# Patient Record
Sex: FEMALE | URBAN - METROPOLITAN AREA
[De-identification: names, ages, dates, MRNs, and addresses within clinical notes are randomized per-mention and may not be internally consistent; named-entity substitution may affect disease eponyms.]

---

## 2019-05-01 ENCOUNTER — HOSPITAL ENCOUNTER (OUTPATIENT)
Age: 64
Setting detail: SPECIMEN
Discharge: HOME | End: 2019-05-01
Payer: COMMERCIAL

## 2019-05-01 LAB
ANION GAP SERPL CALCULATED.3IONS-SCNC: 10 MMOL/L (ref 9–17)
BUN BLDV-MCNC: 9 MG/DL (ref 8–23)
BUN/CREAT BLD: ABNORMAL (ref 9–20)
CALCIUM SERPL-MCNC: 9.7 MG/DL (ref 8.6–10.4)
CHLORIDE BLD-SCNC: 91 MMOL/L (ref 98–107)
CO2: 28 MMOL/L (ref 20–31)
CREAT SERPL-MCNC: 0.58 MG/DL (ref 0.5–0.9)
GFR AFRICAN AMERICAN: >60 ML/MIN
GFR NON-AFRICAN AMERICAN: >60 ML/MIN
GFR SERPL CREATININE-BSD FRML MDRD: ABNORMAL ML/MIN/{1.73_M2}
GFR SERPL CREATININE-BSD FRML MDRD: ABNORMAL ML/MIN/{1.73_M2}
GLUCOSE BLD-MCNC: 134 MG/DL (ref 70–99)
HCT VFR BLD CALC: 33.1 % (ref 36.3–47.1)
HEMOGLOBIN: 10.1 G/DL (ref 11.9–15.1)
INR BLD: 2.3
MCH RBC QN AUTO: 27.7 PG (ref 25.2–33.5)
MCHC RBC AUTO-ENTMCNC: 30.5 G/DL (ref 28.4–34.8)
MCV RBC AUTO: 90.7 FL (ref 82.6–102.9)
NRBC AUTOMATED: 0 PER 100 WBC
PDW BLD-RTO: 17.3 % (ref 11.8–14.4)
PLATELET # BLD: 432 K/UL (ref 138–453)
PMV BLD AUTO: 10.2 FL (ref 8.1–13.5)
POTASSIUM SERPL-SCNC: 4.8 MMOL/L (ref 3.7–5.3)
PROTHROMBIN TIME: 22.5 SEC (ref 9.7–11.6)
RBC # BLD: 3.65 M/UL (ref 3.95–5.11)
SODIUM BLD-SCNC: 129 MMOL/L (ref 135–144)
WBC # BLD: 9.5 K/UL (ref 3.5–11.3)

## 2019-05-01 PROCEDURE — 80048 BASIC METABOLIC PNL TOTAL CA: CPT

## 2019-05-01 PROCEDURE — 36415 COLL VENOUS BLD VENIPUNCTURE: CPT

## 2019-05-01 PROCEDURE — 85027 COMPLETE CBC AUTOMATED: CPT

## 2019-05-01 PROCEDURE — 85610 PROTHROMBIN TIME: CPT

## 2019-05-01 PROCEDURE — P9603 ONE-WAY ALLOW PRORATED MILES: HCPCS

## 2019-05-04 ENCOUNTER — HOSPITAL ENCOUNTER (OUTPATIENT)
Age: 64
Setting detail: SPECIMEN
Discharge: HOME | End: 2019-05-04
Payer: COMMERCIAL

## 2019-05-04 LAB
ANION GAP SERPL CALCULATED.3IONS-SCNC: 11 MMOL/L (ref 9–17)
ANION GAP SERPL CALCULATED.3IONS-SCNC: 8 MMOL/L (ref 9–17)
BUN BLDV-MCNC: 23 MG/DL (ref 8–23)
BUN BLDV-MCNC: 24 MG/DL (ref 8–23)
BUN/CREAT BLD: 24 (ref 9–20)
BUN/CREAT BLD: 27 (ref 9–20)
CALCIUM SERPL-MCNC: 9.2 MG/DL (ref 8.6–10.4)
CALCIUM SERPL-MCNC: 9.3 MG/DL (ref 8.6–10.4)
CHLORIDE BLD-SCNC: 83 MMOL/L (ref 98–107)
CHLORIDE BLD-SCNC: 84 MMOL/L (ref 98–107)
CO2: 28 MMOL/L (ref 20–31)
CO2: 29 MMOL/L (ref 20–31)
CREAT SERPL-MCNC: 0.84 MG/DL (ref 0.5–0.9)
CREAT SERPL-MCNC: 1 MG/DL (ref 0.5–0.9)
GFR AFRICAN AMERICAN: >60 ML/MIN
GFR AFRICAN AMERICAN: >60 ML/MIN
GFR NON-AFRICAN AMERICAN: 55 ML/MIN
GFR NON-AFRICAN AMERICAN: >60 ML/MIN
GFR SERPL CREATININE-BSD FRML MDRD: ABNORMAL ML/MIN/{1.73_M2}
GLUCOSE BLD-MCNC: 174 MG/DL (ref 70–99)
GLUCOSE BLD-MCNC: 215 MG/DL (ref 70–99)
HCT VFR BLD CALC: 31.7 % (ref 36.3–47.1)
HEMOGLOBIN: 9.3 G/DL (ref 11.9–15.1)
INR BLD: 7.1
MCH RBC QN AUTO: 26.8 PG (ref 25.2–33.5)
MCHC RBC AUTO-ENTMCNC: 29.3 G/DL (ref 28.4–34.8)
MCV RBC AUTO: 91.4 FL (ref 82.6–102.9)
NRBC AUTOMATED: ABNORMAL PER 100 WBC
PDW BLD-RTO: 17.1 % (ref 11.8–14.4)
PLATELET # BLD: 382 K/UL (ref 138–453)
PMV BLD AUTO: 9.7 FL (ref 8.1–13.5)
POTASSIUM SERPL-SCNC: 4.9 MMOL/L (ref 3.7–5.3)
POTASSIUM SERPL-SCNC: 5.4 MMOL/L (ref 3.7–5.3)
PROTHROMBIN TIME: 67.9 SEC (ref 9.7–11.6)
RBC # BLD: 3.47 M/UL (ref 3.95–5.11)
SODIUM BLD-SCNC: 120 MMOL/L (ref 135–144)
SODIUM BLD-SCNC: 123 MMOL/L (ref 135–144)
WBC # BLD: 13.5 K/UL (ref 3.5–11.3)

## 2019-05-04 PROCEDURE — 80048 BASIC METABOLIC PNL TOTAL CA: CPT

## 2019-05-04 PROCEDURE — P9603 ONE-WAY ALLOW PRORATED MILES: HCPCS

## 2019-05-04 PROCEDURE — 85027 COMPLETE CBC AUTOMATED: CPT

## 2019-05-04 PROCEDURE — 85610 PROTHROMBIN TIME: CPT

## 2019-05-04 PROCEDURE — 36415 COLL VENOUS BLD VENIPUNCTURE: CPT

## 2019-05-05 ENCOUNTER — HOSPITAL ENCOUNTER (OUTPATIENT)
Age: 64
Setting detail: SPECIMEN
Discharge: HOME | End: 2019-05-05
Payer: COMMERCIAL

## 2019-05-05 LAB
INR BLD: 7.4
PROTHROMBIN TIME: 70.3 SEC (ref 9.7–11.6)

## 2019-05-05 PROCEDURE — 36415 COLL VENOUS BLD VENIPUNCTURE: CPT

## 2019-05-05 PROCEDURE — 85610 PROTHROMBIN TIME: CPT

## 2019-05-05 PROCEDURE — P9603 ONE-WAY ALLOW PRORATED MILES: HCPCS

## 2023-07-05 ENCOUNTER — HOSPITAL ENCOUNTER (INPATIENT)
Age: 68
LOS: 6 days | Discharge: HOME | DRG: 291 | End: 2023-07-11
Attending: EMERGENCY MEDICINE | Admitting: STUDENT IN AN ORGANIZED HEALTH CARE EDUCATION/TRAINING PROGRAM
Payer: MEDICARE

## 2023-07-05 ENCOUNTER — APPOINTMENT (OUTPATIENT)
Dept: GENERAL RADIOLOGY | Age: 68
DRG: 291 | End: 2023-07-05
Payer: MEDICARE

## 2023-07-05 DIAGNOSIS — N17.9 AKI (ACUTE KIDNEY INJURY) (HCC): ICD-10-CM

## 2023-07-05 DIAGNOSIS — R25.1 TREMOR: ICD-10-CM

## 2023-07-05 DIAGNOSIS — J96.01 ACUTE RESPIRATORY FAILURE WITH HYPOXIA AND HYPERCAPNIA (HCC): ICD-10-CM

## 2023-07-05 DIAGNOSIS — J96.02 ACUTE RESPIRATORY FAILURE WITH HYPOXIA AND HYPERCAPNIA (HCC): ICD-10-CM

## 2023-07-05 DIAGNOSIS — N18.9 CHRONIC KIDNEY DISEASE, UNSPECIFIED CKD STAGE: ICD-10-CM

## 2023-07-05 DIAGNOSIS — J81.0 ACUTE PULMONARY EDEMA (HCC): Primary | ICD-10-CM

## 2023-07-05 DIAGNOSIS — N17.0 ACUTE RENAL FAILURE WITH TUBULAR NECROSIS (HCC): ICD-10-CM

## 2023-07-05 PROBLEM — I50.9 DECOMPENSATED HEART FAILURE (HCC): Status: ACTIVE | Noted: 2023-07-05

## 2023-07-05 LAB
ACTION: NORMAL
ACTION: NORMAL
ANION GAP SERPL CALCULATED.3IONS-SCNC: 8 MMOL/L (ref 9–17)
BASOPHILS # BLD: 0 K/UL (ref 0–0.2)
BASOPHILS NFR BLD: 0 % (ref 0–2)
BNP SERPL-MCNC: 1805 PG/ML
BUN SERPL-MCNC: 28 MG/DL (ref 8–23)
CALCIUM SERPL-MCNC: 9.4 MG/DL (ref 8.6–10.4)
CHLORIDE SERPL-SCNC: 93 MMOL/L (ref 98–107)
CO2 SERPL-SCNC: 33 MMOL/L (ref 20–31)
CREAT SERPL-MCNC: 1.36 MG/DL (ref 0.5–0.9)
DATE AND TIME: NORMAL
DATE AND TIME: NORMAL
EOSINOPHIL # BLD: 0.1 K/UL (ref 0–0.4)
EOSINOPHILS RELATIVE PERCENT: 1 % (ref 1–4)
ERYTHROCYTE [DISTWIDTH] IN BLOOD BY AUTOMATED COUNT: 13.8 % (ref 12.5–15.4)
FIO2: 32
FIO2: 35
GFR SERPL CREATININE-BSD FRML MDRD: 42 ML/MIN/1.73M2
GLUCOSE BLD-MCNC: 104 MG/DL (ref 65–105)
GLUCOSE SERPL-MCNC: 117 MG/DL (ref 70–99)
HCO3 VENOUS: 34.2 MMOL/L (ref 22–29)
HCT VFR BLD AUTO: 28.7 % (ref 36–46)
HGB BLD-MCNC: 9.3 G/DL (ref 12–16)
INR PPP: 3.5
LYMPHOCYTES # BLD: 14 % (ref 24–44)
LYMPHOCYTES NFR BLD: 1 K/UL (ref 1–4.8)
MCH RBC QN AUTO: 32.6 PG (ref 26–34)
MCHC RBC AUTO-ENTMCNC: 32.3 G/DL (ref 31–37)
MCV RBC AUTO: 100.8 FL (ref 80–100)
MODE: ABNORMAL
MONOCYTES NFR BLD: 0.4 K/UL (ref 0.1–1.2)
MONOCYTES NFR BLD: 6 % (ref 2–11)
NEUTROPHILS NFR BLD: 79 % (ref 36–66)
NEUTS SEG NFR BLD: 5.6 K/UL (ref 1.8–7.7)
NOTIFY: NORMAL
NOTIFY: NORMAL
O2 DEVICE/FLOW/%: ABNORMAL
O2 SAT, VEN: 50 % (ref 60–85)
PCO2, VEN: 71.6 MM HG (ref 41–51)
PH VENOUS: 7.29 (ref 7.32–7.43)
PLATELET # BLD AUTO: 313 K/UL (ref 140–450)
PMV BLD AUTO: 7.5 FL (ref 6–12)
PO2, VEN: 31.2 MM HG (ref 30–50)
POC HCO3: 35.1 MMOL/L (ref 21–28)
POC O2 SATURATION: 90 % (ref 94–98)
POC PCO2: 76.4 MM HG (ref 35–48)
POC PH: 7.27 (ref 7.35–7.45)
POC PO2: 70.1 MM HG (ref 83–108)
POSITIVE BASE EXCESS, ART: 5 (ref 0–3)
POSITIVE BASE EXCESS, VEN: 6 (ref 0–3)
POTASSIUM SERPL-SCNC: 5.2 MMOL/L (ref 3.7–5.3)
PROTHROMBIN TIME: 35.5 SEC (ref 9.4–12.6)
RBC # BLD AUTO: 2.85 M/UL (ref 4–5.2)
READ BACK: YES
READ BACK: YES
SAMPLE SITE: ABNORMAL
SODIUM SERPL-SCNC: 134 MMOL/L (ref 135–144)
TROPONIN I SERPL HS-MCNC: 47 NG/L (ref 0–14)
TROPONIN I SERPL HS-MCNC: 50 NG/L (ref 0–14)
WBC OTHER # BLD: 7.1 K/UL (ref 3.5–11)

## 2023-07-05 PROCEDURE — 84484 ASSAY OF TROPONIN QUANT: CPT

## 2023-07-05 PROCEDURE — 6370000000 HC RX 637 (ALT 250 FOR IP): Performed by: NURSE PRACTITIONER

## 2023-07-05 PROCEDURE — 93005 ELECTROCARDIOGRAM TRACING: CPT | Performed by: NURSE PRACTITIONER

## 2023-07-05 PROCEDURE — 82803 BLOOD GASES ANY COMBINATION: CPT

## 2023-07-05 PROCEDURE — 85027 COMPLETE CBC AUTOMATED: CPT

## 2023-07-05 PROCEDURE — 82947 ASSAY GLUCOSE BLOOD QUANT: CPT

## 2023-07-05 PROCEDURE — 85610 PROTHROMBIN TIME: CPT

## 2023-07-05 PROCEDURE — 2060000000 HC ICU INTERMEDIATE R&B

## 2023-07-05 PROCEDURE — 96374 THER/PROPH/DIAG INJ IV PUSH: CPT

## 2023-07-05 PROCEDURE — 94640 AIRWAY INHALATION TREATMENT: CPT

## 2023-07-05 PROCEDURE — 6360000002 HC RX W HCPCS: Performed by: NURSE PRACTITIONER

## 2023-07-05 PROCEDURE — 36415 COLL VENOUS BLD VENIPUNCTURE: CPT

## 2023-07-05 PROCEDURE — 83036 HEMOGLOBIN GLYCOSYLATED A1C: CPT

## 2023-07-05 PROCEDURE — 80048 BASIC METABOLIC PNL TOTAL CA: CPT

## 2023-07-05 PROCEDURE — 99285 EMERGENCY DEPT VISIT HI MDM: CPT

## 2023-07-05 PROCEDURE — 71045 X-RAY EXAM CHEST 1 VIEW: CPT

## 2023-07-05 PROCEDURE — 6370000000 HC RX 637 (ALT 250 FOR IP): Performed by: FAMILY MEDICINE

## 2023-07-05 PROCEDURE — 83880 ASSAY OF NATRIURETIC PEPTIDE: CPT

## 2023-07-05 PROCEDURE — 2580000003 HC RX 258: Performed by: NURSE PRACTITIONER

## 2023-07-05 PROCEDURE — 94660 CPAP INITIATION&MGMT: CPT

## 2023-07-05 PROCEDURE — 36600 WITHDRAWAL OF ARTERIAL BLOOD: CPT

## 2023-07-05 RX ORDER — SODIUM CHLORIDE 0.9 % (FLUSH) 0.9 %
10 SYRINGE (ML) INJECTION PRN
Status: DISCONTINUED | OUTPATIENT
Start: 2023-07-05 | End: 2023-07-11 | Stop reason: HOSPADM

## 2023-07-05 RX ORDER — DEXTROSE MONOHYDRATE 100 MG/ML
INJECTION, SOLUTION INTRAVENOUS CONTINUOUS PRN
Status: DISCONTINUED | OUTPATIENT
Start: 2023-07-05 | End: 2023-07-11 | Stop reason: HOSPADM

## 2023-07-05 RX ORDER — POTASSIUM CHLORIDE 750 MG/1
10 CAPSULE, EXTENDED RELEASE ORAL DAILY
Status: DISCONTINUED | OUTPATIENT
Start: 2023-07-06 | End: 2023-07-11 | Stop reason: HOSPADM

## 2023-07-05 RX ORDER — ASPIRIN 81 MG/1
81 TABLET, CHEWABLE ORAL DAILY
Status: DISCONTINUED | OUTPATIENT
Start: 2023-07-06 | End: 2023-07-11 | Stop reason: HOSPADM

## 2023-07-05 RX ORDER — SODIUM CHLORIDE 9 MG/ML
INJECTION, SOLUTION INTRAVENOUS PRN
Status: DISCONTINUED | OUTPATIENT
Start: 2023-07-05 | End: 2023-07-11 | Stop reason: HOSPADM

## 2023-07-05 RX ORDER — ATORVASTATIN CALCIUM 40 MG/1
40 TABLET, FILM COATED ORAL DAILY
Status: DISCONTINUED | OUTPATIENT
Start: 2023-07-06 | End: 2023-07-11 | Stop reason: HOSPADM

## 2023-07-05 RX ORDER — SENNA AND DOCUSATE SODIUM 50; 8.6 MG/1; MG/1
1 TABLET, FILM COATED ORAL DAILY
Status: DISCONTINUED | OUTPATIENT
Start: 2023-07-06 | End: 2023-07-11 | Stop reason: HOSPADM

## 2023-07-05 RX ORDER — ENOXAPARIN SODIUM 100 MG/ML
40 INJECTION SUBCUTANEOUS DAILY
Status: DISCONTINUED | OUTPATIENT
Start: 2023-07-06 | End: 2023-07-05

## 2023-07-05 RX ORDER — GLUCAGON 1 MG/ML
1 KIT INJECTION PRN
Status: DISCONTINUED | OUTPATIENT
Start: 2023-07-05 | End: 2023-07-11 | Stop reason: HOSPADM

## 2023-07-05 RX ORDER — VITAMIN B COMPLEX
1000 TABLET ORAL DAILY
Status: DISCONTINUED | OUTPATIENT
Start: 2023-07-06 | End: 2023-07-11 | Stop reason: HOSPADM

## 2023-07-05 RX ORDER — INSULIN LISPRO 100 [IU]/ML
0-4 INJECTION, SOLUTION INTRAVENOUS; SUBCUTANEOUS NIGHTLY
Status: DISCONTINUED | OUTPATIENT
Start: 2023-07-05 | End: 2023-07-11 | Stop reason: HOSPADM

## 2023-07-05 RX ORDER — POTASSIUM CHLORIDE 7.45 MG/ML
10 INJECTION INTRAVENOUS PRN
Status: DISCONTINUED | OUTPATIENT
Start: 2023-07-05 | End: 2023-07-11 | Stop reason: HOSPADM

## 2023-07-05 RX ORDER — ONDANSETRON 4 MG/1
4 TABLET, ORALLY DISINTEGRATING ORAL EVERY 8 HOURS PRN
Status: DISCONTINUED | OUTPATIENT
Start: 2023-07-05 | End: 2023-07-11 | Stop reason: HOSPADM

## 2023-07-05 RX ORDER — FUROSEMIDE 10 MG/ML
60 INJECTION INTRAMUSCULAR; INTRAVENOUS ONCE
Status: COMPLETED | OUTPATIENT
Start: 2023-07-05 | End: 2023-07-05

## 2023-07-05 RX ORDER — ONDANSETRON 2 MG/ML
4 INJECTION INTRAMUSCULAR; INTRAVENOUS EVERY 6 HOURS PRN
Status: DISCONTINUED | OUTPATIENT
Start: 2023-07-05 | End: 2023-07-11 | Stop reason: HOSPADM

## 2023-07-05 RX ORDER — GABAPENTIN 300 MG/1
300 CAPSULE ORAL 3 TIMES DAILY
Status: DISCONTINUED | OUTPATIENT
Start: 2023-07-05 | End: 2023-07-11 | Stop reason: HOSPADM

## 2023-07-05 RX ORDER — ACETAMINOPHEN 325 MG/1
650 TABLET ORAL EVERY 6 HOURS PRN
Status: DISCONTINUED | OUTPATIENT
Start: 2023-07-05 | End: 2023-07-11 | Stop reason: HOSPADM

## 2023-07-05 RX ORDER — IPRATROPIUM BROMIDE AND ALBUTEROL SULFATE 2.5; .5 MG/3ML; MG/3ML
1 SOLUTION RESPIRATORY (INHALATION) 2 TIMES DAILY
Status: DISCONTINUED | OUTPATIENT
Start: 2023-07-05 | End: 2023-07-11 | Stop reason: HOSPADM

## 2023-07-05 RX ORDER — POLYETHYLENE GLYCOL 3350 17 G/17G
17 POWDER, FOR SOLUTION ORAL DAILY PRN
Status: DISCONTINUED | OUTPATIENT
Start: 2023-07-05 | End: 2023-07-11 | Stop reason: HOSPADM

## 2023-07-05 RX ORDER — FUROSEMIDE 10 MG/ML
40 INJECTION INTRAMUSCULAR; INTRAVENOUS 2 TIMES DAILY
Status: DISCONTINUED | OUTPATIENT
Start: 2023-07-05 | End: 2023-07-08

## 2023-07-05 RX ORDER — SODIUM CHLORIDE 1 G/1
2 TABLET ORAL 2 TIMES DAILY
Status: DISCONTINUED | OUTPATIENT
Start: 2023-07-05 | End: 2023-07-07

## 2023-07-05 RX ORDER — ALBUTEROL SULFATE 2.5 MG/3ML
2.5 SOLUTION RESPIRATORY (INHALATION) EVERY 4 HOURS PRN
Status: DISCONTINUED | OUTPATIENT
Start: 2023-07-05 | End: 2023-07-11 | Stop reason: HOSPADM

## 2023-07-05 RX ORDER — POTASSIUM CHLORIDE 20 MEQ/1
40 TABLET, EXTENDED RELEASE ORAL PRN
Status: DISCONTINUED | OUTPATIENT
Start: 2023-07-05 | End: 2023-07-11 | Stop reason: HOSPADM

## 2023-07-05 RX ORDER — BISACODYL 10 MG
10 SUPPOSITORY, RECTAL RECTAL DAILY
Status: DISCONTINUED | OUTPATIENT
Start: 2023-07-06 | End: 2023-07-11 | Stop reason: HOSPADM

## 2023-07-05 RX ORDER — INSULIN LISPRO 100 [IU]/ML
0-4 INJECTION, SOLUTION INTRAVENOUS; SUBCUTANEOUS
Status: DISCONTINUED | OUTPATIENT
Start: 2023-07-06 | End: 2023-07-11 | Stop reason: HOSPADM

## 2023-07-05 RX ORDER — MAGNESIUM SULFATE IN WATER 40 MG/ML
2000 INJECTION, SOLUTION INTRAVENOUS PRN
Status: DISCONTINUED | OUTPATIENT
Start: 2023-07-05 | End: 2023-07-11 | Stop reason: HOSPADM

## 2023-07-05 RX ORDER — SODIUM CHLORIDE 0.9 % (FLUSH) 0.9 %
5-40 SYRINGE (ML) INJECTION EVERY 12 HOURS SCHEDULED
Status: DISCONTINUED | OUTPATIENT
Start: 2023-07-05 | End: 2023-07-11 | Stop reason: HOSPADM

## 2023-07-05 RX ORDER — METOPROLOL SUCCINATE 25 MG/1
25 TABLET, EXTENDED RELEASE ORAL DAILY
Status: DISCONTINUED | OUTPATIENT
Start: 2023-07-06 | End: 2023-07-11 | Stop reason: HOSPADM

## 2023-07-05 RX ORDER — ACETAMINOPHEN 650 MG/1
650 SUPPOSITORY RECTAL EVERY 6 HOURS PRN
Status: DISCONTINUED | OUTPATIENT
Start: 2023-07-05 | End: 2023-07-11 | Stop reason: HOSPADM

## 2023-07-05 RX ADMIN — IPRATROPIUM BROMIDE AND ALBUTEROL SULFATE 1 DOSE: .5; 2.5 SOLUTION RESPIRATORY (INHALATION) at 22:58

## 2023-07-05 RX ADMIN — SODIUM CHLORIDE, PRESERVATIVE FREE 10 ML: 5 INJECTION INTRAVENOUS at 23:06

## 2023-07-05 RX ADMIN — SODIUM CHLORIDE 2 G: 1 TABLET ORAL at 23:06

## 2023-07-05 RX ADMIN — FUROSEMIDE 60 MG: 10 INJECTION, SOLUTION INTRAMUSCULAR; INTRAVENOUS at 19:49

## 2023-07-05 RX ADMIN — GABAPENTIN 300 MG: 300 CAPSULE ORAL at 23:06

## 2023-07-05 RX ADMIN — FUROSEMIDE 40 MG: 10 INJECTION, SOLUTION INTRAMUSCULAR; INTRAVENOUS at 23:06

## 2023-07-05 ASSESSMENT — PAIN - FUNCTIONAL ASSESSMENT: PAIN_FUNCTIONAL_ASSESSMENT: NONE - DENIES PAIN

## 2023-07-05 ASSESSMENT — LIFESTYLE VARIABLES
HOW OFTEN DO YOU HAVE A DRINK CONTAINING ALCOHOL: NEVER
HOW MANY STANDARD DRINKS CONTAINING ALCOHOL DO YOU HAVE ON A TYPICAL DAY: PATIENT DOES NOT DRINK

## 2023-07-06 ENCOUNTER — APPOINTMENT (OUTPATIENT)
Dept: NON INVASIVE DIAGNOSTICS | Age: 68
DRG: 291 | End: 2023-07-06
Payer: MEDICARE

## 2023-07-06 PROBLEM — J81.0 ACUTE PULMONARY EDEMA (HCC): Status: ACTIVE | Noted: 2023-07-06

## 2023-07-06 LAB
ALLEN TEST: POSITIVE
ANION GAP SERPL CALCULATED.3IONS-SCNC: 12 MMOL/L (ref 9–17)
BACTERIA URNS QL MICRO: ABNORMAL
BILIRUB UR QL STRIP: NEGATIVE
BNP SERPL-MCNC: 1713 PG/ML
BUN SERPL-MCNC: 28 MG/DL (ref 8–23)
CALCIUM SERPL-MCNC: 9.8 MG/DL (ref 8.6–10.4)
CHARACTER UR: ABNORMAL
CHLORIDE SERPL-SCNC: 92 MMOL/L (ref 98–107)
CHOLEST SERPL-MCNC: 131 MG/DL
CHOLESTEROL/HDL RATIO: 3.3
CLARITY UR: ABNORMAL
CO2 SERPL-SCNC: 32 MMOL/L (ref 20–31)
COLOR UR: YELLOW
CREAT SERPL-MCNC: 1.34 MG/DL (ref 0.5–0.9)
EPI CELLS #/AREA URNS HPF: ABNORMAL /HPF (ref 0–5)
EST. AVERAGE GLUCOSE BLD GHB EST-MCNC: 131 MG/DL
FIO2: 35
GFR SERPL CREATININE-BSD FRML MDRD: 43 ML/MIN/1.73M2
GLUCOSE BLD-MCNC: 156 MG/DL (ref 65–105)
GLUCOSE BLD-MCNC: 160 MG/DL (ref 65–105)
GLUCOSE BLD-MCNC: 172 MG/DL (ref 65–105)
GLUCOSE BLD-MCNC: 188 MG/DL (ref 65–105)
GLUCOSE SERPL-MCNC: 100 MG/DL (ref 70–99)
GLUCOSE UR STRIP-MCNC: NEGATIVE MG/DL
HBA1C MFR BLD: 6.2 % (ref 4–6)
HDLC SERPL-MCNC: 40 MG/DL
HGB UR QL STRIP.AUTO: ABNORMAL
IRON SATN MFR SERPL: 23 % (ref 20–55)
IRON SERPL-MCNC: 43 UG/DL (ref 37–145)
KETONES UR STRIP-MCNC: NEGATIVE MG/DL
LDLC SERPL CALC-MCNC: 70 MG/DL (ref 0–130)
LEUKOCYTE ESTERASE UR QL STRIP: ABNORMAL
LV EF: 55 %
LVEF MODALITY: NORMAL
MAGNESIUM SERPL-MCNC: 2.5 MG/DL (ref 1.6–2.6)
MODE: ABNORMAL
MYOGLOBIN SERPL-MCNC: 90 NG/ML (ref 25–58)
MYOGLOBIN SERPL-MCNC: 93 NG/ML (ref 25–58)
NITRITE UR QL STRIP: NEGATIVE
O2 DEVICE/FLOW/%: ABNORMAL
PH UR STRIP: 6 [PH] (ref 5–8)
POC HCO3: 35.2 MMOL/L (ref 21–28)
POC O2 SATURATION: 98 % (ref 94–98)
POC PCO2: 61.4 MM HG (ref 35–48)
POC PH: 7.37 (ref 7.35–7.45)
POC PO2: 116.4 MM HG (ref 83–108)
POSITIVE BASE EXCESS, ART: 8 (ref 0–3)
POTASSIUM SERPL-SCNC: 5.1 MMOL/L (ref 3.7–5.3)
PROT UR STRIP-MCNC: NEGATIVE MG/DL
RBC #/AREA URNS HPF: ABNORMAL /HPF (ref 0–2)
SAMPLE SITE: ABNORMAL
SODIUM SERPL-SCNC: 136 MMOL/L (ref 135–144)
SP GR UR STRIP: 1.01 (ref 1–1.03)
TIBC SERPL-MCNC: 185 UG/DL (ref 250–450)
TRIGL SERPL-MCNC: 104 MG/DL
TROPONIN I SERPL HS-MCNC: 57 NG/L (ref 0–14)
TROPONIN I SERPL HS-MCNC: 66 NG/L (ref 0–14)
TSH SERPL DL<=0.05 MIU/L-ACNC: 0.91 UIU/ML (ref 0.3–5)
UNSATURATED IRON BINDING CAPACITY: 142 UG/DL (ref 112–347)
UROBILINOGEN UR STRIP-ACNC: NORMAL
WBC #/AREA URNS HPF: ABNORMAL /HPF (ref 0–5)
YEAST URNS QL MICRO: ABNORMAL

## 2023-07-06 PROCEDURE — 80048 BASIC METABOLIC PNL TOTAL CA: CPT

## 2023-07-06 PROCEDURE — 83550 IRON BINDING TEST: CPT

## 2023-07-06 PROCEDURE — 6360000002 HC RX W HCPCS: Performed by: NURSE PRACTITIONER

## 2023-07-06 PROCEDURE — 93321 DOPPLER ECHO F-UP/LMTD STD: CPT

## 2023-07-06 PROCEDURE — 2700000000 HC OXYGEN THERAPY PER DAY

## 2023-07-06 PROCEDURE — 2580000003 HC RX 258: Performed by: NURSE PRACTITIONER

## 2023-07-06 PROCEDURE — 84443 ASSAY THYROID STIM HORMONE: CPT

## 2023-07-06 PROCEDURE — 36415 COLL VENOUS BLD VENIPUNCTURE: CPT

## 2023-07-06 PROCEDURE — 6360000002 HC RX W HCPCS: Performed by: HOSPITALIST

## 2023-07-06 PROCEDURE — 87086 URINE CULTURE/COLONY COUNT: CPT

## 2023-07-06 PROCEDURE — 94761 N-INVAS EAR/PLS OXIMETRY MLT: CPT

## 2023-07-06 PROCEDURE — 82803 BLOOD GASES ANY COMBINATION: CPT

## 2023-07-06 PROCEDURE — 97166 OT EVAL MOD COMPLEX 45 MIN: CPT

## 2023-07-06 PROCEDURE — 83540 ASSAY OF IRON: CPT

## 2023-07-06 PROCEDURE — 2060000000 HC ICU INTERMEDIATE R&B

## 2023-07-06 PROCEDURE — 83880 ASSAY OF NATRIURETIC PEPTIDE: CPT

## 2023-07-06 PROCEDURE — 97116 GAIT TRAINING THERAPY: CPT

## 2023-07-06 PROCEDURE — 6370000000 HC RX 637 (ALT 250 FOR IP): Performed by: FAMILY MEDICINE

## 2023-07-06 PROCEDURE — 97535 SELF CARE MNGMENT TRAINING: CPT

## 2023-07-06 PROCEDURE — 6370000000 HC RX 637 (ALT 250 FOR IP): Performed by: NURSE PRACTITIONER

## 2023-07-06 PROCEDURE — 84484 ASSAY OF TROPONIN QUANT: CPT

## 2023-07-06 PROCEDURE — 99222 1ST HOSP IP/OBS MODERATE 55: CPT | Performed by: HOSPITALIST

## 2023-07-06 PROCEDURE — 97530 THERAPEUTIC ACTIVITIES: CPT

## 2023-07-06 PROCEDURE — 80061 LIPID PANEL: CPT

## 2023-07-06 PROCEDURE — 99223 1ST HOSP IP/OBS HIGH 75: CPT | Performed by: NURSE PRACTITIONER

## 2023-07-06 PROCEDURE — 81001 URINALYSIS AUTO W/SCOPE: CPT

## 2023-07-06 PROCEDURE — 36600 WITHDRAWAL OF ARTERIAL BLOOD: CPT

## 2023-07-06 PROCEDURE — 94640 AIRWAY INHALATION TREATMENT: CPT

## 2023-07-06 PROCEDURE — 94669 MECHANICAL CHEST WALL OSCILL: CPT

## 2023-07-06 PROCEDURE — 83735 ASSAY OF MAGNESIUM: CPT

## 2023-07-06 PROCEDURE — 82947 ASSAY GLUCOSE BLOOD QUANT: CPT

## 2023-07-06 PROCEDURE — 97162 PT EVAL MOD COMPLEX 30 MIN: CPT

## 2023-07-06 PROCEDURE — 93308 TTE F-UP OR LMTD: CPT

## 2023-07-06 PROCEDURE — 83874 ASSAY OF MYOGLOBIN: CPT

## 2023-07-06 PROCEDURE — 2580000003 HC RX 258: Performed by: HOSPITALIST

## 2023-07-06 RX ORDER — HYDROCODONE BITARTRATE AND ACETAMINOPHEN 5; 325 MG/1; MG/1
1 TABLET ORAL EVERY 6 HOURS PRN
Status: DISCONTINUED | OUTPATIENT
Start: 2023-07-06 | End: 2023-07-11 | Stop reason: HOSPADM

## 2023-07-06 RX ADMIN — GABAPENTIN 300 MG: 300 CAPSULE ORAL at 22:16

## 2023-07-06 RX ADMIN — IPRATROPIUM BROMIDE AND ALBUTEROL SULFATE 1 DOSE: .5; 2.5 SOLUTION RESPIRATORY (INHALATION) at 09:21

## 2023-07-06 RX ADMIN — HYDROCODONE BITARTRATE AND ACETAMINOPHEN 1 TABLET: 5; 325 TABLET ORAL at 23:00

## 2023-07-06 RX ADMIN — GABAPENTIN 300 MG: 300 CAPSULE ORAL at 09:25

## 2023-07-06 RX ADMIN — SODIUM CHLORIDE 2 G: 1 TABLET ORAL at 09:25

## 2023-07-06 RX ADMIN — POTASSIUM CHLORIDE 10 MEQ: 750 CAPSULE, EXTENDED RELEASE ORAL at 09:25

## 2023-07-06 RX ADMIN — CEFTRIAXONE SODIUM 1000 MG: 1 INJECTION, POWDER, FOR SOLUTION INTRAMUSCULAR; INTRAVENOUS at 10:25

## 2023-07-06 RX ADMIN — FUROSEMIDE 40 MG: 10 INJECTION, SOLUTION INTRAMUSCULAR; INTRAVENOUS at 17:47

## 2023-07-06 RX ADMIN — Medication 1000 UNITS: at 09:26

## 2023-07-06 RX ADMIN — ATORVASTATIN CALCIUM 40 MG: 40 TABLET, FILM COATED ORAL at 09:25

## 2023-07-06 RX ADMIN — SODIUM CHLORIDE, PRESERVATIVE FREE 10 ML: 5 INJECTION INTRAVENOUS at 09:27

## 2023-07-06 RX ADMIN — GABAPENTIN 300 MG: 300 CAPSULE ORAL at 13:44

## 2023-07-06 RX ADMIN — SODIUM CHLORIDE, PRESERVATIVE FREE 10 ML: 5 INJECTION INTRAVENOUS at 22:17

## 2023-07-06 RX ADMIN — FUROSEMIDE 40 MG: 10 INJECTION, SOLUTION INTRAMUSCULAR; INTRAVENOUS at 09:27

## 2023-07-06 RX ADMIN — ONDANSETRON 4 MG: 2 INJECTION INTRAMUSCULAR; INTRAVENOUS at 23:00

## 2023-07-06 RX ADMIN — METOPROLOL SUCCINATE 25 MG: 25 TABLET, EXTENDED RELEASE ORAL at 09:25

## 2023-07-06 RX ADMIN — SODIUM CHLORIDE 2 G: 1 TABLET ORAL at 22:16

## 2023-07-06 RX ADMIN — IPRATROPIUM BROMIDE AND ALBUTEROL SULFATE 1 DOSE: .5; 2.5 SOLUTION RESPIRATORY (INHALATION) at 19:55

## 2023-07-06 RX ADMIN — ASPIRIN 81 MG CHEWABLE TABLET 81 MG: 81 TABLET CHEWABLE at 09:26

## 2023-07-06 ASSESSMENT — PAIN DESCRIPTION - LOCATION: LOCATION: BACK

## 2023-07-06 ASSESSMENT — PAIN - FUNCTIONAL ASSESSMENT: PAIN_FUNCTIONAL_ASSESSMENT: ACTIVITIES ARE NOT PREVENTED

## 2023-07-06 ASSESSMENT — PAIN DESCRIPTION - DESCRIPTORS: DESCRIPTORS: ACHING

## 2023-07-06 ASSESSMENT — PAIN SCALES - GENERAL: PAINLEVEL_OUTOF10: 7

## 2023-07-07 ENCOUNTER — APPOINTMENT (OUTPATIENT)
Dept: ULTRASOUND IMAGING | Age: 68
DRG: 291 | End: 2023-07-07
Payer: MEDICARE

## 2023-07-07 ENCOUNTER — APPOINTMENT (OUTPATIENT)
Dept: CT IMAGING | Age: 68
DRG: 291 | End: 2023-07-07
Payer: MEDICARE

## 2023-07-07 PROBLEM — R33.9 URINARY RETENTION: Status: ACTIVE | Noted: 2023-07-07

## 2023-07-07 PROBLEM — R33.8 OTHER RETENTION OF URINE: Status: ACTIVE | Noted: 2023-07-07

## 2023-07-07 PROBLEM — N13.30 HYDRONEPHROSIS: Status: ACTIVE | Noted: 2023-07-07

## 2023-07-07 PROBLEM — J96.02 ACUTE RESPIRATORY FAILURE WITH HYPOXIA AND HYPERCAPNIA (HCC): Status: ACTIVE | Noted: 2023-07-07

## 2023-07-07 PROBLEM — I95.9 ARTERIAL HYPOTENSION: Status: ACTIVE | Noted: 2023-07-07

## 2023-07-07 PROBLEM — R09.89 PULMONARY VASCULAR CONGESTION: Status: ACTIVE | Noted: 2023-07-07

## 2023-07-07 PROBLEM — J96.01 ACUTE RESPIRATORY FAILURE WITH HYPOXIA AND HYPERCAPNIA (HCC): Status: ACTIVE | Noted: 2023-07-07

## 2023-07-07 PROBLEM — N17.9 AKI (ACUTE KIDNEY INJURY) (HCC): Status: ACTIVE | Noted: 2023-07-07

## 2023-07-07 LAB
ACTION: NORMAL
ALLEN TEST: POSITIVE
ANION GAP SERPL CALCULATED.3IONS-SCNC: 9 MMOL/L (ref 9–17)
ANION GAP SERPL CALCULATED.3IONS-SCNC: 9 MMOL/L (ref 9–17)
BASOPHILS # BLD: 0 K/UL (ref 0–0.2)
BASOPHILS NFR BLD: 1 % (ref 0–2)
BUN SERPL-MCNC: 39 MG/DL (ref 8–23)
BUN SERPL-MCNC: 40 MG/DL (ref 8–23)
CALCIUM SERPL-MCNC: 9.2 MG/DL (ref 8.6–10.4)
CALCIUM SERPL-MCNC: 9.2 MG/DL (ref 8.6–10.4)
CHLORIDE SERPL-SCNC: 91 MMOL/L (ref 98–107)
CHLORIDE SERPL-SCNC: 91 MMOL/L (ref 98–107)
CHLORIDE UR-SCNC: 50 MMOL/L
CO2 SERPL-SCNC: 35 MMOL/L (ref 20–31)
CO2 SERPL-SCNC: 36 MMOL/L (ref 20–31)
CREAT SERPL-MCNC: 1.69 MG/DL (ref 0.5–0.9)
CREAT SERPL-MCNC: 1.75 MG/DL (ref 0.5–0.9)
DATE AND TIME: NORMAL
EKG ATRIAL RATE: 77 BPM
EKG P AXIS: 61 DEGREES
EKG P-R INTERVAL: 178 MS
EKG Q-T INTERVAL: 376 MS
EKG QRS DURATION: 88 MS
EKG QTC CALCULATION (BAZETT): 425 MS
EKG R AXIS: 46 DEGREES
EKG T AXIS: 59 DEGREES
EKG VENTRICULAR RATE: 77 BPM
EOSINOPHIL # BLD: 0.2 K/UL (ref 0–0.4)
EOSINOPHILS RELATIVE PERCENT: 2 % (ref 1–4)
ERYTHROCYTE [DISTWIDTH] IN BLOOD BY AUTOMATED COUNT: 14 % (ref 12.5–15.4)
ERYTHROCYTE [DISTWIDTH] IN BLOOD BY AUTOMATED COUNT: 14.1 % (ref 12.5–15.4)
FIO2: 28
GFR SERPL CREATININE-BSD FRML MDRD: 31 ML/MIN/1.73M2
GFR SERPL CREATININE-BSD FRML MDRD: 32 ML/MIN/1.73M2
GLUCOSE BLD-MCNC: 144 MG/DL (ref 65–105)
GLUCOSE BLD-MCNC: 146 MG/DL (ref 65–105)
GLUCOSE BLD-MCNC: 149 MG/DL (ref 65–105)
GLUCOSE BLD-MCNC: 245 MG/DL (ref 65–105)
GLUCOSE SERPL-MCNC: 142 MG/DL (ref 70–99)
GLUCOSE SERPL-MCNC: 169 MG/DL (ref 70–99)
HCT VFR BLD AUTO: 28.1 % (ref 36–46)
HCT VFR BLD AUTO: 28.6 % (ref 36–46)
HGB BLD-MCNC: 9.3 G/DL (ref 12–16)
HGB BLD-MCNC: 9.5 G/DL (ref 12–16)
LACTATE BLDV-SCNC: 1.5 MMOL/L (ref 0.5–2.2)
LACTATE BLDV-SCNC: 1.9 MMOL/L (ref 0.5–2.2)
LYMPHOCYTES # BLD: 15 % (ref 24–44)
LYMPHOCYTES NFR BLD: 1.1 K/UL (ref 1–4.8)
MAGNESIUM SERPL-MCNC: 2.6 MG/DL (ref 1.6–2.6)
MCH RBC QN AUTO: 32.9 PG (ref 26–34)
MCH RBC QN AUTO: 33 PG (ref 26–34)
MCHC RBC AUTO-ENTMCNC: 33.1 G/DL (ref 31–37)
MCHC RBC AUTO-ENTMCNC: 33.1 G/DL (ref 31–37)
MCV RBC AUTO: 99.3 FL (ref 80–100)
MCV RBC AUTO: 99.7 FL (ref 80–100)
MICROORGANISM SPEC CULT: NORMAL
MONOCYTES NFR BLD: 0.4 K/UL (ref 0.1–1.2)
MONOCYTES NFR BLD: 5 % (ref 2–11)
NEUTROPHILS NFR BLD: 77 % (ref 36–66)
NEUTS SEG NFR BLD: 5.9 K/UL (ref 1.8–7.7)
NOTIFY: NORMAL
O2 DEVICE/FLOW/%: ABNORMAL
PLATELET # BLD AUTO: 342 K/UL (ref 140–450)
PLATELET # BLD AUTO: 352 K/UL (ref 140–450)
PMV BLD AUTO: 7.2 FL (ref 6–12)
PMV BLD AUTO: 7.5 FL (ref 6–12)
POC HCO3: 40.3 MMOL/L (ref 21–28)
POC O2 SATURATION: 96 % (ref 94–98)
POC PCO2: 73.1 MM HG (ref 35–48)
POC PH: 7.35 (ref 7.35–7.45)
POC PO2: 89.7 MM HG (ref 83–108)
POSITIVE BASE EXCESS, ART: 12 (ref 0–3)
POTASSIUM SERPL-SCNC: 4.2 MMOL/L (ref 3.7–5.3)
POTASSIUM SERPL-SCNC: 4.5 MMOL/L (ref 3.7–5.3)
RBC # BLD AUTO: 2.82 M/UL (ref 4–5.2)
RBC # BLD AUTO: 2.88 M/UL (ref 4–5.2)
READ BACK: YES
SAMPLE SITE: ABNORMAL
SODIUM SERPL-SCNC: 135 MMOL/L (ref 135–144)
SODIUM SERPL-SCNC: 136 MMOL/L (ref 135–144)
SODIUM UR-SCNC: 60 MMOL/L
SPECIMEN DESCRIPTION: NORMAL
WBC OTHER # BLD: 7.5 K/UL (ref 3.5–11)
WBC OTHER # BLD: 7.6 K/UL (ref 3.5–11)

## 2023-07-07 PROCEDURE — 83521 IG LIGHT CHAINS FREE EACH: CPT

## 2023-07-07 PROCEDURE — 36600 WITHDRAWAL OF ARTERIAL BLOOD: CPT

## 2023-07-07 PROCEDURE — 99232 SBSQ HOSP IP/OBS MODERATE 35: CPT | Performed by: HOSPITALIST

## 2023-07-07 PROCEDURE — 86160 COMPLEMENT ANTIGEN: CPT

## 2023-07-07 PROCEDURE — 76770 US EXAM ABDO BACK WALL COMP: CPT

## 2023-07-07 PROCEDURE — 86334 IMMUNOFIX E-PHORESIS SERUM: CPT

## 2023-07-07 PROCEDURE — 84145 PROCALCITONIN (PCT): CPT

## 2023-07-07 PROCEDURE — 70450 CT HEAD/BRAIN W/O DYE: CPT

## 2023-07-07 PROCEDURE — 99223 1ST HOSP IP/OBS HIGH 75: CPT | Performed by: SPECIALIST

## 2023-07-07 PROCEDURE — 2000000000 HC ICU R&B

## 2023-07-07 PROCEDURE — 80074 ACUTE HEPATITIS PANEL: CPT

## 2023-07-07 PROCEDURE — 2580000003 HC RX 258: Performed by: NURSE PRACTITIONER

## 2023-07-07 PROCEDURE — 86038 ANTINUCLEAR ANTIBODIES: CPT

## 2023-07-07 PROCEDURE — 85027 COMPLETE CBC AUTOMATED: CPT

## 2023-07-07 PROCEDURE — 6370000000 HC RX 637 (ALT 250 FOR IP): Performed by: INTERNAL MEDICINE

## 2023-07-07 PROCEDURE — 84155 ASSAY OF PROTEIN SERUM: CPT

## 2023-07-07 PROCEDURE — 71250 CT THORAX DX C-: CPT

## 2023-07-07 PROCEDURE — 82570 ASSAY OF URINE CREATININE: CPT

## 2023-07-07 PROCEDURE — 6370000000 HC RX 637 (ALT 250 FOR IP): Performed by: NURSE PRACTITIONER

## 2023-07-07 PROCEDURE — 83605 ASSAY OF LACTIC ACID: CPT

## 2023-07-07 PROCEDURE — 82947 ASSAY GLUCOSE BLOOD QUANT: CPT

## 2023-07-07 PROCEDURE — 82436 ASSAY OF URINE CHLORIDE: CPT

## 2023-07-07 PROCEDURE — 99291 CRITICAL CARE FIRST HOUR: CPT | Performed by: NURSE PRACTITIONER

## 2023-07-07 PROCEDURE — 84300 ASSAY OF URINE SODIUM: CPT

## 2023-07-07 PROCEDURE — 84156 ASSAY OF PROTEIN URINE: CPT

## 2023-07-07 PROCEDURE — 2700000000 HC OXYGEN THERAPY PER DAY

## 2023-07-07 PROCEDURE — 87040 BLOOD CULTURE FOR BACTERIA: CPT

## 2023-07-07 PROCEDURE — 51798 US URINE CAPACITY MEASURE: CPT

## 2023-07-07 PROCEDURE — 80048 BASIC METABOLIC PNL TOTAL CA: CPT

## 2023-07-07 PROCEDURE — 82803 BLOOD GASES ANY COMBINATION: CPT

## 2023-07-07 PROCEDURE — 99233 SBSQ HOSP IP/OBS HIGH 50: CPT | Performed by: NURSE PRACTITIONER

## 2023-07-07 PROCEDURE — 84165 PROTEIN E-PHORESIS SERUM: CPT

## 2023-07-07 PROCEDURE — 86335 IMMUNFIX E-PHORSIS/URINE/CSF: CPT

## 2023-07-07 PROCEDURE — 6360000002 HC RX W HCPCS: Performed by: HOSPITALIST

## 2023-07-07 PROCEDURE — 94669 MECHANICAL CHEST WALL OSCILL: CPT

## 2023-07-07 PROCEDURE — 2580000003 HC RX 258: Performed by: HOSPITALIST

## 2023-07-07 PROCEDURE — 83735 ASSAY OF MAGNESIUM: CPT

## 2023-07-07 PROCEDURE — 94761 N-INVAS EAR/PLS OXIMETRY MLT: CPT

## 2023-07-07 PROCEDURE — 86225 DNA ANTIBODY NATIVE: CPT

## 2023-07-07 PROCEDURE — 99223 1ST HOSP IP/OBS HIGH 75: CPT | Performed by: INTERNAL MEDICINE

## 2023-07-07 PROCEDURE — 84166 PROTEIN E-PHORESIS/URINE/CSF: CPT

## 2023-07-07 PROCEDURE — 6370000000 HC RX 637 (ALT 250 FOR IP): Performed by: FAMILY MEDICINE

## 2023-07-07 PROCEDURE — 6360000002 HC RX W HCPCS: Performed by: NURSE PRACTITIONER

## 2023-07-07 PROCEDURE — 36415 COLL VENOUS BLD VENIPUNCTURE: CPT

## 2023-07-07 PROCEDURE — 94640 AIRWAY INHALATION TREATMENT: CPT

## 2023-07-07 RX ORDER — MIDODRINE HYDROCHLORIDE 5 MG/1
5 TABLET ORAL
Status: DISCONTINUED | OUTPATIENT
Start: 2023-07-07 | End: 2023-07-08

## 2023-07-07 RX ORDER — PHENYLEPHRINE HCL IN 0.9% NACL 50MG/250ML
10-300 PLASTIC BAG, INJECTION (ML) INTRAVENOUS CONTINUOUS
Status: DISCONTINUED | OUTPATIENT
Start: 2023-07-07 | End: 2023-07-07 | Stop reason: SDUPTHER

## 2023-07-07 RX ORDER — NOREPINEPHRINE BITARTRATE 0.06 MG/ML
1-100 INJECTION, SOLUTION INTRAVENOUS CONTINUOUS
Status: DISCONTINUED | OUTPATIENT
Start: 2023-07-07 | End: 2023-07-07

## 2023-07-07 RX ADMIN — SODIUM CHLORIDE, PRESERVATIVE FREE 10 ML: 5 INJECTION INTRAVENOUS at 09:14

## 2023-07-07 RX ADMIN — IPRATROPIUM BROMIDE AND ALBUTEROL SULFATE 1 DOSE: .5; 2.5 SOLUTION RESPIRATORY (INHALATION) at 19:32

## 2023-07-07 RX ADMIN — MIDODRINE HYDROCHLORIDE 5 MG: 5 TABLET ORAL at 17:54

## 2023-07-07 RX ADMIN — SENNOSIDES AND DOCUSATE SODIUM 1 TABLET: 50; 8.6 TABLET ORAL at 09:13

## 2023-07-07 RX ADMIN — SODIUM CHLORIDE, PRESERVATIVE FREE 10 ML: 5 INJECTION INTRAVENOUS at 21:17

## 2023-07-07 RX ADMIN — Medication 1000 UNITS: at 09:13

## 2023-07-07 RX ADMIN — METOPROLOL SUCCINATE 25 MG: 25 TABLET, EXTENDED RELEASE ORAL at 09:13

## 2023-07-07 RX ADMIN — CEFTRIAXONE SODIUM 1000 MG: 1 INJECTION, POWDER, FOR SOLUTION INTRAMUSCULAR; INTRAVENOUS at 09:15

## 2023-07-07 RX ADMIN — FUROSEMIDE 40 MG: 10 INJECTION, SOLUTION INTRAMUSCULAR; INTRAVENOUS at 18:00

## 2023-07-07 RX ADMIN — ASPIRIN 81 MG CHEWABLE TABLET 81 MG: 81 TABLET CHEWABLE at 09:13

## 2023-07-07 RX ADMIN — GABAPENTIN 300 MG: 300 CAPSULE ORAL at 09:13

## 2023-07-07 RX ADMIN — GABAPENTIN 300 MG: 300 CAPSULE ORAL at 21:17

## 2023-07-07 RX ADMIN — IPRATROPIUM BROMIDE AND ALBUTEROL SULFATE 1 DOSE: .5; 2.5 SOLUTION RESPIRATORY (INHALATION) at 08:25

## 2023-07-07 RX ADMIN — FLUCONAZOLE 100 MG: 200 INJECTION, SOLUTION INTRAVENOUS at 11:51

## 2023-07-07 RX ADMIN — ATORVASTATIN CALCIUM 40 MG: 40 TABLET, FILM COATED ORAL at 09:13

## 2023-07-07 ASSESSMENT — PAIN SCALES - GENERAL: PAINLEVEL_OUTOF10: 3

## 2023-07-07 ASSESSMENT — PAIN DESCRIPTION - DESCRIPTORS: DESCRIPTORS: TENDER

## 2023-07-07 ASSESSMENT — PAIN DESCRIPTION - ORIENTATION: ORIENTATION: POSTERIOR

## 2023-07-07 ASSESSMENT — PAIN DESCRIPTION - LOCATION: LOCATION: LEG;OTHER (COMMENT)

## 2023-07-08 LAB
ANION GAP SERPL CALCULATED.3IONS-SCNC: 9 MMOL/L (ref 9–17)
BUN SERPL-MCNC: 39 MG/DL (ref 8–23)
C3 SERPL-MCNC: 164 MG/DL (ref 90–180)
C4 SERPL-MCNC: 36 MG/DL (ref 10–40)
CALCIUM SERPL-MCNC: 9.3 MG/DL (ref 8.6–10.4)
CHLORIDE SERPL-SCNC: 90 MMOL/L (ref 98–107)
CO2 SERPL-SCNC: 36 MMOL/L (ref 20–31)
CREAT SERPL-MCNC: 1.53 MG/DL (ref 0.5–0.9)
CREAT UR-MCNC: 47.2 MG/DL (ref 28–217)
CREAT UR-MCNC: 47.8 MG/DL (ref 28–217)
FREE KAPPA/LAMBDA RATIO: 1.65 (ref 0.26–1.65)
GFR SERPL CREATININE-BSD FRML MDRD: 36 ML/MIN/1.73M2
GLUCOSE BLD-MCNC: 172 MG/DL (ref 65–105)
GLUCOSE BLD-MCNC: 199 MG/DL (ref 65–105)
GLUCOSE BLD-MCNC: 211 MG/DL (ref 65–105)
GLUCOSE BLD-MCNC: 213 MG/DL (ref 65–105)
GLUCOSE SERPL-MCNC: 166 MG/DL (ref 70–99)
HAV IGM SERPL QL IA: NONREACTIVE
HBV CORE IGM SERPL QL IA: NONREACTIVE
HBV SURFACE AG SERPL QL IA: NONREACTIVE
HCV AB SERPL QL IA: NONREACTIVE
KAPPA LC FREE SER-MCNC: 19.63 MG/DL (ref 0.37–1.94)
LAMBDA LC FREE SERPL-MCNC: 11.9 MG/DL (ref 0.57–2.63)
MAGNESIUM SERPL-MCNC: 2.4 MG/DL (ref 1.6–2.6)
POTASSIUM SERPL-SCNC: 4.6 MMOL/L (ref 3.7–5.3)
PROCALCITONIN SERPL-MCNC: 0.35 NG/ML
SODIUM SERPL-SCNC: 135 MMOL/L (ref 135–144)
TOTAL PROTEIN, URINE: 25 MG/DL
TOTAL PROTEIN, URINE: 27 MG/DL
URINE TOTAL PROTEIN CREATININE RATIO: 0.56 (ref 0–0.2)

## 2023-07-08 PROCEDURE — 51798 US URINE CAPACITY MEASURE: CPT

## 2023-07-08 PROCEDURE — 2060000000 HC ICU INTERMEDIATE R&B

## 2023-07-08 PROCEDURE — 97530 THERAPEUTIC ACTIVITIES: CPT

## 2023-07-08 PROCEDURE — 6370000000 HC RX 637 (ALT 250 FOR IP): Performed by: FAMILY MEDICINE

## 2023-07-08 PROCEDURE — 2580000003 HC RX 258: Performed by: HOSPITALIST

## 2023-07-08 PROCEDURE — 94640 AIRWAY INHALATION TREATMENT: CPT

## 2023-07-08 PROCEDURE — 2700000000 HC OXYGEN THERAPY PER DAY

## 2023-07-08 PROCEDURE — 97535 SELF CARE MNGMENT TRAINING: CPT

## 2023-07-08 PROCEDURE — 6370000000 HC RX 637 (ALT 250 FOR IP): Performed by: NURSE PRACTITIONER

## 2023-07-08 PROCEDURE — 94669 MECHANICAL CHEST WALL OSCILL: CPT

## 2023-07-08 PROCEDURE — 82947 ASSAY GLUCOSE BLOOD QUANT: CPT

## 2023-07-08 PROCEDURE — 94761 N-INVAS EAR/PLS OXIMETRY MLT: CPT

## 2023-07-08 PROCEDURE — 6360000002 HC RX W HCPCS: Performed by: HOSPITALIST

## 2023-07-08 PROCEDURE — 83735 ASSAY OF MAGNESIUM: CPT

## 2023-07-08 PROCEDURE — 99232 SBSQ HOSP IP/OBS MODERATE 35: CPT | Performed by: INTERNAL MEDICINE

## 2023-07-08 PROCEDURE — 2580000003 HC RX 258: Performed by: NURSE PRACTITIONER

## 2023-07-08 PROCEDURE — 6370000000 HC RX 637 (ALT 250 FOR IP): Performed by: HOSPITALIST

## 2023-07-08 PROCEDURE — 80048 BASIC METABOLIC PNL TOTAL CA: CPT

## 2023-07-08 PROCEDURE — 36415 COLL VENOUS BLD VENIPUNCTURE: CPT

## 2023-07-08 PROCEDURE — 99232 SBSQ HOSP IP/OBS MODERATE 35: CPT | Performed by: HOSPITALIST

## 2023-07-08 RX ORDER — TORSEMIDE 20 MG/1
40 TABLET ORAL DAILY
Status: DISCONTINUED | OUTPATIENT
Start: 2023-07-09 | End: 2023-07-11 | Stop reason: HOSPADM

## 2023-07-08 RX ORDER — FLUCONAZOLE 100 MG/1
100 TABLET ORAL DAILY
Status: DISCONTINUED | OUTPATIENT
Start: 2023-07-08 | End: 2023-07-11 | Stop reason: HOSPADM

## 2023-07-08 RX ADMIN — SODIUM CHLORIDE, PRESERVATIVE FREE 10 ML: 5 INJECTION INTRAVENOUS at 09:04

## 2023-07-08 RX ADMIN — FLUCONAZOLE 100 MG: 100 TABLET ORAL at 12:11

## 2023-07-08 RX ADMIN — ASPIRIN 81 MG CHEWABLE TABLET 81 MG: 81 TABLET CHEWABLE at 09:04

## 2023-07-08 RX ADMIN — INSULIN LISPRO 1 UNITS: 100 INJECTION, SOLUTION INTRAVENOUS; SUBCUTANEOUS at 18:32

## 2023-07-08 RX ADMIN — SODIUM CHLORIDE, PRESERVATIVE FREE 10 ML: 5 INJECTION INTRAVENOUS at 20:26

## 2023-07-08 RX ADMIN — METOPROLOL SUCCINATE 25 MG: 25 TABLET, EXTENDED RELEASE ORAL at 09:04

## 2023-07-08 RX ADMIN — ATORVASTATIN CALCIUM 40 MG: 40 TABLET, FILM COATED ORAL at 09:04

## 2023-07-08 RX ADMIN — IPRATROPIUM BROMIDE AND ALBUTEROL SULFATE 1 DOSE: .5; 2.5 SOLUTION RESPIRATORY (INHALATION) at 08:19

## 2023-07-08 RX ADMIN — IPRATROPIUM BROMIDE AND ALBUTEROL SULFATE 1 DOSE: .5; 2.5 SOLUTION RESPIRATORY (INHALATION) at 20:36

## 2023-07-08 RX ADMIN — GABAPENTIN 300 MG: 300 CAPSULE ORAL at 20:26

## 2023-07-08 RX ADMIN — GABAPENTIN 300 MG: 300 CAPSULE ORAL at 12:11

## 2023-07-08 RX ADMIN — SENNOSIDES AND DOCUSATE SODIUM 1 TABLET: 50; 8.6 TABLET ORAL at 09:04

## 2023-07-08 RX ADMIN — CEFTRIAXONE SODIUM 1000 MG: 1 INJECTION, POWDER, FOR SOLUTION INTRAMUSCULAR; INTRAVENOUS at 09:06

## 2023-07-08 RX ADMIN — GABAPENTIN 300 MG: 300 CAPSULE ORAL at 09:04

## 2023-07-08 RX ADMIN — Medication 1000 UNITS: at 09:04

## 2023-07-09 PROBLEM — N17.0 ACUTE RENAL FAILURE WITH TUBULAR NECROSIS (HCC): Status: ACTIVE | Noted: 2023-07-09

## 2023-07-09 LAB
ALLEN TEST: POSITIVE
ANION GAP SERPL CALCULATED.3IONS-SCNC: 12 MMOL/L (ref 9–17)
BUN SERPL-MCNC: 41 MG/DL (ref 8–23)
CALCIUM SERPL-MCNC: 9.1 MG/DL (ref 8.6–10.4)
CHLORIDE SERPL-SCNC: 89 MMOL/L (ref 98–107)
CO2 SERPL-SCNC: 34 MMOL/L (ref 20–31)
CREAT SERPL-MCNC: 1.43 MG/DL (ref 0.5–0.9)
FIO2: 32
GFR SERPL CREATININE-BSD FRML MDRD: 39 ML/MIN/1.73M2
GLUCOSE BLD-MCNC: 148 MG/DL (ref 65–105)
GLUCOSE BLD-MCNC: 163 MG/DL (ref 65–105)
GLUCOSE BLD-MCNC: 215 MG/DL (ref 65–105)
GLUCOSE BLD-MCNC: 215 MG/DL (ref 65–105)
GLUCOSE SERPL-MCNC: 230 MG/DL (ref 70–99)
MAGNESIUM SERPL-MCNC: 2.6 MG/DL (ref 1.6–2.6)
O2 DEVICE/FLOW/%: ABNORMAL
POC HCO3: 37.1 MMOL/L (ref 21–28)
POC O2 SATURATION: 96 % (ref 94–98)
POC PCO2: 59.3 MM HG (ref 35–48)
POC PH: 7.4 (ref 7.35–7.45)
POC PO2: 82.6 MM HG (ref 83–108)
POSITIVE BASE EXCESS, ART: 10 MMOL/L (ref 0–3)
POTASSIUM SERPL-SCNC: 4.6 MMOL/L (ref 3.7–5.3)
SAMPLE SITE: ABNORMAL
SODIUM SERPL-SCNC: 135 MMOL/L (ref 135–144)

## 2023-07-09 PROCEDURE — 6370000000 HC RX 637 (ALT 250 FOR IP): Performed by: HOSPITALIST

## 2023-07-09 PROCEDURE — 82803 BLOOD GASES ANY COMBINATION: CPT

## 2023-07-09 PROCEDURE — 80048 BASIC METABOLIC PNL TOTAL CA: CPT

## 2023-07-09 PROCEDURE — 94760 N-INVAS EAR/PLS OXIMETRY 1: CPT

## 2023-07-09 PROCEDURE — 94640 AIRWAY INHALATION TREATMENT: CPT

## 2023-07-09 PROCEDURE — 99232 SBSQ HOSP IP/OBS MODERATE 35: CPT | Performed by: HOSPITALIST

## 2023-07-09 PROCEDURE — 6370000000 HC RX 637 (ALT 250 FOR IP): Performed by: FAMILY MEDICINE

## 2023-07-09 PROCEDURE — 2580000003 HC RX 258: Performed by: NURSE PRACTITIONER

## 2023-07-09 PROCEDURE — 82947 ASSAY GLUCOSE BLOOD QUANT: CPT

## 2023-07-09 PROCEDURE — 36415 COLL VENOUS BLD VENIPUNCTURE: CPT

## 2023-07-09 PROCEDURE — 83735 ASSAY OF MAGNESIUM: CPT

## 2023-07-09 PROCEDURE — 6370000000 HC RX 637 (ALT 250 FOR IP): Performed by: INTERNAL MEDICINE

## 2023-07-09 PROCEDURE — 6370000000 HC RX 637 (ALT 250 FOR IP): Performed by: NURSE PRACTITIONER

## 2023-07-09 PROCEDURE — 36600 WITHDRAWAL OF ARTERIAL BLOOD: CPT

## 2023-07-09 PROCEDURE — 2700000000 HC OXYGEN THERAPY PER DAY

## 2023-07-09 PROCEDURE — 2060000000 HC ICU INTERMEDIATE R&B

## 2023-07-09 PROCEDURE — 99232 SBSQ HOSP IP/OBS MODERATE 35: CPT | Performed by: INTERNAL MEDICINE

## 2023-07-09 RX ORDER — LISINOPRIL 2.5 MG/1
2.5 TABLET ORAL DAILY
Status: DISCONTINUED | OUTPATIENT
Start: 2023-07-09 | End: 2023-07-10

## 2023-07-09 RX ADMIN — ATORVASTATIN CALCIUM 40 MG: 40 TABLET, FILM COATED ORAL at 09:00

## 2023-07-09 RX ADMIN — SERTRALINE HYDROCHLORIDE 150 MG: 50 TABLET ORAL at 09:01

## 2023-07-09 RX ADMIN — ASPIRIN 81 MG CHEWABLE TABLET 81 MG: 81 TABLET CHEWABLE at 09:01

## 2023-07-09 RX ADMIN — GABAPENTIN 300 MG: 300 CAPSULE ORAL at 14:08

## 2023-07-09 RX ADMIN — IPRATROPIUM BROMIDE AND ALBUTEROL SULFATE 1 DOSE: .5; 2.5 SOLUTION RESPIRATORY (INHALATION) at 07:56

## 2023-07-09 RX ADMIN — INSULIN LISPRO 1 UNITS: 100 INJECTION, SOLUTION INTRAVENOUS; SUBCUTANEOUS at 17:54

## 2023-07-09 RX ADMIN — SENNOSIDES AND DOCUSATE SODIUM 1 TABLET: 50; 8.6 TABLET ORAL at 09:01

## 2023-07-09 RX ADMIN — GABAPENTIN 300 MG: 300 CAPSULE ORAL at 09:01

## 2023-07-09 RX ADMIN — GABAPENTIN 300 MG: 300 CAPSULE ORAL at 20:35

## 2023-07-09 RX ADMIN — LISINOPRIL 2.5 MG: 2.5 TABLET ORAL at 12:10

## 2023-07-09 RX ADMIN — IPRATROPIUM BROMIDE AND ALBUTEROL SULFATE 1 DOSE: .5; 2.5 SOLUTION RESPIRATORY (INHALATION) at 20:49

## 2023-07-09 RX ADMIN — TORSEMIDE 40 MG: 20 TABLET ORAL at 09:00

## 2023-07-09 RX ADMIN — SODIUM CHLORIDE, PRESERVATIVE FREE 10 ML: 5 INJECTION INTRAVENOUS at 12:10

## 2023-07-09 RX ADMIN — FLUCONAZOLE 100 MG: 100 TABLET ORAL at 09:00

## 2023-07-09 RX ADMIN — SODIUM CHLORIDE, PRESERVATIVE FREE 10 ML: 5 INJECTION INTRAVENOUS at 20:37

## 2023-07-09 RX ADMIN — Medication 1000 UNITS: at 09:01

## 2023-07-09 RX ADMIN — METOPROLOL SUCCINATE 25 MG: 25 TABLET, EXTENDED RELEASE ORAL at 09:00

## 2023-07-09 ASSESSMENT — PAIN SCALES - GENERAL: PAINLEVEL_OUTOF10: 0

## 2023-07-10 LAB
ANION GAP SERPL CALCULATED.3IONS-SCNC: 9 MMOL/L (ref 9–17)
BUN SERPL-MCNC: 53 MG/DL (ref 8–23)
CALCIUM SERPL-MCNC: 9.2 MG/DL (ref 8.6–10.4)
CHLORIDE SERPL-SCNC: 90 MMOL/L (ref 98–107)
CO2 SERPL-SCNC: 33 MMOL/L (ref 20–31)
CREAT SERPL-MCNC: 2.1 MG/DL (ref 0.5–0.9)
GFR SERPL CREATININE-BSD FRML MDRD: 25 ML/MIN/1.73M2
GLUCOSE BLD-MCNC: 174 MG/DL (ref 65–105)
GLUCOSE BLD-MCNC: 178 MG/DL (ref 65–105)
GLUCOSE BLD-MCNC: 195 MG/DL (ref 65–105)
GLUCOSE BLD-MCNC: 232 MG/DL (ref 65–105)
GLUCOSE SERPL-MCNC: 180 MG/DL (ref 70–99)
MAGNESIUM SERPL-MCNC: 2.6 MG/DL (ref 1.6–2.6)
POTASSIUM SERPL-SCNC: 4.8 MMOL/L (ref 3.7–5.3)
SODIUM SERPL-SCNC: 132 MMOL/L (ref 135–144)

## 2023-07-10 PROCEDURE — 97110 THERAPEUTIC EXERCISES: CPT

## 2023-07-10 PROCEDURE — 99232 SBSQ HOSP IP/OBS MODERATE 35: CPT | Performed by: INTERNAL MEDICINE

## 2023-07-10 PROCEDURE — 94640 AIRWAY INHALATION TREATMENT: CPT

## 2023-07-10 PROCEDURE — 6370000000 HC RX 637 (ALT 250 FOR IP): Performed by: FAMILY MEDICINE

## 2023-07-10 PROCEDURE — 82947 ASSAY GLUCOSE BLOOD QUANT: CPT

## 2023-07-10 PROCEDURE — 2580000003 HC RX 258: Performed by: NURSE PRACTITIONER

## 2023-07-10 PROCEDURE — 2580000003 HC RX 258: Performed by: INTERNAL MEDICINE

## 2023-07-10 PROCEDURE — 99233 SBSQ HOSP IP/OBS HIGH 50: CPT | Performed by: NURSE PRACTITIONER

## 2023-07-10 PROCEDURE — 83735 ASSAY OF MAGNESIUM: CPT

## 2023-07-10 PROCEDURE — 6370000000 HC RX 637 (ALT 250 FOR IP): Performed by: HOSPITALIST

## 2023-07-10 PROCEDURE — 94669 MECHANICAL CHEST WALL OSCILL: CPT

## 2023-07-10 PROCEDURE — 99232 SBSQ HOSP IP/OBS MODERATE 35: CPT | Performed by: HOSPITALIST

## 2023-07-10 PROCEDURE — 80048 BASIC METABOLIC PNL TOTAL CA: CPT

## 2023-07-10 PROCEDURE — 2700000000 HC OXYGEN THERAPY PER DAY

## 2023-07-10 PROCEDURE — 97530 THERAPEUTIC ACTIVITIES: CPT

## 2023-07-10 PROCEDURE — 36415 COLL VENOUS BLD VENIPUNCTURE: CPT

## 2023-07-10 PROCEDURE — 2060000000 HC ICU INTERMEDIATE R&B

## 2023-07-10 PROCEDURE — 94760 N-INVAS EAR/PLS OXIMETRY 1: CPT

## 2023-07-10 PROCEDURE — 6370000000 HC RX 637 (ALT 250 FOR IP): Performed by: NURSE PRACTITIONER

## 2023-07-10 RX ADMIN — SODIUM CHLORIDE: 9 INJECTION, SOLUTION INTRAVENOUS at 16:56

## 2023-07-10 RX ADMIN — INSULIN LISPRO 1 UNITS: 100 INJECTION, SOLUTION INTRAVENOUS; SUBCUTANEOUS at 12:50

## 2023-07-10 RX ADMIN — GABAPENTIN 300 MG: 300 CAPSULE ORAL at 20:47

## 2023-07-10 RX ADMIN — SERTRALINE HYDROCHLORIDE 150 MG: 50 TABLET ORAL at 10:11

## 2023-07-10 RX ADMIN — GABAPENTIN 300 MG: 300 CAPSULE ORAL at 10:11

## 2023-07-10 RX ADMIN — SODIUM CHLORIDE, PRESERVATIVE FREE 10 ML: 5 INJECTION INTRAVENOUS at 10:12

## 2023-07-10 RX ADMIN — IPRATROPIUM BROMIDE AND ALBUTEROL SULFATE 1 DOSE: .5; 2.5 SOLUTION RESPIRATORY (INHALATION) at 08:56

## 2023-07-10 RX ADMIN — Medication 1000 UNITS: at 10:11

## 2023-07-10 RX ADMIN — HYDROCODONE BITARTRATE AND ACETAMINOPHEN 1 TABLET: 5; 325 TABLET ORAL at 21:31

## 2023-07-10 RX ADMIN — ASPIRIN 81 MG CHEWABLE TABLET 81 MG: 81 TABLET CHEWABLE at 10:11

## 2023-07-10 RX ADMIN — FLUCONAZOLE 100 MG: 100 TABLET ORAL at 10:11

## 2023-07-10 RX ADMIN — ACETAMINOPHEN 650 MG: 325 TABLET ORAL at 10:19

## 2023-07-10 RX ADMIN — IPRATROPIUM BROMIDE AND ALBUTEROL SULFATE 1 DOSE: .5; 2.5 SOLUTION RESPIRATORY (INHALATION) at 20:35

## 2023-07-10 RX ADMIN — GABAPENTIN 300 MG: 300 CAPSULE ORAL at 12:51

## 2023-07-10 RX ADMIN — ATORVASTATIN CALCIUM 40 MG: 40 TABLET, FILM COATED ORAL at 10:11

## 2023-07-10 RX ADMIN — SODIUM CHLORIDE, PRESERVATIVE FREE 10 ML: 5 INJECTION INTRAVENOUS at 20:47

## 2023-07-10 RX ADMIN — SENNOSIDES AND DOCUSATE SODIUM 1 TABLET: 50; 8.6 TABLET ORAL at 10:11

## 2023-07-10 ASSESSMENT — PAIN DESCRIPTION - LOCATION
LOCATION: BACK
LOCATION: BACK

## 2023-07-10 ASSESSMENT — PAIN SCALES - GENERAL
PAINLEVEL_OUTOF10: 6
PAINLEVEL_OUTOF10: 3

## 2023-07-10 ASSESSMENT — PAIN DESCRIPTION - ORIENTATION: ORIENTATION: LOWER

## 2023-07-10 ASSESSMENT — PAIN DESCRIPTION - ONSET: ONSET: ON-GOING

## 2023-07-10 ASSESSMENT — PAIN DESCRIPTION - DESCRIPTORS: DESCRIPTORS: DISCOMFORT

## 2023-07-10 ASSESSMENT — PAIN - FUNCTIONAL ASSESSMENT: PAIN_FUNCTIONAL_ASSESSMENT: ACTIVITIES ARE NOT PREVENTED

## 2023-07-10 ASSESSMENT — PAIN DESCRIPTION - FREQUENCY: FREQUENCY: INTERMITTENT

## 2023-07-10 ASSESSMENT — PAIN DESCRIPTION - PAIN TYPE: TYPE: CHRONIC PAIN

## 2023-07-11 VITALS
TEMPERATURE: 98.2 F | HEIGHT: 65 IN | HEART RATE: 79 BPM | SYSTOLIC BLOOD PRESSURE: 116 MMHG | OXYGEN SATURATION: 98 % | WEIGHT: 219.36 LBS | DIASTOLIC BLOOD PRESSURE: 68 MMHG | BODY MASS INDEX: 36.55 KG/M2 | RESPIRATION RATE: 18 BRPM

## 2023-07-11 LAB
ALBUMIN PERCENT: 47 % (ref 45–65)
ALBUMIN SERPL-MCNC: 3.2 G/DL (ref 3.2–5.2)
ALPHA 2 PERCENT: 17 % (ref 6–13)
ALPHA1 GLOB SERPL ELPH-MCNC: 0.3 G/DL (ref 0.1–0.4)
ALPHA1 GLOB SERPL ELPH-MCNC: 4 % (ref 3–6)
ALPHA2 GLOB SERPL ELPH-MCNC: 1.1 G/DL (ref 0.5–0.9)
ANA SER QL IA: NEGATIVE
ANION GAP SERPL CALCULATED.3IONS-SCNC: 10 MMOL/L (ref 9–17)
B-GLOBULIN SERPL ELPH-MCNC: 0.8 G/DL (ref 0.5–1.1)
B-GLOBULIN SERPL ELPH-MCNC: 12 % (ref 11–19)
BUN SERPL-MCNC: 59 MG/DL (ref 8–23)
CALCIUM SERPL-MCNC: 8.8 MG/DL (ref 8.6–10.4)
CHLORIDE SERPL-SCNC: 91 MMOL/L (ref 98–107)
CO2 SERPL-SCNC: 30 MMOL/L (ref 20–31)
CREAT SERPL-MCNC: 1.9 MG/DL (ref 0.5–0.9)
DSDNA IGG SER QL IA: 3.2 IU/ML
GAMMA GLOB SERPL ELPH-MCNC: 1.4 G/DL (ref 0.5–1.5)
GAMMA GLOBULIN %: 20 % (ref 9–20)
GFR SERPL CREATININE-BSD FRML MDRD: 28 ML/MIN/1.73M2
GLUCOSE BLD-MCNC: 157 MG/DL (ref 65–105)
GLUCOSE BLD-MCNC: 177 MG/DL (ref 65–105)
GLUCOSE BLD-MCNC: 261 MG/DL (ref 65–105)
GLUCOSE SERPL-MCNC: 181 MG/DL (ref 70–99)
INTERPRETATION SERPL IFE-IMP: NORMAL
NUCLEAR IGG SER IA-RTO: 0.3 U/ML
P E INTERPRETATION, U: NORMAL
PATHOLOGIST: ABNORMAL
PATHOLOGIST: NORMAL
PATHOLOGIST: NORMAL
POTASSIUM SERPL-SCNC: 4.8 MMOL/L (ref 3.7–5.3)
PROT PATTERN SERPL ELPH-IMP: ABNORMAL
PROT SERPL-MCNC: 6.8 G/DL (ref 6.4–8.3)
SODIUM SERPL-SCNC: 131 MMOL/L (ref 135–144)
SPECIMEN TYPE: NORMAL
SPECIMEN TYPE: NORMAL
SPECIMEN VOL UR: NORMAL ML
TOTAL PROT. SUM,%: 100 % (ref 98–102)
TOTAL PROT. SUM: 6.8 G/DL (ref 6.3–8.2)
URINE IFX INTERP: NORMAL
URINE TOTAL PROTEIN: 27 MG/DL
URINE TOTAL PROTEIN: 27 MG/DL

## 2023-07-11 PROCEDURE — 36415 COLL VENOUS BLD VENIPUNCTURE: CPT

## 2023-07-11 PROCEDURE — 2700000000 HC OXYGEN THERAPY PER DAY

## 2023-07-11 PROCEDURE — 94669 MECHANICAL CHEST WALL OSCILL: CPT

## 2023-07-11 PROCEDURE — 6370000000 HC RX 637 (ALT 250 FOR IP): Performed by: FAMILY MEDICINE

## 2023-07-11 PROCEDURE — 80048 BASIC METABOLIC PNL TOTAL CA: CPT

## 2023-07-11 PROCEDURE — 2580000003 HC RX 258: Performed by: NURSE PRACTITIONER

## 2023-07-11 PROCEDURE — 97110 THERAPEUTIC EXERCISES: CPT

## 2023-07-11 PROCEDURE — 94760 N-INVAS EAR/PLS OXIMETRY 1: CPT

## 2023-07-11 PROCEDURE — 94618 PULMONARY STRESS TESTING: CPT

## 2023-07-11 PROCEDURE — 6370000000 HC RX 637 (ALT 250 FOR IP): Performed by: HOSPITALIST

## 2023-07-11 PROCEDURE — 99232 SBSQ HOSP IP/OBS MODERATE 35: CPT | Performed by: INTERNAL MEDICINE

## 2023-07-11 PROCEDURE — 6370000000 HC RX 637 (ALT 250 FOR IP): Performed by: NURSE PRACTITIONER

## 2023-07-11 PROCEDURE — 99238 HOSP IP/OBS DSCHRG MGMT 30/<: CPT | Performed by: STUDENT IN AN ORGANIZED HEALTH CARE EDUCATION/TRAINING PROGRAM

## 2023-07-11 PROCEDURE — 82947 ASSAY GLUCOSE BLOOD QUANT: CPT

## 2023-07-11 PROCEDURE — 51798 US URINE CAPACITY MEASURE: CPT

## 2023-07-11 PROCEDURE — 94640 AIRWAY INHALATION TREATMENT: CPT

## 2023-07-11 RX ORDER — GLIPIZIDE 10 MG/1
10 TABLET ORAL 2 TIMES DAILY
Qty: 60 TABLET | Refills: 3 | Status: ON HOLD | OUTPATIENT
Start: 2023-07-11

## 2023-07-11 RX ORDER — FLUCONAZOLE 100 MG/1
100 TABLET ORAL DAILY
Qty: 4 TABLET | Refills: 0 | Status: SHIPPED | OUTPATIENT
Start: 2023-07-11 | End: 2023-07-15

## 2023-07-11 RX ADMIN — SODIUM CHLORIDE: 9 INJECTION, SOLUTION INTRAVENOUS at 05:48

## 2023-07-11 RX ADMIN — SERTRALINE HYDROCHLORIDE 150 MG: 50 TABLET ORAL at 09:14

## 2023-07-11 RX ADMIN — ACETAMINOPHEN 650 MG: 325 TABLET ORAL at 10:56

## 2023-07-11 RX ADMIN — Medication 1000 UNITS: at 09:14

## 2023-07-11 RX ADMIN — METOPROLOL SUCCINATE 25 MG: 25 TABLET, EXTENDED RELEASE ORAL at 09:14

## 2023-07-11 RX ADMIN — INSULIN LISPRO 2 UNITS: 100 INJECTION, SOLUTION INTRAVENOUS; SUBCUTANEOUS at 11:57

## 2023-07-11 RX ADMIN — SODIUM CHLORIDE, PRESERVATIVE FREE 10 ML: 5 INJECTION INTRAVENOUS at 09:15

## 2023-07-11 RX ADMIN — FLUCONAZOLE 100 MG: 100 TABLET ORAL at 09:14

## 2023-07-11 RX ADMIN — GABAPENTIN 300 MG: 300 CAPSULE ORAL at 09:14

## 2023-07-11 RX ADMIN — GABAPENTIN 300 MG: 300 CAPSULE ORAL at 13:22

## 2023-07-11 RX ADMIN — IPRATROPIUM BROMIDE AND ALBUTEROL SULFATE 1 DOSE: .5; 2.5 SOLUTION RESPIRATORY (INHALATION) at 08:39

## 2023-07-11 RX ADMIN — ASPIRIN 81 MG CHEWABLE TABLET 81 MG: 81 TABLET CHEWABLE at 09:14

## 2023-07-11 RX ADMIN — ATORVASTATIN CALCIUM 40 MG: 40 TABLET, FILM COATED ORAL at 09:14

## 2023-07-11 RX ADMIN — SENNOSIDES AND DOCUSATE SODIUM 1 TABLET: 50; 8.6 TABLET ORAL at 09:15

## 2023-07-11 ASSESSMENT — PAIN - FUNCTIONAL ASSESSMENT
PAIN_FUNCTIONAL_ASSESSMENT: ACTIVITIES ARE NOT PREVENTED
PAIN_FUNCTIONAL_ASSESSMENT: ACTIVITIES ARE NOT PREVENTED

## 2023-07-11 ASSESSMENT — PAIN SCALES - GENERAL
PAINLEVEL_OUTOF10: 3
PAINLEVEL_OUTOF10: 3
PAINLEVEL_OUTOF10: 0
PAINLEVEL_OUTOF10: 4
PAINLEVEL_OUTOF10: 3
PAINLEVEL_OUTOF10: 0

## 2023-07-11 ASSESSMENT — PAIN DESCRIPTION - FREQUENCY: FREQUENCY: INTERMITTENT

## 2023-07-11 ASSESSMENT — PAIN DESCRIPTION - PAIN TYPE
TYPE: ACUTE PAIN
TYPE: ACUTE PAIN

## 2023-07-11 ASSESSMENT — PAIN DESCRIPTION - ORIENTATION
ORIENTATION: INNER
ORIENTATION: INNER

## 2023-07-11 ASSESSMENT — PAIN DESCRIPTION - ONSET
ONSET: SUDDEN
ONSET: SUDDEN

## 2023-07-11 ASSESSMENT — PAIN DESCRIPTION - LOCATION
LOCATION: HEAD
LOCATION: HEAD

## 2023-07-11 ASSESSMENT — PAIN DESCRIPTION - DESCRIPTORS
DESCRIPTORS: ACHING
DESCRIPTORS: ACHING

## 2023-07-11 NOTE — PLAN OF CARE
Problem: Discharge Planning  Goal: Discharge to home or other facility with appropriate resources  Outcome: Progressing  Flowsheets  Taken 7/10/2023 1600 by Sp Moon  Discharge to home or other facility with appropriate resources:   Identify barriers to discharge with patient and caregiver   Arrange for needed discharge resources and transportation as appropriate   Identify discharge learning needs (meds, wound care, etc)  Taken 7/10/2023 1200 by Sp Moon  Discharge to home or other facility with appropriate resources:   Identify barriers to discharge with patient and caregiver   Arrange for needed discharge resources and transportation as appropriate   Identify discharge learning needs (meds, wound care, etc)  Taken 7/10/2023 0800 by Sp Moon  Discharge to home or other facility with appropriate resources:   Identify barriers to discharge with patient and caregiver   Arrange for needed discharge resources and transportation as appropriate   Identify discharge learning needs (meds, wound care, etc)     Problem: Safety - Adult  Goal: Free from fall injury  Outcome: Progressing   Fall assessment preformed. Bed in low locked position with call light and tray table within reach. Education given.   Problem: Neurosensory - Adult  Goal: Achieves stable or improved neurological status  Outcome: Progressing  Flowsheets  Taken 7/10/2023 1600 by Elvia Cheema stable or improved neurological status: Assess for and report changes in neurological status  Taken 7/10/2023 1200 by Elvia Leroye stable or improved neurological status: Assess for and report changes in neurological status  Taken 7/10/2023 0800 by Elvia Deni stable or improved neurological status: Assess for and report changes in neurological status     Problem: Neurosensory - Adult  Goal: Absence of seizures  Outcome: Progressing  Flowsheets  Taken 7/10/2023 1600 by Angeles Garcia  Absence of seizures: Monitor for seizure
Problem: Respiratory - Adult  Goal: Achieves optimal ventilation and oxygenation  7/10/2023 2200 by Alan Braden RCP  Outcome: Progressing  Flowsheets (Taken 7/10/2023 2200)  Achieves optimal ventilation and oxygenation:   Assess for changes in respiratory status   Assess for changes in mentation and behavior   Oxygen supplementation based on oxygen saturation or arterial blood gases   Assess and instruct to report shortness of breath or any respiratory difficulty   Respiratory therapy support as indicated  Note: BRONCHOSPASM/BRONCHOCONSTRICTION     [x]         IMPROVE AERATION/BREATH SOUNDS  [x]   ADMINISTER BRONCHODILATOR THERAPY AS APPROPRIATE     ASSESS BREATH SOUNDS  [x]   IMPLEMENT AEROSOL/MDI PROTOCOL  [x]   PATIENT EDUCATION AS NEEDED   [x]
and comorbid symptoms for stability, deterioration, or improvement   Collaborate with multidisciplinary team to address chronic and comorbid conditions and prevent exacerbation or deterioration   Update acute care plan with appropriate goals if chronic or comorbid symptoms are exacerbated and prevent overall improvement and discharge  Taken 7/11/2023 1200 by [address] - Patient's Chronic Conditions and Co-Morbidity Symptoms are Monitored and Maintained or Improved:   Monitor and assess patient's chronic conditions and comorbid symptoms for stability, deterioration, or improvement   Collaborate with multidisciplinary team to address chronic and comorbid conditions and prevent exacerbation or deterioration   Update acute care plan with appropriate goals if chronic or comorbid symptoms are exacerbated and prevent overall improvement and discharge  Taken 7/11/2023 0817 by [address] - Patient's Chronic Conditions and Co-Morbidity Symptoms are Monitored and Maintained or Improved:   Monitor and assess patient's chronic conditions and comorbid symptoms for stability, deterioration, or improvement   Collaborate with multidisciplinary team to address chronic and comorbid conditions and prevent exacerbation or deterioration   Update acute care plan with appropriate goals if chronic or comorbid symptoms are exacerbated and prevent overall improvement and discharge     Problem: Pain  Goal: Verbalizes/displays adequate comfort level or baseline comfort level  Outcome: Progressing  Flowsheets  Taken 7/11/2023 1629 by Kaelyn Botello RN  Verbalizes/displays adequate comfort level or baseline comfort level:   Encourage patient to monitor pain and request assistance   Assess pain using appropriate pain scale   Administer analgesics based on type and severity of pain and evaluate response   Implement non-pharmacological measures as appropriate and evaluate response  Taken 7/11/2023 1123 by Darvin Hill

## 2023-07-11 NOTE — DISCHARGE SUMMARY
POCPH 7.404 07/09/2023 09:23 AM    POCPCO2 59.3 07/09/2023 09:23 AM    POCPO2 82.6 07/09/2023 09:23 AM    POCHCO3 37.1 07/09/2023 09:23 AM    PBEA 10 07/09/2023 09:23 AM    QYPT7PGS 96 07/09/2023 09:23 AM    FIO2 32.0 07/09/2023 09:23 AM     Lab Results   Component Value Date/Time    SPECIAL 8CC FOREARM LEFT 07/07/2023 05:40 PM     Lab Results   Component Value Date/Time    CULTURE NO GROWTH 3 DAYS 07/07/2023 05:40 PM       Radiology:  CT HEAD WO CONTRAST    Result Date: 7/7/2023  No acute intracranial abnormality. US RENAL COMPLETE    Addendum Date: 7/7/2023    ADDENDUM: Per clinical history, the patient does not have a Alexandra catheter. No Alexandra catheter is identified on the images. Please disregard Alexandra catheter is present comment as this was based off the technologist notes. In the initial report     Result Date: 7/7/2023  1. Moderate to severe right-sided hydronephrosis. 2. Nonspecific irregularity of the posterior urinary bladder wall. Alexandra catheter is present. Distention of the urinary bladder. 3. Nonvisualization of the left kidney due to bowel gas and rib shadowing. The findings were sent to the Radiology Results [employer] at 12:16 pm on 7/7/2023 to be communicated to a licensed caregiver. XR CHEST PORTABLE    Result Date: 7/5/2023  Cardiomegaly with central pulmonary vascular congestion. Central interstitial prominence bilaterally may reflect mild interstitial edema or atypical infection. No focal consolidation. CT CHEST ABDOMEN PELVIS WO CONTRAST Additional Contrast? None    Result Date: 7/7/2023  1. Severe scoliotic deformity in the lumbar area and moderate scoliotic deformity in the chest. 2. Eventration of the left hemidiaphragm and atelectatic changes and loss of volume in the left lower lobe. Atelectatic changes seen in the right lower lobe as well. No definitive pneumonic infiltrates.  3. Tiny pulmonary nodules seen in both upper lobes ranging between 1-3 mm. 4. In the

## 2023-07-11 NOTE — CASE COMMUNICATION
Discharge planning    Patient qualified for home 02. Sent new rx , face to face and RT testing to [facility]> call to valentin and confirmed that patient rx is for HS only. She will watch for order. Given nurses station number to call for ETA once gets ordered.      [address] [facility] rep [phone] until [address] [employer] phone [phone]

## 2023-07-11 NOTE — FLOWSHEET NOTE
Home Oxygen Evaluation    Home Oxygen Evaluation completed. Patient is on 3 liters per minute via 96. Resting SpO2 on room air = 87      Nocturnal Oximetry with patient on room air is recommended is SpO2 is between 89% and 95% (requires additional order).     Dennis Farooq RCP  1:55 PM

## 2023-07-11 NOTE — CARE COORDINATION
Discharge planning    Patient chart reviewed. Patient lives with spouse. She is non ambulatory and uses electric scooter and has a freda steady at home. Spouse assists with all Adls. She wears 3 L 02 at night only thru [facility]. She has been on 3 L 24/7 here and will need home 02 evaluation. She has had home care in past thru [facility]. Admitted with fluid overload, metabolic encephalopathy, UTI and tameka. Nephrology following notes ok to dc if creatine < 2 today. Creatine is 1.9 today. Cardiology notes ok to dc also. Can follow up for ROSANA in future. Patient has schultz and urology notes to do a VT once diuresing is completed. Need to follow up on home 02 eval, schultz cath and if interested in any home care. [address] with patient to discuss home care and home 02. Explained will be testing to see if needs more than at night. Also discussed home care. She is agreeable to home care and freedom of choice given. Referral to andrés. Call to RT to request 02 testing be done. 1200  Message that [facility] accepted patient.  Updated leighton

## 2023-07-12 LAB
MICROORGANISM SPEC CULT: NORMAL
MICROORGANISM SPEC CULT: NORMAL
SERVICE CMNT-IMP: NORMAL
SERVICE CMNT-IMP: NORMAL
SPECIMEN DESCRIPTION: NORMAL
SPECIMEN DESCRIPTION: NORMAL

## 2023-07-16 ENCOUNTER — APPOINTMENT (OUTPATIENT)
Dept: CT IMAGING | Age: 68
DRG: 638 | End: 2023-07-16
Payer: MEDICARE

## 2023-07-16 ENCOUNTER — HOSPITAL ENCOUNTER (INPATIENT)
Age: 68
LOS: 1 days | Discharge: HOME HEALTH | DRG: 638 | End: 2023-07-18
Attending: EMERGENCY MEDICINE | Admitting: STUDENT IN AN ORGANIZED HEALTH CARE EDUCATION/TRAINING PROGRAM
Payer: MEDICARE

## 2023-07-16 DIAGNOSIS — E16.2 HYPOGLYCEMIA: Primary | ICD-10-CM

## 2023-07-16 LAB
ALBUMIN SERPL-MCNC: 3.5 G/DL (ref 3.5–5.2)
ALBUMIN/GLOB SERPL: 0.9 {RATIO} (ref 1–2.5)
ALP SERPL-CCNC: 83 U/L (ref 35–104)
ALT SERPL-CCNC: 13 U/L (ref 5–33)
ANION GAP SERPL CALCULATED.3IONS-SCNC: 8 MMOL/L (ref 9–17)
AST SERPL-CCNC: 20 U/L
BASOPHILS # BLD: 0 K/UL (ref 0–0.2)
BASOPHILS NFR BLD: 1 % (ref 0–2)
BILIRUB SERPL-MCNC: 0.2 MG/DL (ref 0.3–1.2)
BUN SERPL-MCNC: 45 MG/DL (ref 8–23)
CALCIUM SERPL-MCNC: 9.4 MG/DL (ref 8.6–10.4)
CHLORIDE SERPL-SCNC: 91 MMOL/L (ref 98–107)
CO2 SERPL-SCNC: 32 MMOL/L (ref 20–31)
CREAT SERPL-MCNC: 1.4 MG/DL (ref 0.5–0.9)
EOSINOPHIL # BLD: 0.1 K/UL (ref 0–0.4)
EOSINOPHILS RELATIVE PERCENT: 3 % (ref 1–4)
ERYTHROCYTE [DISTWIDTH] IN BLOOD BY AUTOMATED COUNT: 14 % (ref 12.5–15.4)
GFR SERPL CREATININE-BSD FRML MDRD: 40 ML/MIN/1.73M2
GLUCOSE SERPL-MCNC: 76 MG/DL (ref 70–99)
HCT VFR BLD AUTO: 28.6 % (ref 36–46)
HGB BLD-MCNC: 9.1 G/DL (ref 12–16)
INR PPP: 3.5
LYMPHOCYTES # BLD: 20 % (ref 24–44)
LYMPHOCYTES NFR BLD: 1 K/UL (ref 1–4.8)
MCH RBC QN AUTO: 32.3 PG (ref 26–34)
MCHC RBC AUTO-ENTMCNC: 32 G/DL (ref 31–37)
MCV RBC AUTO: 100.8 FL (ref 80–100)
METER GLUCOSE: 102 MG/DL (ref 65–105)
METER GLUCOSE: 127 MG/DL (ref 65–105)
METER GLUCOSE: 52 MG/DL (ref 65–105)
METER GLUCOSE: 65 MG/DL (ref 65–105)
METER GLUCOSE: 73 MG/DL (ref 65–105)
MONOCYTES NFR BLD: 0.2 K/UL (ref 0.1–1.2)
MONOCYTES NFR BLD: 4 % (ref 2–11)
NEUTROPHILS NFR BLD: 72 % (ref 36–66)
NEUTS SEG NFR BLD: 3.5 K/UL (ref 1.8–7.7)
PLATELET # BLD AUTO: 182 K/UL (ref 140–450)
PMV BLD AUTO: 9.8 FL (ref 6–12)
POTASSIUM SERPL-SCNC: 4.1 MMOL/L (ref 3.7–5.3)
PROT SERPL-MCNC: 7.5 G/DL (ref 6.4–8.3)
PROTHROMBIN TIME: 35.7 SEC (ref 9.4–12.6)
RBC # BLD AUTO: 2.84 M/UL (ref 4–5.2)
SODIUM SERPL-SCNC: 131 MMOL/L (ref 135–144)
TROPONIN I SERPL HS-MCNC: 44 NG/L (ref 0–14)
TROPONIN I SERPL HS-MCNC: 49 NG/L (ref 0–14)
WBC OTHER # BLD: 4.8 K/UL (ref 3.5–11)

## 2023-07-16 PROCEDURE — 85610 PROTHROMBIN TIME: CPT

## 2023-07-16 PROCEDURE — 2580000003 HC RX 258: Performed by: NURSE PRACTITIONER

## 2023-07-16 PROCEDURE — 80053 COMPREHEN METABOLIC PANEL: CPT

## 2023-07-16 PROCEDURE — 99222 1ST HOSP IP/OBS MODERATE 55: CPT | Performed by: NURSE PRACTITIONER

## 2023-07-16 PROCEDURE — 99285 EMERGENCY DEPT VISIT HI MDM: CPT

## 2023-07-16 PROCEDURE — 2500000003 HC RX 250 WO HCPCS: Performed by: NURSE PRACTITIONER

## 2023-07-16 PROCEDURE — 85027 COMPLETE CBC AUTOMATED: CPT

## 2023-07-16 PROCEDURE — 84484 ASSAY OF TROPONIN QUANT: CPT

## 2023-07-16 PROCEDURE — 96374 THER/PROPH/DIAG INJ IV PUSH: CPT

## 2023-07-16 PROCEDURE — 82947 ASSAY GLUCOSE BLOOD QUANT: CPT

## 2023-07-16 PROCEDURE — 6370000000 HC RX 637 (ALT 250 FOR IP): Performed by: NURSE PRACTITIONER

## 2023-07-16 PROCEDURE — 36415 COLL VENOUS BLD VENIPUNCTURE: CPT

## 2023-07-16 PROCEDURE — G0378 HOSPITAL OBSERVATION PER HR: HCPCS

## 2023-07-16 RX ORDER — DEXTROSE MONOHYDRATE 25 G/50ML
12.5 INJECTION, SOLUTION INTRAVENOUS ONCE
Status: COMPLETED | OUTPATIENT
Start: 2023-07-16 | End: 2023-07-16

## 2023-07-16 RX ORDER — TORSEMIDE 20 MG/1
20 TABLET ORAL DAILY
Status: DISCONTINUED | OUTPATIENT
Start: 2023-07-17 | End: 2023-07-18 | Stop reason: HOSPADM

## 2023-07-16 RX ORDER — ACETAMINOPHEN 325 MG/1
650 TABLET ORAL EVERY 6 HOURS PRN
Status: DISCONTINUED | OUTPATIENT
Start: 2023-07-16 | End: 2023-07-18 | Stop reason: HOSPADM

## 2023-07-16 RX ORDER — ASPIRIN 81 MG/1
81 TABLET ORAL DAILY
Status: DISCONTINUED | OUTPATIENT
Start: 2023-07-17 | End: 2023-07-18 | Stop reason: HOSPADM

## 2023-07-16 RX ORDER — METOPROLOL SUCCINATE 25 MG/1
25 TABLET, EXTENDED RELEASE ORAL DAILY
Status: DISCONTINUED | OUTPATIENT
Start: 2023-07-17 | End: 2023-07-18 | Stop reason: HOSPADM

## 2023-07-16 RX ORDER — GLUCAGON 1 MG/ML
1 KIT INJECTION PRN
Status: DISCONTINUED | OUTPATIENT
Start: 2023-07-16 | End: 2023-07-18 | Stop reason: HOSPADM

## 2023-07-16 RX ORDER — HYDROCODONE BITARTRATE AND ACETAMINOPHEN 5; 325 MG/1; MG/1
1 TABLET ORAL EVERY 6 HOURS PRN
Status: DISCONTINUED | OUTPATIENT
Start: 2023-07-16 | End: 2023-07-18 | Stop reason: HOSPADM

## 2023-07-16 RX ORDER — ACETAMINOPHEN 650 MG/1
650 SUPPOSITORY RECTAL EVERY 6 HOURS PRN
Status: DISCONTINUED | OUTPATIENT
Start: 2023-07-16 | End: 2023-07-18 | Stop reason: HOSPADM

## 2023-07-16 RX ORDER — SODIUM CHLORIDE 0.9 % (FLUSH) 0.9 %
10 SYRINGE (ML) INJECTION PRN
Status: DISCONTINUED | OUTPATIENT
Start: 2023-07-16 | End: 2023-07-18 | Stop reason: HOSPADM

## 2023-07-16 RX ORDER — 0.9 % SODIUM CHLORIDE 0.9 %
1000 INTRAVENOUS SOLUTION INTRAVENOUS ONCE
Status: DISCONTINUED | OUTPATIENT
Start: 2023-07-16 | End: 2023-07-18 | Stop reason: HOSPADM

## 2023-07-16 RX ORDER — POTASSIUM CHLORIDE 20 MEQ/1
40 TABLET, EXTENDED RELEASE ORAL PRN
Status: DISCONTINUED | OUTPATIENT
Start: 2023-07-16 | End: 2023-07-18 | Stop reason: HOSPADM

## 2023-07-16 RX ORDER — MAGNESIUM SULFATE 1 G/100ML
1000 INJECTION INTRAVENOUS PRN
Status: DISCONTINUED | OUTPATIENT
Start: 2023-07-16 | End: 2023-07-18 | Stop reason: HOSPADM

## 2023-07-16 RX ORDER — ATORVASTATIN CALCIUM 40 MG/1
40 TABLET, FILM COATED ORAL DAILY
Status: DISCONTINUED | OUTPATIENT
Start: 2023-07-17 | End: 2023-07-18 | Stop reason: HOSPADM

## 2023-07-16 RX ORDER — DEXTROSE MONOHYDRATE 100 MG/ML
INJECTION, SOLUTION INTRAVENOUS CONTINUOUS PRN
Status: DISCONTINUED | OUTPATIENT
Start: 2023-07-16 | End: 2023-07-18 | Stop reason: HOSPADM

## 2023-07-16 RX ORDER — ENOXAPARIN SODIUM 100 MG/ML
40 INJECTION SUBCUTANEOUS DAILY
Status: DISCONTINUED | OUTPATIENT
Start: 2023-07-17 | End: 2023-07-18 | Stop reason: HOSPADM

## 2023-07-16 RX ORDER — ONDANSETRON 4 MG/1
4 TABLET, ORALLY DISINTEGRATING ORAL EVERY 8 HOURS PRN
Status: DISCONTINUED | OUTPATIENT
Start: 2023-07-16 | End: 2023-07-18 | Stop reason: HOSPADM

## 2023-07-16 RX ORDER — ONDANSETRON 2 MG/ML
4 INJECTION INTRAMUSCULAR; INTRAVENOUS EVERY 6 HOURS PRN
Status: DISCONTINUED | OUTPATIENT
Start: 2023-07-16 | End: 2023-07-18 | Stop reason: HOSPADM

## 2023-07-16 RX ORDER — POLYETHYLENE GLYCOL 3350 17 G/17G
17 POWDER, FOR SOLUTION ORAL DAILY PRN
Status: DISCONTINUED | OUTPATIENT
Start: 2023-07-16 | End: 2023-07-18 | Stop reason: HOSPADM

## 2023-07-16 RX ORDER — SODIUM CHLORIDE 9 MG/ML
INJECTION, SOLUTION INTRAVENOUS PRN
Status: DISCONTINUED | OUTPATIENT
Start: 2023-07-16 | End: 2023-07-18 | Stop reason: HOSPADM

## 2023-07-16 RX ORDER — POTASSIUM CHLORIDE 7.45 MG/ML
10 INJECTION INTRAVENOUS PRN
Status: DISCONTINUED | OUTPATIENT
Start: 2023-07-16 | End: 2023-07-18 | Stop reason: HOSPADM

## 2023-07-16 RX ORDER — SODIUM CHLORIDE 0.9 % (FLUSH) 0.9 %
5-40 SYRINGE (ML) INJECTION EVERY 12 HOURS SCHEDULED
Status: DISCONTINUED | OUTPATIENT
Start: 2023-07-16 | End: 2023-07-18 | Stop reason: HOSPADM

## 2023-07-16 RX ORDER — GABAPENTIN 300 MG/1
300 CAPSULE ORAL 3 TIMES DAILY
Status: DISCONTINUED | OUTPATIENT
Start: 2023-07-16 | End: 2023-07-18 | Stop reason: HOSPADM

## 2023-07-16 RX ADMIN — GABAPENTIN 300 MG: 300 CAPSULE ORAL at 22:22

## 2023-07-16 RX ADMIN — DEXTROSE MONOHYDRATE 12.5 G: 500 INJECTION PARENTERAL at 16:51

## 2023-07-16 RX ADMIN — SODIUM CHLORIDE, PRESERVATIVE FREE 10 ML: 5 INJECTION INTRAVENOUS at 22:22

## 2023-07-16 ASSESSMENT — ENCOUNTER SYMPTOMS
WHEEZING: 0
ABDOMINAL PAIN: 0
SHORTNESS OF BREATH: 0
SINUS PAIN: 0
COUGH: 0
DIARRHEA: 0
SINUS PRESSURE: 0
PHOTOPHOBIA: 0
CONSTIPATION: 0
NAUSEA: 0
VOMITING: 0

## 2023-07-16 ASSESSMENT — PAIN - FUNCTIONAL ASSESSMENT: PAIN_FUNCTIONAL_ASSESSMENT: NONE - DENIES PAIN

## 2023-07-17 PROBLEM — N30.00 ACUTE CYSTITIS WITHOUT HEMATURIA: Status: ACTIVE | Noted: 2023-07-17

## 2023-07-17 LAB
ANION GAP SERPL CALCULATED.3IONS-SCNC: 9 MMOL/L (ref 9–17)
BACTERIA URNS QL MICRO: ABNORMAL
BASOPHILS # BLD: 0.1 K/UL (ref 0–0.2)
BASOPHILS NFR BLD: 1 % (ref 0–2)
BILIRUB UR QL STRIP: NEGATIVE
BUN SERPL-MCNC: 42 MG/DL (ref 8–23)
CALCIUM SERPL-MCNC: 9.4 MG/DL (ref 8.6–10.4)
CASTS #/AREA URNS LPF: ABNORMAL /LPF
CASTS #/AREA URNS LPF: ABNORMAL /LPF
CHARACTER UR: ABNORMAL
CHLORIDE SERPL-SCNC: 92 MMOL/L (ref 98–107)
CLARITY UR: CLEAR
CO2 SERPL-SCNC: 33 MMOL/L (ref 20–31)
COLOR UR: YELLOW
CREAT SERPL-MCNC: 1.3 MG/DL (ref 0.5–0.9)
EKG ATRIAL RATE: 70 BPM
EKG P AXIS: 65 DEGREES
EKG P-R INTERVAL: 194 MS
EKG Q-T INTERVAL: 428 MS
EKG QRS DURATION: 98 MS
EKG QTC CALCULATION (BAZETT): 462 MS
EKG R AXIS: 59 DEGREES
EKG T AXIS: 71 DEGREES
EKG VENTRICULAR RATE: 70 BPM
EOSINOPHIL # BLD: 0.2 K/UL (ref 0–0.4)
EOSINOPHILS RELATIVE PERCENT: 3 % (ref 1–4)
EPI CELLS #/AREA URNS HPF: ABNORMAL /HPF (ref 0–5)
ERYTHROCYTE [DISTWIDTH] IN BLOOD BY AUTOMATED COUNT: 14.3 % (ref 12.5–15.4)
GFR SERPL CREATININE-BSD FRML MDRD: 44 ML/MIN/1.73M2
GLUCOSE SERPL-MCNC: 56 MG/DL (ref 70–99)
GLUCOSE UR STRIP-MCNC: ABNORMAL MG/DL
HCT VFR BLD AUTO: 29.3 % (ref 36–46)
HGB BLD-MCNC: 9.5 G/DL (ref 12–16)
HGB UR QL STRIP.AUTO: ABNORMAL
KETONES UR STRIP-MCNC: NEGATIVE MG/DL
LEUKOCYTE ESTERASE UR QL STRIP: ABNORMAL
LYMPHOCYTES # BLD: 17 % (ref 24–44)
LYMPHOCYTES NFR BLD: 1.1 K/UL (ref 1–4.8)
MCH RBC QN AUTO: 32.5 PG (ref 26–34)
MCHC RBC AUTO-ENTMCNC: 32.3 G/DL (ref 31–37)
MCV RBC AUTO: 100.5 FL (ref 80–100)
METER GLUCOSE: 107 MG/DL (ref 65–105)
METER GLUCOSE: 159 MG/DL (ref 65–105)
METER GLUCOSE: 174 MG/DL (ref 65–105)
METER GLUCOSE: 197 MG/DL (ref 65–105)
METER GLUCOSE: 205 MG/DL (ref 65–105)
METER GLUCOSE: 213 MG/DL (ref 65–105)
MONOCYTES NFR BLD: 0.4 K/UL (ref 0.1–1.2)
MONOCYTES NFR BLD: 6 % (ref 2–11)
NEUTROPHILS NFR BLD: 73 % (ref 36–66)
NEUTS SEG NFR BLD: 5 K/UL (ref 1.8–7.7)
NITRITE UR QL STRIP: NEGATIVE
PH UR STRIP: 6 [PH] (ref 5–8)
PLATELET # BLD AUTO: 319 K/UL (ref 140–450)
PMV BLD AUTO: 7.6 FL (ref 6–12)
POTASSIUM SERPL-SCNC: 4.6 MMOL/L (ref 3.7–5.3)
PROT UR STRIP-MCNC: ABNORMAL MG/DL
RBC # BLD AUTO: 2.91 M/UL (ref 4–5.2)
RBC #/AREA URNS HPF: ABNORMAL /HPF (ref 0–2)
SODIUM SERPL-SCNC: 134 MMOL/L (ref 135–144)
SP GR UR STRIP: 1.01 (ref 1–1.03)
UROBILINOGEN UR STRIP-ACNC: NORMAL EU/DL (ref 0–1)
WBC #/AREA URNS HPF: ABNORMAL /HPF (ref 0–5)
WBC OTHER # BLD: 6.8 K/UL (ref 3.5–11)

## 2023-07-17 PROCEDURE — G0378 HOSPITAL OBSERVATION PER HR: HCPCS

## 2023-07-17 PROCEDURE — 85027 COMPLETE CBC AUTOMATED: CPT

## 2023-07-17 PROCEDURE — 87077 CULTURE AEROBIC IDENTIFY: CPT

## 2023-07-17 PROCEDURE — 6360000002 HC RX W HCPCS: Performed by: NURSE PRACTITIONER

## 2023-07-17 PROCEDURE — 87086 URINE CULTURE/COLONY COUNT: CPT

## 2023-07-17 PROCEDURE — 2580000003 HC RX 258: Performed by: NURSE PRACTITIONER

## 2023-07-17 PROCEDURE — 81001 URINALYSIS AUTO W/SCOPE: CPT

## 2023-07-17 PROCEDURE — 82947 ASSAY GLUCOSE BLOOD QUANT: CPT

## 2023-07-17 PROCEDURE — 6360000002 HC RX W HCPCS: Performed by: STUDENT IN AN ORGANIZED HEALTH CARE EDUCATION/TRAINING PROGRAM

## 2023-07-17 PROCEDURE — 2580000003 HC RX 258: Performed by: STUDENT IN AN ORGANIZED HEALTH CARE EDUCATION/TRAINING PROGRAM

## 2023-07-17 PROCEDURE — 80048 BASIC METABOLIC PNL TOTAL CA: CPT

## 2023-07-17 PROCEDURE — 6370000000 HC RX 637 (ALT 250 FOR IP): Performed by: NURSE PRACTITIONER

## 2023-07-17 PROCEDURE — 87186 SC STD MICRODIL/AGAR DIL: CPT

## 2023-07-17 PROCEDURE — 99231 SBSQ HOSP IP/OBS SF/LOW 25: CPT | Performed by: STUDENT IN AN ORGANIZED HEALTH CARE EDUCATION/TRAINING PROGRAM

## 2023-07-17 PROCEDURE — 36415 COLL VENOUS BLD VENIPUNCTURE: CPT

## 2023-07-17 RX ORDER — INSULIN LISPRO 100 [IU]/ML
0-4 INJECTION, SOLUTION INTRAVENOUS; SUBCUTANEOUS NIGHTLY
Status: DISCONTINUED | OUTPATIENT
Start: 2023-07-17 | End: 2023-07-18 | Stop reason: HOSPADM

## 2023-07-17 RX ORDER — INSULIN LISPRO 100 [IU]/ML
0-4 INJECTION, SOLUTION INTRAVENOUS; SUBCUTANEOUS
Status: DISCONTINUED | OUTPATIENT
Start: 2023-07-17 | End: 2023-07-18 | Stop reason: HOSPADM

## 2023-07-17 RX ORDER — DEXTROSE AND SODIUM CHLORIDE 5; .9 G/100ML; G/100ML
INJECTION, SOLUTION INTRAVENOUS CONTINUOUS
Status: DISCONTINUED | OUTPATIENT
Start: 2023-07-17 | End: 2023-07-17

## 2023-07-17 RX ADMIN — SODIUM CHLORIDE, PRESERVATIVE FREE 10 ML: 5 INJECTION INTRAVENOUS at 08:23

## 2023-07-17 RX ADMIN — GABAPENTIN 300 MG: 300 CAPSULE ORAL at 21:28

## 2023-07-17 RX ADMIN — METOPROLOL SUCCINATE 25 MG: 25 TABLET, EXTENDED RELEASE ORAL at 08:23

## 2023-07-17 RX ADMIN — SODIUM CHLORIDE, PRESERVATIVE FREE 10 ML: 5 INJECTION INTRAVENOUS at 21:27

## 2023-07-17 RX ADMIN — CEFTRIAXONE SODIUM 1000 MG: 1 INJECTION, POWDER, FOR SOLUTION INTRAMUSCULAR; INTRAVENOUS at 13:37

## 2023-07-17 RX ADMIN — ATORVASTATIN CALCIUM 40 MG: 40 TABLET, FILM COATED ORAL at 08:23

## 2023-07-17 RX ADMIN — TORSEMIDE 20 MG: 20 TABLET ORAL at 08:23

## 2023-07-17 RX ADMIN — DEXTROSE AND SODIUM CHLORIDE: 5; 900 INJECTION, SOLUTION INTRAVENOUS at 08:22

## 2023-07-17 RX ADMIN — ENOXAPARIN SODIUM 40 MG: 100 INJECTION SUBCUTANEOUS at 08:23

## 2023-07-17 RX ADMIN — HYDROCODONE BITARTRATE AND ACETAMINOPHEN 1 TABLET: 5; 325 TABLET ORAL at 05:26

## 2023-07-17 RX ADMIN — GABAPENTIN 300 MG: 300 CAPSULE ORAL at 13:34

## 2023-07-17 RX ADMIN — HYDROCODONE BITARTRATE AND ACETAMINOPHEN 1 TABLET: 5; 325 TABLET ORAL at 11:15

## 2023-07-17 RX ADMIN — GABAPENTIN 300 MG: 300 CAPSULE ORAL at 08:23

## 2023-07-17 RX ADMIN — ASPIRIN 81 MG: 81 TABLET, COATED ORAL at 08:23

## 2023-07-17 RX ADMIN — POLYETHYLENE GLYCOL 3350 17 G: 17 POWDER, FOR SOLUTION ORAL at 11:20

## 2023-07-17 RX ADMIN — HYDROCODONE BITARTRATE AND ACETAMINOPHEN 1 TABLET: 5; 325 TABLET ORAL at 21:27

## 2023-07-17 ASSESSMENT — PAIN SCALES - GENERAL
PAINLEVEL_OUTOF10: 6
PAINLEVEL_OUTOF10: 3
PAINLEVEL_OUTOF10: 7
PAINLEVEL_OUTOF10: 7

## 2023-07-17 ASSESSMENT — PAIN DESCRIPTION - DESCRIPTORS
DESCRIPTORS: ACHING;THROBBING
DESCRIPTORS: ACHING

## 2023-07-17 ASSESSMENT — PAIN DESCRIPTION - ORIENTATION: ORIENTATION: RIGHT;LEFT;MID

## 2023-07-17 ASSESSMENT — PAIN DESCRIPTION - LOCATION
LOCATION: BACK
LOCATION: BACK

## 2023-07-17 NOTE — H&P
[facility]  Office: [address]26, DO, Sebastian Le, DO, Arabellairis Auguste, DO, Yamel Meade Blood, DO, Fabiano Kuhn MD, Jesus Ingram MD, Kailyn Carty MD, Russel Fitzgerald MD,  Lis Yang MD, Lilly Zepeda MD, Selvin Cat, DO, Tonia Hidalgo MD,  Andrew Ramos MD, Obdulia Gudino MD, Dominick Miller, DO, Charisma Best MD,  Silas Riedel, DO, Roshni Felton MD, Isaiah Burns MD, Lindsey Funk MD, Rosemarie Quintanilla MD,  Juan Le MD, Delia Palma MD, Rj Kaufman DO, Karyn Drummond MD,  Tara Dorsey MD, Maria T Ty, CNP,  Isrrael Rose, CNP, Farzana Anthony, CNP, Ken Beckham, CNP,  Lorna Pereira, DNP, Minoo Larson, CNP, Mack Chase, CNP, Severiano Schilder, CNP, Dee Tran, CNP, Hulan Skiff, CNP, Becca Rodriguez PAGuanakitoC, Zora Britton, CNS, Abraham Ballesteros, CNP, Chely Alcantar, CNP         [facility]    HISTORY AND PHYSICAL EXAMINATION            Date:   7/16/2023  Patient name:  Troy Flowers  Date of admission:  7/16/2023  4:21 PM  MRN:   [id number]  Account:  [de-identified]  YOB: 1955  PCP:    Prince Miles  Room:   ANNA/ANNA  Code Status:    Prior    Chief Complaint:     Chief Complaint   Patient presents with    Hypoglycemia       History Obtained From:     patient    History of Present Illness:     Troy Flowers is a 77 y.o. Unavailable / unknown female who presents with Hypoglycemia   and is admitted to the hospital for the management of Hypoglycemia. Patient was seen and evaluated at our facility for acute pulmonary edema secondary to heart failure. She was discharged at that time on a new diuretic regimen. In addition to her heart failure regimen she was initiated on glipizide with her previously scheduled metformin. Patient was also started on Jardiance by her primary care provider shortly before this admission.   Patient filled both of these

## 2023-07-17 NOTE — CARE COORDINATION
07/17/23 0830   Readmission Assessment   Number of Days since last admission? 1-7 days   Previous Disposition Home with Home Health   Who is being Interviewed Patient   What was the patient's/caregiver's perception as to why they think they needed to return back to the hospital? Other (Comment)  (low blood sugar)   Did you visit your Primary Care Physician after you left the hospital, before you returned this time? No   Why weren't you able to visit your PCP? Did not have an appointment   Did you see a specialist, such as Cardiac, Pulmonary, Orthopedic Physician, etc. after you left the hospital? No   Who advised the patient to return to the hospital? Self-referral;Caregiver   Does the patient report anything that got in the way of taking their medications? No   In our efforts to provide the best possible care to you and others like you, can you think of anything that we could have done to help you after you left the hospital the first time, so that you might not have needed to return so soon? Discharge instructions that are concise, clear, and non contradictory; Improved written discharge instructions

## 2023-07-17 NOTE — ACP (ADVANCE CARE PLANNING)
Advance Care Planning     Advance Care Planning Activator (Inpatient)  Conversation Note      Date of ACP Conversation: 7/17/2023     Conversation Conducted with: Patient with Decision Making Capacity    ACP Activator: Mouna Ratliff RN          Health Care Decision Maker:     Current Designated Health Care Decision Maker:     Primary Decision Maker: Suni Alvarado - [phone]  Click here to complete Healthcare Decision Makers including section of the Healthcare Decision Maker Relationship (ie \"Primary\")  Today we documented Decision Maker(s) consistent with Legal Next of Kin hierarchy. Care Preferences    Ventilation: \"If you were in your present state of health and suddenly became very ill and were unable to breathe on your own, what would your preference be about the use of a ventilator (breathing machine) if it were available to you? \"      Would the patient desire the use of ventilator (breathing machine)?: yes    \"If your health worsens and it becomes clear that your chance of recovery is unlikely, what would your preference be about the use of a ventilator (breathing machine) if it were available to you? \"     Would the patient desire the use of ventilator (breathing machine)?: No      Resuscitation  \"CPR works best to restart the heart when there is a sudden event, like a heart attack, in someone who is otherwise healthy. Unfortunately, CPR does not typically restart the heart for people who have serious health conditions or who are very sick. \"    \"In the event your heart stopped as a result of an underlying serious health condition, would you want attempts to be made to restart your heart (answer \"yes\" for attempt to resuscitate) or would you prefer a natural death (answer \"no\" for do not attempt to resuscitate)? \" yes       [] Yes   [] No   Educated Patient / Marene Ducking regarding differences between Advance Directives and portable DNR orders.     Length of ACP Conversation in minutes:

## 2023-07-17 NOTE — CARE COORDINATION
Case Management Assessment  Initial Evaluation    Date/Time of Evaluation: 7/17/2023 4:23 PM  Assessment Completed by: Jacqueline Alvarado RN    If patient is discharged prior to next notation, then this note serves as note for discharge by case management. Patient Name: Flaquita Kingston                   YOB: 1955  Diagnosis: Hypoglycemia [E16.2]  Acute cystitis without hematuria [N30.00]                   Date / Time: 7/16/2023  4:21 PM    Patient Admission Status: Inpatient   Readmission Risk (Low < 19, Mod (19-27), High > 27): Readmission Risk Score: 19.2    Current PCP: Jes Gambino  PCP verified by CM? Yes    Chart Reviewed: Yes      History Provided by: Patient  Patient Orientation: Alert and Oriented    Patient Cognition: Alert    Hospitalization in the last 30 days (Readmission):  Yes    If yes, Readmission Assessment in CM Navigator will be completed. Advance Directives:      Code Status: Full Code   Patient's Primary Decision Maker is: Legal Next of Kin    Primary Decision MakerHgabriel Vieyra Spouse - [phone]    Discharge Planning:    Patient lives with: Spouse/Significant Other Type of Home: House  Primary Care Giver: Self  Patient Support Systems include: Spouse/Significant Other   Current Financial resources: None  Current community resources: None  Current services prior to admission: Oxygen Therapy, Home Care            Current DME: [facility], Wheelchair, Shower Chair, Oxygen Therapy (Comment), Home Aerosol (electric scooter)            Type of Home Care services:  PT, OT, Nursing Services    ADLS  Prior functional level: Assistance with the following:  Current functional level: Assistance with the following:    PT AM-PAC:   /24  OT AM-PAC:   /24    Family can provide assistance at DC: Yes  Would you like Case Management to discuss the discharge plan with any other family members/significant others, and if so, who?  Yes  Plans to Return to Present Housing: Yes  Other

## 2023-07-18 VITALS
BODY MASS INDEX: 36 KG/M2 | DIASTOLIC BLOOD PRESSURE: 50 MMHG | SYSTOLIC BLOOD PRESSURE: 118 MMHG | RESPIRATION RATE: 16 BRPM | OXYGEN SATURATION: 100 % | HEART RATE: 85 BPM | TEMPERATURE: 98.3 F | HEIGHT: 65 IN | WEIGHT: 216.05 LBS

## 2023-07-18 LAB
METER GLUCOSE: 162 MG/DL (ref 65–105)
METER GLUCOSE: 258 MG/DL (ref 65–105)
MICROORGANISM SPEC CULT: ABNORMAL
SPECIMEN DESCRIPTION: ABNORMAL

## 2023-07-18 PROCEDURE — 99238 HOSP IP/OBS DSCHRG MGMT 30/<: CPT | Performed by: HOSPITALIST

## 2023-07-18 PROCEDURE — 82947 ASSAY GLUCOSE BLOOD QUANT: CPT

## 2023-07-18 PROCEDURE — 6360000002 HC RX W HCPCS: Performed by: NURSE PRACTITIONER

## 2023-07-18 PROCEDURE — 6370000000 HC RX 637 (ALT 250 FOR IP): Performed by: NURSE PRACTITIONER

## 2023-07-18 PROCEDURE — 6370000000 HC RX 637 (ALT 250 FOR IP): Performed by: STUDENT IN AN ORGANIZED HEALTH CARE EDUCATION/TRAINING PROGRAM

## 2023-07-18 RX ORDER — CEPHALEXIN 500 MG/1
500 CAPSULE ORAL 3 TIMES DAILY
Qty: 15 CAPSULE | Refills: 0 | Status: SHIPPED | OUTPATIENT
Start: 2023-07-18 | End: 2023-07-23

## 2023-07-18 RX ADMIN — HYDROCODONE BITARTRATE AND ACETAMINOPHEN 1 TABLET: 5; 325 TABLET ORAL at 14:09

## 2023-07-18 RX ADMIN — GABAPENTIN 300 MG: 300 CAPSULE ORAL at 08:56

## 2023-07-18 RX ADMIN — HYDROCODONE BITARTRATE AND ACETAMINOPHEN 1 TABLET: 5; 325 TABLET ORAL at 09:03

## 2023-07-18 RX ADMIN — GABAPENTIN 300 MG: 300 CAPSULE ORAL at 14:09

## 2023-07-18 RX ADMIN — ASPIRIN 81 MG: 81 TABLET, COATED ORAL at 08:56

## 2023-07-18 RX ADMIN — ATORVASTATIN CALCIUM 40 MG: 40 TABLET, FILM COATED ORAL at 08:56

## 2023-07-18 RX ADMIN — ONDANSETRON 4 MG: 4 TABLET, ORALLY DISINTEGRATING ORAL at 09:03

## 2023-07-18 RX ADMIN — INSULIN LISPRO 2 UNITS: 100 INJECTION, SOLUTION INTRAVENOUS; SUBCUTANEOUS at 11:39

## 2023-07-18 RX ADMIN — METOPROLOL SUCCINATE 25 MG: 25 TABLET, EXTENDED RELEASE ORAL at 08:56

## 2023-07-18 RX ADMIN — ENOXAPARIN SODIUM 40 MG: 100 INJECTION SUBCUTANEOUS at 08:56

## 2023-07-18 RX ADMIN — TORSEMIDE 20 MG: 20 TABLET ORAL at 08:56

## 2023-07-18 ASSESSMENT — PAIN SCALES - GENERAL
PAINLEVEL_OUTOF10: 7
PAINLEVEL_OUTOF10: 7
PAINLEVEL_OUTOF10: 3
PAINLEVEL_OUTOF10: 4
PAINLEVEL_OUTOF10: 7
PAINLEVEL_OUTOF10: 4
PAINLEVEL_OUTOF10: 3
PAINLEVEL_OUTOF10: 7

## 2023-07-18 ASSESSMENT — PAIN DESCRIPTION - LOCATION
LOCATION: BACK
LOCATION: BACK

## 2023-07-18 ASSESSMENT — PAIN DESCRIPTION - DESCRIPTORS
DESCRIPTORS: ACHING
DESCRIPTORS: ACHING

## 2023-07-18 NOTE — DISCHARGE SUMMARY
hypoglycemia. The patient sulfonylurea was discontinued and she was monitored. She was provided Jardiance and her blood sugars did stabilize. She has been instructed not to resume her glipizide and to continue Jardiance on discharge. The patient was found to have an incidental urinary tract infection and was treated with Rocephin while in the hospital and transition to Keflex on discharge. Sensitivity and identification of the pathogen was pending at the time of discharge and she is to follow-up as an outpatient. She is discharged in stable condition. Significant therapeutic interventions: As above    Significant Diagnostic Studies:   Labs:  Hematology:  Recent Labs     07/16/23 1634 07/17/23  0547   WBC 4.8 6.8   RBC 2.84* 2.91*   HGB 9.1* 9.5*   HCT 28.6* 29.3*   .8* 100.5*   MCH 32.3 32.5   MCHC 32.0 32.3   RDW 14.0 14.3    319   MPV 9.8 7.6   INR 3.5  --      Chemistry:  Recent Labs     07/16/23 1634 07/16/23 2019 07/17/23  0547   *  --  134*   K 4.1  --  4.6   CL 91*  --  92*   CO2 32*  --  33*   GLUCOSE 76  --  56*   BUN 45*  --  42*   CREATININE 1.4*  --  1.3*   ANIONGAP 8*  --  9   LABGLOM 40*  --  44*   CALCIUM 9.4  --  9.4   TROPHS 49* 44*  --      Recent Labs     07/16/23  1634   PROT 7.5   LABALBU 3.5   AST 20   ALT 13   ALKPHOS 83   BILITOT 0.2*     ABG:  Lab Results   Component Value Date/Time    POCPH 7.404 07/09/2023 09:23 AM    POCPCO2 59.3 07/09/2023 09:23 AM    POCPO2 82.6 07/09/2023 09:23 AM    POCHCO3 37.1 07/09/2023 09:23 AM    PBEA 10 07/09/2023 09:23 AM    EIRW3WEC 96 07/09/2023 09:23 AM    FIO2 32.0 07/09/2023 09:23 AM     Lab Results   Component Value Date/Time    SPECIAL 8CC FOREARM LEFT 07/07/2023 05:40 PM     Lab Results   Component Value Date/Time    CULTURE GRAM NEGATIVE RODS >100,000 CFU/ML (A) 07/17/2023 05:45 AM       Radiology:  No results found.     Consultations:    Consults:     Final Specialist Recommendations/Findings:   IP CONSULT TO HOME CARE

## 2023-07-18 NOTE — PLAN OF CARE
Problem: Respiratory - Adult  Goal: Achieves optimal ventilation and oxygenation  Outcome: Progressing  Flowsheets (Taken 7/18/2023 0103)  Achieves optimal ventilation and oxygenation:   Assess for changes in respiratory status   Assess for changes in mentation and behavior   Oxygen supplementation based on oxygen saturation or arterial blood gases   Assess and instruct to report shortness of breath or any respiratory difficulty   Respiratory therapy support as indicated

## 2023-07-18 NOTE — PLAN OF CARE
Problem: Discharge Planning  Goal: Discharge to home or other facility with appropriate resources  Outcome: Progressing   Patient actively participates in ADL's and decision making regarding plan of care - asking questions and expressing eagerness to discharge home tomorrow  Problem: Safety - Adult  Goal: Free from fall injury  Outcome: Progressing   No falls/injuries this shift, bed in lowest position, brakes on, bed alarm on, call light in reach, side rails up x2 - pt utilizes call light appropriately and will continue to monitor  Problem: Chronic Conditions and Co-morbidities  Goal: Patient's chronic conditions and co-morbidity symptoms are monitored and maintained or improved  Outcome: Progressing   Pts glucose levels are remaining stable for the shift, will continue to monitor  Problem: Skin/Tissue Integrity  Goal: Absence of new skin breakdown  Description: 1. Monitor for areas of redness and/or skin breakdown  2. Assess vascular access sites hourly  3. Every 4-6 hours minimum:  Change oxygen saturation probe site  4. Every 4-6 hours:  If on nasal continuous positive airway pressure, respiratory therapy assess nares and determine need for appliance change or resting period. Outcome: Progressing   Pt turns self and staff assists in turning every 2 hrs, pt remains free of new skin breakdown and will continue to assess.

## 2023-07-18 NOTE — PLAN OF CARE
Problem: Discharge Planning  Goal: Discharge to home or other facility with appropriate resources  7/18/2023 1207 by Jose Collins RN  Outcome: Completed  7/18/2023 0157 by Darvin Wilcox RN  Outcome: Progressing     Problem: Safety - Adult  Goal: Free from fall injury  7/18/2023 1207 by Jose Collins RN  Outcome: Completed  7/18/2023 0157 by Darvin Wilcox RN  Outcome: Progressing     Problem: Chronic Conditions and Co-morbidities  Goal: Patient's chronic conditions and co-morbidity symptoms are monitored and maintained or improved  7/18/2023 1207 by Jose Collins RN  Outcome: Completed  7/18/2023 0157 by Darvin Wilcox RN  Outcome: Progressing     Problem: Respiratory - Adult  Goal: Achieves optimal ventilation and oxygenation  7/18/2023 1207 by Jose Collins RN  Outcome: Completed  7/18/2023 0157 by Darvin Wilcox RN  Outcome: Progressing  7/18/2023 0103 by Francisco Griffith RCP  Outcome: Progressing  Flowsheets (Taken 7/18/2023 0103)  Achieves optimal ventilation and oxygenation:   Assess for changes in respiratory status   Assess for changes in mentation and behavior   Oxygen supplementation based on oxygen saturation or arterial blood gases   Assess and instruct to report shortness of breath or any respiratory difficulty   Respiratory therapy support as indicated     Problem: Skin/Tissue Integrity  Goal: Absence of new skin breakdown  Description: 1. Monitor for areas of redness and/or skin breakdown  2. Assess vascular access sites hourly  3. Every 4-6 hours minimum:  Change oxygen saturation probe site  4. Every 4-6 hours:  If on nasal continuous positive airway pressure, respiratory therapy assess nares and determine need for appliance change or resting period.   7/18/2023 1207 by Jose Collins RN  Outcome: Completed  7/18/2023 0157 by Darvin Wilcox RN  Outcome: Progressing     Problem: Pain  Goal: Verbalizes/displays adequate comfort level or baseline comfort level  Outcome: Completed

## 2023-07-18 NOTE — CARE COORDINATION
Discharge Planning  Confirmed with [facility] that they received the referral and can resume care. They will follow for OTTONIEL.

## 2023-07-25 ENCOUNTER — HOSPITAL ENCOUNTER (OUTPATIENT)
Age: 68
Setting detail: SPECIMEN
Discharge: HOME | End: 2023-07-25
Payer: MEDICARE

## 2023-07-25 LAB
ANION GAP SERPL CALCULATED.3IONS-SCNC: 12 MMOL/L (ref 9–17)
BUN SERPL-MCNC: 50 MG/DL (ref 8–23)
CALCIUM SERPL-MCNC: 9.4 MG/DL (ref 8.6–10.4)
CHLORIDE SERPL-SCNC: 90 MMOL/L (ref 98–107)
CO2 SERPL-SCNC: 34 MMOL/L (ref 20–31)
CREAT SERPL-MCNC: 1.7 MG/DL (ref 0.5–0.9)
GFR SERPL CREATININE-BSD FRML MDRD: 32 ML/MIN/1.73M2
GLUCOSE SERPL-MCNC: 164 MG/DL (ref 70–99)
POTASSIUM SERPL-SCNC: 4.7 MMOL/L (ref 3.7–5.3)
SODIUM SERPL-SCNC: 136 MMOL/L (ref 135–144)

## 2023-07-25 PROCEDURE — 80048 BASIC METABOLIC PNL TOTAL CA: CPT

## 2023-08-30 ENCOUNTER — APPOINTMENT (OUTPATIENT)
Dept: CT IMAGING | Age: 68
End: 2023-08-30
Payer: MEDICARE

## 2023-08-30 ENCOUNTER — APPOINTMENT (OUTPATIENT)
Dept: GENERAL RADIOLOGY | Age: 68
End: 2023-08-30
Payer: MEDICARE

## 2023-08-30 ENCOUNTER — HOSPITAL ENCOUNTER (INPATIENT)
Age: 68
LOS: 2 days | Discharge: HOME | End: 2023-09-01
Attending: EMERGENCY MEDICINE | Admitting: INTERNAL MEDICINE
Payer: MEDICARE

## 2023-08-30 ENCOUNTER — APPOINTMENT (OUTPATIENT)
Age: 68
End: 2023-08-30
Attending: STUDENT IN AN ORGANIZED HEALTH CARE EDUCATION/TRAINING PROGRAM
Payer: MEDICARE

## 2023-08-30 DIAGNOSIS — N30.00 ACUTE CYSTITIS WITHOUT HEMATURIA: ICD-10-CM

## 2023-08-30 DIAGNOSIS — I50.31 ACUTE DIASTOLIC CONGESTIVE HEART FAILURE (HCC): ICD-10-CM

## 2023-08-30 DIAGNOSIS — J44.9 CHRONIC OBSTRUCTIVE PULMONARY DISEASE, UNSPECIFIED COPD TYPE (HCC): Primary | ICD-10-CM

## 2023-08-30 PROBLEM — N18.32 STAGE 3B CHRONIC KIDNEY DISEASE (CKD) (HCC): Status: ACTIVE | Noted: 2023-08-30

## 2023-08-30 PROBLEM — R77.8 TROPONIN LEVEL ELEVATED: Status: ACTIVE | Noted: 2023-08-30

## 2023-08-30 PROBLEM — J96.22 ACUTE ON CHRONIC RESPIRATORY FAILURE WITH HYPOXIA AND HYPERCAPNIA (HCC): Status: ACTIVE | Noted: 2023-08-30

## 2023-08-30 PROBLEM — Z86.711 HISTORY OF PULMONARY EMBOLISM: Status: ACTIVE | Noted: 2023-08-30

## 2023-08-30 PROBLEM — D68.59 HYPERCOAGULABLE STATE (HCC): Status: ACTIVE | Noted: 2023-08-30

## 2023-08-30 PROBLEM — R26.2 AMBULATORY DYSFUNCTION: Status: ACTIVE | Noted: 2023-08-30

## 2023-08-30 PROBLEM — D53.9 ANEMIA, MACROCYTIC: Status: ACTIVE | Noted: 2023-08-30

## 2023-08-30 PROBLEM — J44.1 ACUTE EXACERBATION OF CHRONIC OBSTRUCTIVE PULMONARY DISEASE (COPD) (HCC): Status: ACTIVE | Noted: 2023-08-30

## 2023-08-30 PROBLEM — Z79.01 CHRONIC ANTICOAGULATION: Status: ACTIVE | Noted: 2023-08-30

## 2023-08-30 PROBLEM — J96.21 ACUTE ON CHRONIC RESPIRATORY FAILURE WITH HYPOXIA AND HYPERCAPNIA (HCC): Status: ACTIVE | Noted: 2023-08-30

## 2023-08-30 PROBLEM — R79.89 TROPONIN LEVEL ELEVATED: Status: ACTIVE | Noted: 2023-08-30

## 2023-08-30 PROBLEM — E83.52 HYPERCALCEMIA: Status: ACTIVE | Noted: 2023-08-30

## 2023-08-30 LAB
ALBUMIN SERPL-MCNC: 3.4 G/DL (ref 3.5–5.2)
ALBUMIN/GLOB SERPL: 0.8 {RATIO} (ref 1–2.5)
ALLEN TEST: ABNORMAL
ALP SERPL-CCNC: 84 U/L (ref 35–104)
ALT SERPL-CCNC: 10 U/L (ref 5–33)
ANION GAP SERPL CALCULATED.3IONS-SCNC: 8 MMOL/L (ref 9–17)
AST SERPL-CCNC: 16 U/L
BACTERIA URNS QL MICRO: ABNORMAL
BASOPHILS # BLD: 0.01 K/UL (ref 0–0.2)
BASOPHILS NFR BLD: 0 % (ref 0–2)
BILIRUB SERPL-MCNC: 0.2 MG/DL (ref 0.3–1.2)
BILIRUB UR QL STRIP: NEGATIVE
BNP SERPL-MCNC: 1631 PG/ML
BUN SERPL-MCNC: 31 MG/DL (ref 8–23)
CALCIUM SERPL-MCNC: 10.9 MG/DL (ref 8.6–10.4)
CHLORIDE SERPL-SCNC: 91 MMOL/L (ref 98–107)
CLARITY UR: ABNORMAL
CO2 SERPL-SCNC: 32 MMOL/L (ref 20–31)
COLOR UR: YELLOW
CREAT SERPL-MCNC: 1.4 MG/DL (ref 0.5–0.9)
ECHO BSA: 2.12 M2
ECHO EST RA PRESSURE: 5 MMHG
ECHO IVC PROX: 2 CM
ECHO LA AREA 4C: 15.9 CM2
ECHO LA MAJOR AXIS: 5.5 CM
ECHO LA VOL 4C: 39 ML (ref 22–52)
ECHO LA VOLUME INDEX A4C: 19 ML/M2 (ref 16–34)
ECHO MV A VELOCITY: 1.89 M/S
ECHO MV E DECELERATION TIME (DT): 151 MS
ECHO MV E VELOCITY: 0.89 M/S
ECHO MV E/A RATIO: 0.47
ECHO MV MAX VELOCITY: 1.9 M/S
ECHO MV MEAN GRADIENT: 6 MMHG
ECHO MV MEAN VELOCITY: 1.1 M/S
ECHO MV PEAK GRADIENT: 15 MMHG
ECHO MV VTI: 35.1 CM
EKG ATRIAL RATE: 105 BPM
EKG P AXIS: 27 DEGREES
EKG P-R INTERVAL: 190 MS
EKG Q-T INTERVAL: 334 MS
EKG QRS DURATION: 92 MS
EKG QTC CALCULATION (BAZETT): 439 MS
EKG R AXIS: 70 DEGREES
EKG T AXIS: 73 DEGREES
EKG VENTRICULAR RATE: 104 BPM
EOSINOPHIL # BLD: 0.14 K/UL (ref 0–0.4)
EOSINOPHILS RELATIVE PERCENT: 1 % (ref 1–4)
EPI CELLS #/AREA URNS HPF: ABNORMAL /HPF (ref 0–5)
ERYTHROCYTE [DISTWIDTH] IN BLOOD BY AUTOMATED COUNT: 12.8 % (ref 12.5–15.4)
FIO2: 3
GFR SERPL CREATININE-BSD FRML MDRD: 40 ML/MIN/1.73M2
GLUCOSE BLD-MCNC: 132 MG/DL (ref 65–105)
GLUCOSE SERPL-MCNC: 176 MG/DL (ref 70–99)
GLUCOSE UR STRIP-MCNC: ABNORMAL MG/DL
HCO3 VENOUS: 33.5 MMOL/L (ref 22–29)
HCT VFR BLD AUTO: 32.4 % (ref 36–46)
HGB BLD-MCNC: 9.6 G/DL (ref 12–16)
HGB UR QL STRIP.AUTO: ABNORMAL
INR PPP: 3
KETONES UR STRIP-MCNC: NEGATIVE MG/DL
LACTATE BLDV-SCNC: 1 MMOL/L (ref 0.5–2.2)
LEUKOCYTE ESTERASE UR QL STRIP: ABNORMAL
LYMPHOCYTES NFR BLD: 0.68 K/UL (ref 1–4.8)
LYMPHOCYTES RELATIVE PERCENT: 7 % (ref 24–44)
MCH RBC QN AUTO: 31.3 PG (ref 26–34)
MCHC RBC AUTO-ENTMCNC: 29.6 G/DL (ref 31–37)
MCV RBC AUTO: 105.5 FL (ref 80–100)
MONOCYTES NFR BLD: 0.46 K/UL (ref 0.1–1.2)
MONOCYTES NFR BLD: 5 % (ref 2–11)
MUCOUS THREADS URNS QL MICRO: ABNORMAL
NEUTROPHILS NFR BLD: 87 % (ref 36–66)
NEUTS SEG NFR BLD: 8.63 K/UL (ref 1.8–7.7)
NITRITE UR QL STRIP: POSITIVE
O2 DELIVERY DEVICE: ABNORMAL
O2 SAT, VEN: 38.5 % (ref 60–85)
PCO2, VEN: 78.4 MM HG (ref 41–51)
PH UR STRIP: 6 [PH] (ref 5–8)
PH VENOUS: 7.24 (ref 7.32–7.43)
PLATELET # BLD AUTO: 221 K/UL (ref 140–450)
PMV BLD AUTO: 11 FL (ref 8–14)
PO2, VEN: 27.5 MM HG (ref 30–50)
POSITIVE BASE EXCESS, VEN: 4.2 MMOL/L (ref 0–3)
POTASSIUM SERPL-SCNC: 4.4 MMOL/L (ref 3.7–5.3)
PROT SERPL-MCNC: 7.8 G/DL (ref 6.4–8.3)
PROT UR STRIP-MCNC: ABNORMAL MG/DL
PROTHROMBIN TIME: 30.8 SEC (ref 9.4–12.6)
RBC # BLD AUTO: 3.07 M/UL (ref 4–5.2)
RBC #/AREA URNS HPF: ABNORMAL /HPF (ref 0–2)
SARS-COV-2 RDRP RESP QL NAA+PROBE: NOT DETECTED
SODIUM SERPL-SCNC: 131 MMOL/L (ref 135–144)
SP GR UR STRIP: 1.01 (ref 1–1.03)
SPECIMEN DESCRIPTION: NORMAL
TROPONIN I SERPL HS-MCNC: 47 NG/L (ref 0–14)
TSH SERPL DL<=0.05 MIU/L-ACNC: 1.1 UIU/ML (ref 0.3–5)
UROBILINOGEN UR STRIP-ACNC: NORMAL EU/DL (ref 0–1)
WBC #/AREA URNS HPF: ABNORMAL /HPF (ref 0–5)
WBC OTHER # BLD: 9.9 K/UL (ref 3.5–11)

## 2023-08-30 PROCEDURE — 6360000002 HC RX W HCPCS: Performed by: INTERNAL MEDICINE

## 2023-08-30 PROCEDURE — 94640 AIRWAY INHALATION TREATMENT: CPT

## 2023-08-30 PROCEDURE — 2580000003 HC RX 258: Performed by: EMERGENCY MEDICINE

## 2023-08-30 PROCEDURE — 84443 ASSAY THYROID STIM HORMONE: CPT

## 2023-08-30 PROCEDURE — 93308 TTE F-UP OR LMTD: CPT

## 2023-08-30 PROCEDURE — 85025 COMPLETE CBC W/AUTO DIFF WBC: CPT

## 2023-08-30 PROCEDURE — 6360000004 HC RX CONTRAST MEDICATION: Performed by: INTERNAL MEDICINE

## 2023-08-30 PROCEDURE — 94660 CPAP INITIATION&MGMT: CPT

## 2023-08-30 PROCEDURE — 82803 BLOOD GASES ANY COMBINATION: CPT

## 2023-08-30 PROCEDURE — 97166 OT EVAL MOD COMPLEX 45 MIN: CPT

## 2023-08-30 PROCEDURE — 96365 THER/PROPH/DIAG IV INF INIT: CPT

## 2023-08-30 PROCEDURE — 80053 COMPREHEN METABOLIC PANEL: CPT

## 2023-08-30 PROCEDURE — 87088 URINE BACTERIA CULTURE: CPT

## 2023-08-30 PROCEDURE — 6370000000 HC RX 637 (ALT 250 FOR IP): Performed by: INTERNAL MEDICINE

## 2023-08-30 PROCEDURE — 97535 SELF CARE MNGMENT TRAINING: CPT

## 2023-08-30 PROCEDURE — 99223 1ST HOSP IP/OBS HIGH 75: CPT | Performed by: INTERNAL MEDICINE

## 2023-08-30 PROCEDURE — 2060000000 HC ICU INTERMEDIATE R&B

## 2023-08-30 PROCEDURE — 93308 TTE F-UP OR LMTD: CPT | Performed by: INTERNAL MEDICINE

## 2023-08-30 PROCEDURE — 6360000002 HC RX W HCPCS: Performed by: EMERGENCY MEDICINE

## 2023-08-30 PROCEDURE — 81001 URINALYSIS AUTO W/SCOPE: CPT

## 2023-08-30 PROCEDURE — 96375 TX/PRO/DX INJ NEW DRUG ADDON: CPT

## 2023-08-30 PROCEDURE — 93005 ELECTROCARDIOGRAM TRACING: CPT | Performed by: STUDENT IN AN ORGANIZED HEALTH CARE EDUCATION/TRAINING PROGRAM

## 2023-08-30 PROCEDURE — 6370000000 HC RX 637 (ALT 250 FOR IP): Performed by: EMERGENCY MEDICINE

## 2023-08-30 PROCEDURE — 87086 URINE CULTURE/COLONY COUNT: CPT

## 2023-08-30 PROCEDURE — 99285 EMERGENCY DEPT VISIT HI MDM: CPT

## 2023-08-30 PROCEDURE — 70450 CT HEAD/BRAIN W/O DYE: CPT

## 2023-08-30 PROCEDURE — 85610 PROTHROMBIN TIME: CPT

## 2023-08-30 PROCEDURE — 83605 ASSAY OF LACTIC ACID: CPT

## 2023-08-30 PROCEDURE — 5A09357 ASSISTANCE WITH RESPIRATORY VENTILATION, LESS THAN 24 CONSECUTIVE HOURS, CONTINUOUS POSITIVE AIRWAY PRESSURE: ICD-10-PCS | Performed by: INTERNAL MEDICINE

## 2023-08-30 PROCEDURE — 71045 X-RAY EXAM CHEST 1 VIEW: CPT

## 2023-08-30 PROCEDURE — 83880 ASSAY OF NATRIURETIC PEPTIDE: CPT

## 2023-08-30 PROCEDURE — 82947 ASSAY GLUCOSE BLOOD QUANT: CPT

## 2023-08-30 PROCEDURE — 87186 SC STD MICRODIL/AGAR DIL: CPT

## 2023-08-30 PROCEDURE — 36415 COLL VENOUS BLD VENIPUNCTURE: CPT

## 2023-08-30 PROCEDURE — 94761 N-INVAS EAR/PLS OXIMETRY MLT: CPT

## 2023-08-30 PROCEDURE — 87077 CULTURE AEROBIC IDENTIFY: CPT

## 2023-08-30 PROCEDURE — C8929 TTE W OR WO FOL WCON,DOPPLER: HCPCS

## 2023-08-30 PROCEDURE — 84484 ASSAY OF TROPONIN QUANT: CPT

## 2023-08-30 PROCEDURE — 87635 SARS-COV-2 COVID-19 AMP PRB: CPT

## 2023-08-30 RX ORDER — IPRATROPIUM BROMIDE AND ALBUTEROL SULFATE 2.5; .5 MG/3ML; MG/3ML
1 SOLUTION RESPIRATORY (INHALATION)
Status: DISCONTINUED | OUTPATIENT
Start: 2023-08-30 | End: 2023-08-30

## 2023-08-30 RX ORDER — SENNA AND DOCUSATE SODIUM 50; 8.6 MG/1; MG/1
1 TABLET, FILM COATED ORAL DAILY
Status: DISCONTINUED | OUTPATIENT
Start: 2023-08-30 | End: 2023-09-01 | Stop reason: HOSPADM

## 2023-08-30 RX ORDER — ALBUTEROL SULFATE 2.5 MG/3ML
2.5 SOLUTION RESPIRATORY (INHALATION) EVERY 4 HOURS PRN
COMMUNITY

## 2023-08-30 RX ORDER — ALBUTEROL SULFATE 2.5 MG/3ML
2.5 SOLUTION RESPIRATORY (INHALATION) EVERY 4 HOURS PRN
Status: DISCONTINUED | OUTPATIENT
Start: 2023-08-30 | End: 2023-09-01 | Stop reason: HOSPADM

## 2023-08-30 RX ORDER — SODIUM CHLORIDE 9 MG/ML
INJECTION, SOLUTION INTRAVENOUS PRN
Status: DISCONTINUED | OUTPATIENT
Start: 2023-08-30 | End: 2023-09-01 | Stop reason: HOSPADM

## 2023-08-30 RX ORDER — GABAPENTIN 300 MG/1
300 CAPSULE ORAL 2 TIMES DAILY
Status: DISCONTINUED | OUTPATIENT
Start: 2023-08-30 | End: 2023-09-01 | Stop reason: HOSPADM

## 2023-08-30 RX ORDER — GABAPENTIN 300 MG/1
300 CAPSULE ORAL 3 TIMES DAILY
Status: DISCONTINUED | OUTPATIENT
Start: 2023-08-30 | End: 2023-08-30

## 2023-08-30 RX ORDER — ONDANSETRON 4 MG/1
4 TABLET, ORALLY DISINTEGRATING ORAL EVERY 8 HOURS PRN
Status: DISCONTINUED | OUTPATIENT
Start: 2023-08-30 | End: 2023-09-01 | Stop reason: HOSPADM

## 2023-08-30 RX ORDER — SODIUM CHLORIDE 0.9 % (FLUSH) 0.9 %
5-40 SYRINGE (ML) INJECTION EVERY 12 HOURS SCHEDULED
Status: DISCONTINUED | OUTPATIENT
Start: 2023-08-30 | End: 2023-09-01 | Stop reason: HOSPADM

## 2023-08-30 RX ORDER — BISACODYL 10 MG
10 SUPPOSITORY, RECTAL RECTAL DAILY
Status: DISCONTINUED | OUTPATIENT
Start: 2023-08-30 | End: 2023-09-01 | Stop reason: HOSPADM

## 2023-08-30 RX ORDER — ACETAMINOPHEN 650 MG/1
650 SUPPOSITORY RECTAL EVERY 6 HOURS PRN
Status: DISCONTINUED | OUTPATIENT
Start: 2023-08-30 | End: 2023-09-01 | Stop reason: HOSPADM

## 2023-08-30 RX ORDER — ACETAMINOPHEN 325 MG/1
325 TABLET ORAL 3 TIMES DAILY PRN
COMMUNITY

## 2023-08-30 RX ORDER — ALBUTEROL SULFATE 2.5 MG/3ML
SOLUTION RESPIRATORY (INHALATION)
Status: DISPENSED
Start: 2023-08-30 | End: 2023-08-30

## 2023-08-30 RX ORDER — METHYLPREDNISOLONE SODIUM SUCCINATE 40 MG/ML
40 INJECTION, POWDER, LYOPHILIZED, FOR SOLUTION INTRAMUSCULAR; INTRAVENOUS DAILY
Status: DISCONTINUED | OUTPATIENT
Start: 2023-08-31 | End: 2023-08-30 | Stop reason: DRUGHIGH

## 2023-08-30 RX ORDER — HYDROCODONE BITARTRATE AND ACETAMINOPHEN 5; 325 MG/1; MG/1
1 TABLET ORAL EVERY 6 HOURS PRN
Status: DISCONTINUED | OUTPATIENT
Start: 2023-08-30 | End: 2023-09-01 | Stop reason: HOSPADM

## 2023-08-30 RX ORDER — MAGNESIUM HYDROXIDE/ALUMINUM HYDROXICE/SIMETHICONE 120; 1200; 1200 MG/30ML; MG/30ML; MG/30ML
5 SUSPENSION ORAL EVERY 6 HOURS PRN
Status: DISCONTINUED | OUTPATIENT
Start: 2023-08-30 | End: 2023-09-01 | Stop reason: HOSPADM

## 2023-08-30 RX ORDER — METHYLPREDNISOLONE SODIUM SUCCINATE 125 MG/2ML
125 INJECTION, POWDER, LYOPHILIZED, FOR SOLUTION INTRAMUSCULAR; INTRAVENOUS ONCE
Status: COMPLETED | OUTPATIENT
Start: 2023-08-30 | End: 2023-08-30

## 2023-08-30 RX ORDER — VITAMIN B COMPLEX
1000 TABLET ORAL DAILY
Status: DISCONTINUED | OUTPATIENT
Start: 2023-08-30 | End: 2023-09-01 | Stop reason: HOSPADM

## 2023-08-30 RX ORDER — POLYETHYLENE GLYCOL 3350 17 G/17G
17 POWDER, FOR SOLUTION ORAL DAILY
Status: DISCONTINUED | OUTPATIENT
Start: 2023-08-30 | End: 2023-09-01 | Stop reason: HOSPADM

## 2023-08-30 RX ORDER — ALBUTEROL SULFATE 2.5 MG/3ML
2.5 SOLUTION RESPIRATORY (INHALATION) EVERY 4 HOURS PRN
Status: CANCELLED | OUTPATIENT
Start: 2023-08-30

## 2023-08-30 RX ORDER — ACETAMINOPHEN 325 MG/1
325 TABLET ORAL EVERY 8 HOURS PRN
Status: DISCONTINUED | OUTPATIENT
Start: 2023-08-30 | End: 2023-09-01 | Stop reason: HOSPADM

## 2023-08-30 RX ORDER — VENLAFAXINE 75 MG/1
225 TABLET ORAL 3 TIMES DAILY
Status: DISCONTINUED | OUTPATIENT
Start: 2023-08-30 | End: 2023-09-01 | Stop reason: HOSPADM

## 2023-08-30 RX ORDER — LANOLIN ALCOHOL/MO/W.PET/CERES
325 CREAM (GRAM) TOPICAL
Status: DISCONTINUED | OUTPATIENT
Start: 2023-08-31 | End: 2023-09-01 | Stop reason: HOSPADM

## 2023-08-30 RX ORDER — ONDANSETRON 2 MG/ML
4 INJECTION INTRAMUSCULAR; INTRAVENOUS EVERY 6 HOURS PRN
Status: DISCONTINUED | OUTPATIENT
Start: 2023-08-30 | End: 2023-09-01 | Stop reason: HOSPADM

## 2023-08-30 RX ORDER — FUROSEMIDE 10 MG/ML
40 INJECTION INTRAMUSCULAR; INTRAVENOUS DAILY
Status: DISCONTINUED | OUTPATIENT
Start: 2023-08-31 | End: 2023-09-01 | Stop reason: HOSPADM

## 2023-08-30 RX ORDER — TORSEMIDE 20 MG/1
20 TABLET ORAL DAILY
Status: DISCONTINUED | OUTPATIENT
Start: 2023-08-30 | End: 2023-09-01 | Stop reason: HOSPADM

## 2023-08-30 RX ORDER — METHYLPREDNISOLONE SODIUM SUCCINATE 40 MG/ML
40 INJECTION, POWDER, LYOPHILIZED, FOR SOLUTION INTRAMUSCULAR; INTRAVENOUS EVERY 6 HOURS
Status: DISCONTINUED | OUTPATIENT
Start: 2023-08-30 | End: 2023-08-31 | Stop reason: SDUPTHER

## 2023-08-30 RX ORDER — SODIUM CHLORIDE 9 MG/ML
INJECTION, SOLUTION INTRAVENOUS CONTINUOUS
Status: DISCONTINUED | OUTPATIENT
Start: 2023-08-30 | End: 2023-08-30

## 2023-08-30 RX ORDER — WARFARIN SODIUM 1 MG/1
1 TABLET ORAL DAILY
Status: DISCONTINUED | OUTPATIENT
Start: 2023-08-30 | End: 2023-09-01

## 2023-08-30 RX ORDER — LORAZEPAM 0.5 MG/1
0.5 TABLET ORAL EVERY 6 HOURS PRN
Status: ON HOLD | COMMUNITY
End: 2023-09-01 | Stop reason: HOSPADM

## 2023-08-30 RX ORDER — ACETAMINOPHEN 325 MG/1
650 TABLET ORAL EVERY 6 HOURS PRN
Status: DISCONTINUED | OUTPATIENT
Start: 2023-08-30 | End: 2023-09-01 | Stop reason: HOSPADM

## 2023-08-30 RX ORDER — POTASSIUM CHLORIDE 750 MG/1
10 CAPSULE, EXTENDED RELEASE ORAL DAILY
Status: DISCONTINUED | OUTPATIENT
Start: 2023-08-31 | End: 2023-09-01 | Stop reason: HOSPADM

## 2023-08-30 RX ORDER — IPRATROPIUM BROMIDE AND ALBUTEROL SULFATE 2.5; .5 MG/3ML; MG/3ML
1 SOLUTION RESPIRATORY (INHALATION)
Status: DISCONTINUED | OUTPATIENT
Start: 2023-08-30 | End: 2023-08-31

## 2023-08-30 RX ORDER — FERROUS SULFATE 325(65) MG
325 TABLET ORAL
COMMUNITY

## 2023-08-30 RX ORDER — PREDNISONE 20 MG/1
40 TABLET ORAL DAILY
Status: DISCONTINUED | OUTPATIENT
Start: 2023-09-01 | End: 2023-08-31 | Stop reason: SDUPTHER

## 2023-08-30 RX ORDER — IPRATROPIUM BROMIDE AND ALBUTEROL SULFATE 2.5; .5 MG/3ML; MG/3ML
1 SOLUTION RESPIRATORY (INHALATION) ONCE
Status: COMPLETED | OUTPATIENT
Start: 2023-08-30 | End: 2023-08-30

## 2023-08-30 RX ORDER — ASPIRIN 81 MG/1
81 TABLET, CHEWABLE ORAL DAILY
Status: DISCONTINUED | OUTPATIENT
Start: 2023-08-30 | End: 2023-09-01 | Stop reason: HOSPADM

## 2023-08-30 RX ORDER — VENLAFAXINE 100 MG/1
225 TABLET ORAL 3 TIMES DAILY
COMMUNITY

## 2023-08-30 RX ORDER — SODIUM CHLORIDE 0.9 % (FLUSH) 0.9 %
5-40 SYRINGE (ML) INJECTION PRN
Status: DISCONTINUED | OUTPATIENT
Start: 2023-08-30 | End: 2023-09-01 | Stop reason: HOSPADM

## 2023-08-30 RX ORDER — METOPROLOL SUCCINATE 25 MG/1
25 TABLET, EXTENDED RELEASE ORAL DAILY
Status: DISCONTINUED | OUTPATIENT
Start: 2023-08-30 | End: 2023-09-01 | Stop reason: HOSPADM

## 2023-08-30 RX ORDER — ATORVASTATIN CALCIUM 40 MG/1
40 TABLET, FILM COATED ORAL NIGHTLY
Status: DISCONTINUED | OUTPATIENT
Start: 2023-08-30 | End: 2023-09-01 | Stop reason: HOSPADM

## 2023-08-30 RX ORDER — FUROSEMIDE 10 MG/ML
40 INJECTION INTRAMUSCULAR; INTRAVENOUS ONCE
Status: COMPLETED | OUTPATIENT
Start: 2023-08-30 | End: 2023-08-30

## 2023-08-30 RX ORDER — POLYETHYLENE GLYCOL 3350 17 G/17G
17 POWDER, FOR SOLUTION ORAL DAILY PRN
Status: DISCONTINUED | OUTPATIENT
Start: 2023-08-30 | End: 2023-09-01 | Stop reason: HOSPADM

## 2023-08-30 RX ORDER — ACETAMINOPHEN 325 MG/1
325 TABLET ORAL 3 TIMES DAILY PRN
Status: CANCELLED | OUTPATIENT
Start: 2023-08-30

## 2023-08-30 RX ADMIN — IPRATROPIUM BROMIDE AND ALBUTEROL SULFATE 1 DOSE: .5; 2.5 SOLUTION RESPIRATORY (INHALATION) at 03:14

## 2023-08-30 RX ADMIN — METHYLPREDNISOLONE SODIUM SUCCINATE 40 MG: 40 INJECTION INTRAMUSCULAR; INTRAVENOUS at 23:51

## 2023-08-30 RX ADMIN — CEFTRIAXONE SODIUM 1000 MG: 1 INJECTION, POWDER, FOR SOLUTION INTRAMUSCULAR; INTRAVENOUS at 07:14

## 2023-08-30 RX ADMIN — METOPROLOL SUCCINATE 25 MG: 25 TABLET, EXTENDED RELEASE ORAL at 17:46

## 2023-08-30 RX ADMIN — SENNOSIDES AND DOCUSATE SODIUM 1 TABLET: 50; 8.6 TABLET ORAL at 17:46

## 2023-08-30 RX ADMIN — PERFLUTREN 2 ML: 6.52 INJECTION, SUSPENSION INTRAVENOUS at 16:32

## 2023-08-30 RX ADMIN — IPRATROPIUM BROMIDE AND ALBUTEROL SULFATE 1 DOSE: 2.5; .5 SOLUTION RESPIRATORY (INHALATION) at 15:38

## 2023-08-30 RX ADMIN — FUROSEMIDE 40 MG: 10 INJECTION, SOLUTION INTRAMUSCULAR; INTRAVENOUS at 07:13

## 2023-08-30 RX ADMIN — METHYLPREDNISOLONE SODIUM SUCCINATE 125 MG: 125 INJECTION, POWDER, LYOPHILIZED, FOR SOLUTION INTRAMUSCULAR; INTRAVENOUS at 07:13

## 2023-08-30 RX ADMIN — METHYLPREDNISOLONE SODIUM SUCCINATE 40 MG: 40 INJECTION INTRAMUSCULAR; INTRAVENOUS at 12:22

## 2023-08-30 RX ADMIN — POLYETHYLENE GLYCOL 3350 17 G: 17 POWDER, FOR SOLUTION ORAL at 17:46

## 2023-08-30 RX ADMIN — Medication 1000 UNITS: at 17:46

## 2023-08-30 RX ADMIN — ALBUTEROL SULFATE 2.5 MG: 2.5 SOLUTION RESPIRATORY (INHALATION) at 11:30

## 2023-08-30 RX ADMIN — ASPIRIN 81 MG CHEWABLE TABLET 81 MG: 81 TABLET CHEWABLE at 17:46

## 2023-08-30 RX ADMIN — EMPAGLIFLOZIN 10 MG: 10 TABLET, FILM COATED ORAL at 17:46

## 2023-08-30 RX ADMIN — HYDROCODONE BITARTRATE AND ACETAMINOPHEN 1 TABLET: 5; 325 TABLET ORAL at 14:57

## 2023-08-30 RX ADMIN — TORSEMIDE 20 MG: 20 TABLET ORAL at 17:46

## 2023-08-30 RX ADMIN — IPRATROPIUM BROMIDE AND ALBUTEROL SULFATE 1 DOSE: 2.5; .5 SOLUTION RESPIRATORY (INHALATION) at 21:31

## 2023-08-30 RX ADMIN — METHYLPREDNISOLONE SODIUM SUCCINATE 40 MG: 40 INJECTION INTRAMUSCULAR; INTRAVENOUS at 17:47

## 2023-08-30 ASSESSMENT — ENCOUNTER SYMPTOMS
WHEEZING: 0
NAUSEA: 0
ALLERGIC/IMMUNOLOGIC NEGATIVE: 1
COLOR CHANGE: 0
TROUBLE SWALLOWING: 0
COUGH: 0
CONSTIPATION: 0
CHEST TIGHTNESS: 0
GASTROINTESTINAL NEGATIVE: 1
SHORTNESS OF BREATH: 1
BLOOD IN STOOL: 0
COUGH: 1
VOMITING: 0
BACK PAIN: 0
DIARRHEA: 0
SORE THROAT: 0
ABDOMINAL DISTENTION: 0
PHOTOPHOBIA: 0
ABDOMINAL PAIN: 0

## 2023-08-30 ASSESSMENT — PAIN SCALES - GENERAL
PAINLEVEL_OUTOF10: 6
PAINLEVEL_OUTOF10: 2

## 2023-08-30 ASSESSMENT — PAIN DESCRIPTION - LOCATION: LOCATION: BACK

## 2023-08-30 ASSESSMENT — PAIN - FUNCTIONAL ASSESSMENT: PAIN_FUNCTIONAL_ASSESSMENT: NONE - DENIES PAIN

## 2023-08-30 NOTE — CONSULTS
[address] [facility] PULMONARY, CRITICAL CARE & SLEEP  MD Tianna Mcgrath MD Newell Norman MD Retia Abts MD  [facility] APR   CONSULT/H&P NOTE      Date of Admission: 8/30/2023 12:45 AM    Chief complaint: Shortness of breath    Referring Physician: * No referring provider recorded for this case *  PCP: Gurpreet Wolfe     History of Present Illness: Renee Brown is a 77 y.o. Unavailable   female who presents with complaints of worsening shortness of breath, weakness and tremors over the last 2 to 3 days. The patient admits to me that she is actually been feeling this way for longer than the last few days but it seems to been dramatically worse over the last 48 hours. When she was evaluated here in the Emergency Department she was noted to be more hypoxic and acutely hypercapnic. They placed her on BiPAP support, gave her steroids and Lasix and she started to improve. They contacted the hospitalist for admission then intern consulted us for pulmonary evaluation. I came to bedside she was able to transition to nasal cannula and provide additional history. She reports that she previously established care with  and was informed that she has obstructive sleep apnea but was not able to tolerate wearing CPAP therapy. She was also previously established with a cardiologist and told that she had diastolic dysfunction. History also shows evidence of coronary artery disease. I do not see a recent cardiac evaluation but she had a conversation recently with her primary care physician who recommended repeat evaluation of both conditions. The diagnoses listed for admission is acute exacerbation of COPD however the patient self is a never smoker and has no history of prior obstructive lung disease. She does admit to seasonal allergies but denies any significant wheezing.   She is currently on albuterol HFA inhaler and nebulizer treatments

## 2023-08-30 NOTE — ED NOTES
[employer] Text To:    Estrada Archuleta DO   Patient:   Renee Brown     YOB: 1955  MRN:   [id number]  Location: [place] [address]   8/30/23 7:18 AM  [phone] Hospital or Facility: [facility] Emergency Department [place] NEW ADMISSION From: Dr Hankins Share: Edenilson Napoles: [id number] aDMIT CHF/UTI on Galileo Crain RN  08/30/23 0905

## 2023-08-30 NOTE — H&P
[facility]  Office: 7900  1826, DO, Means Market, DO, Elvira Vallejos, DO, Ileana Macias Blood, DO, Girma Palacios MD, Neda Benavides MD, Solomon Galeano MD, Zelda Calderon MD,  Lauren Caal MD, Luis Marcum MD, Jessy Aragon, DO, Sandra Schneider MD,  Bola Whitehead MD, Hector Paez MD, Shonda Mendoza, DO, Keaton Man MD,  Julienne Gipson, DO, Danny Tavarez MD, Graciela Xiong MD, Mann Miles MD, Alicia Jang MD,  Lance Menjivar MD, Meghan Simons MD, Jameson Shanks MD, Rusty Horton DO, Hi Gresham MD,  aDna Snyder MD, Adela Beach, CNP,  Malcom Heimlich, CNP,, Althea Pandya, CNP,  Jillian Santillan, DNP, Olayinka Esqueda, CNP, Efren Saini, CNP, Gricel Oliveira, CNP, Jim Caal, CNP, Julianne Adan, CNP, Garett Leonard, CNP, Mynor Tavarez PA-C, Chas Rascon, CNS, Mecca Aldana, CNP, Janay Akhtar, CNP         IN-PATIENT SERVICE  History and Physical  [facility]          Name: Ken Eduardo  MRN: [id number]     Acct: [de-identified]  Room: [address] [place] [address]    Admit Date: 8/30/2023  PCP: Monica Bennett    Chief Complaint:  Chief Complaint   Patient presents with    Fatigue     Weakness and tremors x2-3 days, ems gave 1gm calcium chloride, from home, chronic 3.5L NC        History of Present Illness:  Ken Eduardo is a 77 y.o.  female who presents with Fatigue (Weakness and tremors x2-3 days, ems gave 1gm calcium chloride, from home, chronic 3.5L NC)    Patient was admitted thru ER with:  Ken Eduardo is a 77 y.o. female who presents with generalized fatigue and tremors that she has had for the last several weeks but worse over the last 2 to 3 days. She does have an appointment with neurology in 2 weeks but felt that they got worse over the last 2 days. She denies anything similar. She has had a recent mission for hypoglycemia because she was taking 2 diabetes medications. \"     As noted above the patient was suppository rectally daily  Patient not taking: Reported on 8/30/2023    Historical Provider, MD   gabapentin (NEURONTIN) 300 MG capsule Take 1 capsule by mouth in the morning and at bedtime. Historical Provider, MD   metoprolol succinate (TOPROL XL) 25 MG extended release tablet Take 1 tablet by mouth daily    Historical Provider, MD   polyethylene glycol (GLYCOLAX) powder Take 17 g by mouth daily  Patient not taking: Reported on 8/30/2023    Historical Provider, MD   Multiple Vitamins-Minerals (MULTIVITAMIN ADULT PO) Take by mouth    Historical Provider, MD   HYDROcodone-acetaminophen (NORCO) 5-325 MG per tablet Take 1 tablet by mouth every 6 hours as needed for Pain. Historical Provider, MD   nystatin (MYCOSTATIN) 515116 UNIT/GM powder Apply topically 4 times daily Apply topically 4 times daily. Patient not taking: Reported on 8/30/2023    Historical Provider, MD   potassium chloride (MICRO-K) 10 MEQ extended release capsule Take 1 capsule by mouth daily    Historical Provider, MD   sennosides-docusate sodium (SENOKOT-S) 8.6-50 MG tablet Take 1 tablet by mouth daily  Patient not taking: Reported on 8/30/2023    Historical Provider, MD   vitamin D (CHOLECALCIFEROL) 1000 UNIT TABS tablet Take 1 tablet by mouth daily    Historical Provider, MD         Allergies:   Diphenhydramine-phenylephrine, Sudafed [pseudoephedrine], and Wellbutrin [bupropion]    Social Hx:  Tobacco:    reports that she has never smoked. She has never used smokeless tobacco.  Alcohol:      reports that she does not currently use alcohol. Drug Use:  reports no history of drug use. Family Hx; Family History   Problem Relation Age of Onset    Coronary Art Dis Mother     Coronary Art Dis Father        ROS:  Review of Systems   Constitutional:  Positive for activity change (Diminished) and fatigue (Poor exercise capacity). Negative for appetite change, chills, diaphoresis and fever. HENT:  Negative for congestion and nosebleeds.

## 2023-08-30 NOTE — PROGRESS NOTES
Occupational Therapy  [facility] [id number]  Occupational Therapy Initial Assessment    Name: Madelaine Rubin  : 1955  MRN: [id number]  Date of Service: 2023  Chief Complaint   Patient presents with    Fatigue     Weakness and tremors x2-3 days, ems gave 1gm calcium chloride, from home, chronic 3.5L NC        Discharge Recommendations:  Patient would benefit from continued therapy after discharge  OT Equipment Recommendations  Equipment Needed: Yes  Other: TBD       Patient Diagnosis(es): The primary encounter diagnosis was Chronic obstructive pulmonary disease, unspecified COPD type ([address] Diagnoses of Acute cystitis without hematuria and Acute diastolic congestive heart failure ([address]) were also pertinent to this visit. Past Medical History:  has a past medical history of Anemia, Apnea, sleep, CAD (coronary artery disease), CHF (congestive heart failure) ([address]), Chronic kidney disease, COPD (chronic obstructive pulmonary disease) ([address]), Depression, Diabetes mellitus ([address]), Heart abnormality, Hyperlipidemia, Hyponatremia, Incontinence of urine, Obesity, SIAD (syndrome of inappropriate antidiuresis) ([address]), Tinea corporis, and Vitamin D insufficiency. Past Surgical History:  has no past surgical history on file. Assessment   Performance deficits / Impairments: Decreased functional mobility ; Decreased safe awareness;Decreased balance;Decreased endurance;Decreased ADL status; Decreased posture  Assessment: admit with acute exacerbation COPD with the above deficits that affect functional Indep for ADLs. Pt is max assist to dep for ADLs and bed mobility, sit/ stand. Benefit from OT here and at discharge to maximize pt functional participation in self care and bed mobility.  Pt currently unsafe to return to prior living situation due to the level of physical assist for mobility and ADLs  Prognosis: Good  Decision Making: Medium Complexity  REQUIRES OT FOLLOW-UP: Yes  Activity assistance  Dressing: Stand by assistance  Grooming: Independent  Feeding: Independent  Toileting: Needs assistance  Homemaking Responsibilities: No  Ambulation Assistance: Non-ambulatory  Transfer Assistance: Needs assistance (freda delgadillo, pt sits in lift chair, w/c; in bed only to sleep)  Mode of Transportation: Dyan Mynor (adaptive Dyan Lowe for w/c)  Occupation: Retired  Leisure & Hobbies: watch TV,read       Objective                Safety Devices  Type of Devices: Call light within reach;Gait belt;Patient at risk for falls; Bed alarm in place; Left in bed  Restraints  Restraints Initially in Place: No    Balance  Sitting:  (poor sitting balance seated edge of bed, to attempts, no change in quality of balance; leans back and to the R, needed max assist to correct but unable to maintain for any period of time)    Functional mobility  Overall Level of Assistance: Other (comment) (pt is non ambulatory, uses freda elie to lift chair or electric w/c)     AROM: Within functional limits  Strength: Generally decreased, functional  Tone: Normal  Sensation: Impaired (pt states that B feet numb)    ADL  Feeding: Setup; Increased time to complete  Feeding Skilled Clinical Factors: based on cliniccal judgement  Grooming: Increased time to complete;Minimal assistance  Grooming Skilled Clinical Factors: based on clinical judgement  UE Bathing: Moderate assistance; Increased time to complete  LE Bathing: Dependent/Total  UE Dressing: Maximum assistance; Increased time to complete  UE Dressing Skilled Clinical Factors: gown, up over shoulders when sitting  LE Dressing: Maximum assistance;Dependent/Total  LE Dressing Skilled Clinical Factors: to don socks semi reclined  Toileting: Dependent/Total  Toileting Skilled Clinical Factors: pure wick  Additional Comments: at home spouse checks brief only 2x a day        Bed mobility  Rolling to Left: Maximum assistance  Supine to Sit: Maximum assistance  Sit to Supine: Maximum assistance  Scooting:

## 2023-08-30 NOTE — ED NOTES
Report given to [facility]     Elvia Brown RN  08/30/23 9822

## 2023-08-30 NOTE — ED NOTES
[employer] Text to:     ZAYNAB [place]   Patient:   Simona Bello     YOB: 1955  MRN:   [id number]  Location: [facility]    [id number]   8/30/23 5:43 AM  [address] or Facility: [facility] Emergency Department [facility] NEW ADMISSION From: Dr Wily Cazares RE: On license of [facility] RM: Baljeet Lambert RN  08/30/23 6942

## 2023-08-31 ENCOUNTER — APPOINTMENT (OUTPATIENT)
Dept: GENERAL RADIOLOGY | Age: 68
End: 2023-08-31
Payer: MEDICARE

## 2023-08-31 PROBLEM — T38.0X5A STEROID-INDUCED HYPERGLYCEMIA: Status: ACTIVE | Noted: 2023-08-31

## 2023-08-31 PROBLEM — R73.9 STEROID-INDUCED HYPERGLYCEMIA: Status: ACTIVE | Noted: 2023-08-31

## 2023-08-31 PROBLEM — N39.0 E. COLI UTI: Status: ACTIVE | Noted: 2023-08-31

## 2023-08-31 PROBLEM — B96.20 E. COLI UTI: Status: ACTIVE | Noted: 2023-08-31

## 2023-08-31 LAB
ANION GAP SERPL CALCULATED.3IONS-SCNC: 12 MMOL/L (ref 9–17)
BNP SERPL-MCNC: 2901 PG/ML
BUN SERPL-MCNC: 40 MG/DL (ref 8–23)
CA-I BLD-SCNC: 1.31 MMOL/L (ref 1.13–1.33)
CALCIUM SERPL-MCNC: 10.5 MG/DL (ref 8.6–10.4)
CHLORIDE SERPL-SCNC: 89 MMOL/L (ref 98–107)
CO2 SERPL-SCNC: 34 MMOL/L (ref 20–31)
CREAT SERPL-MCNC: 1.4 MG/DL (ref 0.5–0.9)
EKG ATRIAL RATE: 81 BPM
EKG P AXIS: 55 DEGREES
EKG P-R INTERVAL: 178 MS
EKG Q-T INTERVAL: 372 MS
EKG QRS DURATION: 94 MS
EKG QTC CALCULATION (BAZETT): 432 MS
EKG R AXIS: 45 DEGREES
EKG T AXIS: 61 DEGREES
EKG VENTRICULAR RATE: 81 BPM
GFR SERPL CREATININE-BSD FRML MDRD: 40 ML/MIN/1.73M2
GLUCOSE BLD-MCNC: 180 MG/DL (ref 65–105)
GLUCOSE BLD-MCNC: 334 MG/DL (ref 65–105)
GLUCOSE SERPL-MCNC: 228 MG/DL (ref 70–99)
INR PPP: 2.4
POTASSIUM SERPL-SCNC: 4.5 MMOL/L (ref 3.7–5.3)
PROTHROMBIN TIME: 25 SEC (ref 9.4–12.6)
SODIUM SERPL-SCNC: 135 MMOL/L (ref 135–144)
TROPONIN I SERPL HS-MCNC: 47 NG/L (ref 0–14)

## 2023-08-31 PROCEDURE — 2060000000 HC ICU INTERMEDIATE R&B

## 2023-08-31 PROCEDURE — 6360000002 HC RX W HCPCS: Performed by: STUDENT IN AN ORGANIZED HEALTH CARE EDUCATION/TRAINING PROGRAM

## 2023-08-31 PROCEDURE — 93005 ELECTROCARDIOGRAM TRACING: CPT | Performed by: INTERNAL MEDICINE

## 2023-08-31 PROCEDURE — 84484 ASSAY OF TROPONIN QUANT: CPT

## 2023-08-31 PROCEDURE — 2580000003 HC RX 258: Performed by: INTERNAL MEDICINE

## 2023-08-31 PROCEDURE — 36415 COLL VENOUS BLD VENIPUNCTURE: CPT

## 2023-08-31 PROCEDURE — 99232 SBSQ HOSP IP/OBS MODERATE 35: CPT | Performed by: INTERNAL MEDICINE

## 2023-08-31 PROCEDURE — 85610 PROTHROMBIN TIME: CPT

## 2023-08-31 PROCEDURE — 82330 ASSAY OF CALCIUM: CPT

## 2023-08-31 PROCEDURE — 71045 X-RAY EXAM CHEST 1 VIEW: CPT

## 2023-08-31 PROCEDURE — 6370000000 HC RX 637 (ALT 250 FOR IP): Performed by: INTERNAL MEDICINE

## 2023-08-31 PROCEDURE — 2700000000 HC OXYGEN THERAPY PER DAY

## 2023-08-31 PROCEDURE — 6360000002 HC RX W HCPCS: Performed by: INTERNAL MEDICINE

## 2023-08-31 PROCEDURE — 82947 ASSAY GLUCOSE BLOOD QUANT: CPT

## 2023-08-31 PROCEDURE — 94761 N-INVAS EAR/PLS OXIMETRY MLT: CPT

## 2023-08-31 PROCEDURE — 94660 CPAP INITIATION&MGMT: CPT

## 2023-08-31 PROCEDURE — 94760 N-INVAS EAR/PLS OXIMETRY 1: CPT

## 2023-08-31 PROCEDURE — 94640 AIRWAY INHALATION TREATMENT: CPT

## 2023-08-31 PROCEDURE — 83880 ASSAY OF NATRIURETIC PEPTIDE: CPT

## 2023-08-31 PROCEDURE — 97162 PT EVAL MOD COMPLEX 30 MIN: CPT

## 2023-08-31 PROCEDURE — 97530 THERAPEUTIC ACTIVITIES: CPT

## 2023-08-31 PROCEDURE — 80048 BASIC METABOLIC PNL TOTAL CA: CPT

## 2023-08-31 RX ORDER — PREDNISONE 20 MG/1
40 TABLET ORAL DAILY
Status: DISCONTINUED | OUTPATIENT
Start: 2023-09-01 | End: 2023-08-31

## 2023-08-31 RX ORDER — INSULIN LISPRO 100 [IU]/ML
0-4 INJECTION, SOLUTION INTRAVENOUS; SUBCUTANEOUS
Status: DISCONTINUED | OUTPATIENT
Start: 2023-08-31 | End: 2023-09-01 | Stop reason: HOSPADM

## 2023-08-31 RX ORDER — INSULIN LISPRO 100 [IU]/ML
0-4 INJECTION, SOLUTION INTRAVENOUS; SUBCUTANEOUS NIGHTLY
Status: DISCONTINUED | OUTPATIENT
Start: 2023-08-31 | End: 2023-09-01 | Stop reason: HOSPADM

## 2023-08-31 RX ORDER — DEXTROSE MONOHYDRATE 100 MG/ML
INJECTION, SOLUTION INTRAVENOUS CONTINUOUS PRN
Status: DISCONTINUED | OUTPATIENT
Start: 2023-08-31 | End: 2023-09-01 | Stop reason: HOSPADM

## 2023-08-31 RX ORDER — CIPROFLOXACIN 250 MG/1
250 TABLET, FILM COATED ORAL EVERY 12 HOURS SCHEDULED
Status: DISCONTINUED | OUTPATIENT
Start: 2023-08-31 | End: 2023-09-01 | Stop reason: HOSPADM

## 2023-08-31 RX ORDER — IPRATROPIUM BROMIDE AND ALBUTEROL SULFATE 2.5; .5 MG/3ML; MG/3ML
1 SOLUTION RESPIRATORY (INHALATION)
Status: DISCONTINUED | OUTPATIENT
Start: 2023-09-01 | End: 2023-09-01 | Stop reason: HOSPADM

## 2023-08-31 RX ORDER — METHYLPREDNISOLONE SODIUM SUCCINATE 125 MG/2ML
40 INJECTION, POWDER, LYOPHILIZED, FOR SOLUTION INTRAMUSCULAR; INTRAVENOUS EVERY 6 HOURS
Status: DISCONTINUED | OUTPATIENT
Start: 2023-08-31 | End: 2023-08-31

## 2023-08-31 RX ORDER — GLUCAGON 1 MG/ML
1 KIT INJECTION PRN
Status: DISCONTINUED | OUTPATIENT
Start: 2023-08-31 | End: 2023-09-01 | Stop reason: HOSPADM

## 2023-08-31 RX ADMIN — ATORVASTATIN CALCIUM 40 MG: 40 TABLET, FILM COATED ORAL at 20:25

## 2023-08-31 RX ADMIN — METOPROLOL SUCCINATE 25 MG: 25 TABLET, EXTENDED RELEASE ORAL at 08:40

## 2023-08-31 RX ADMIN — Medication 1000 UNITS: at 08:40

## 2023-08-31 RX ADMIN — IPRATROPIUM BROMIDE AND ALBUTEROL SULFATE 1 DOSE: 2.5; .5 SOLUTION RESPIRATORY (INHALATION) at 07:39

## 2023-08-31 RX ADMIN — FUROSEMIDE 40 MG: 10 INJECTION, SOLUTION INTRAMUSCULAR; INTRAVENOUS at 08:40

## 2023-08-31 RX ADMIN — IPRATROPIUM BROMIDE AND ALBUTEROL SULFATE 1 DOSE: 2.5; .5 SOLUTION RESPIRATORY (INHALATION) at 13:43

## 2023-08-31 RX ADMIN — IPRATROPIUM BROMIDE AND ALBUTEROL SULFATE 1 DOSE: 2.5; .5 SOLUTION RESPIRATORY (INHALATION) at 20:11

## 2023-08-31 RX ADMIN — METHYLPREDNISOLONE SODIUM SUCCINATE 40 MG: 40 INJECTION INTRAMUSCULAR; INTRAVENOUS at 06:28

## 2023-08-31 RX ADMIN — EMPAGLIFLOZIN 10 MG: 10 TABLET, FILM COATED ORAL at 08:40

## 2023-08-31 RX ADMIN — SODIUM CHLORIDE, PRESERVATIVE FREE 10 ML: 5 INJECTION INTRAVENOUS at 08:40

## 2023-08-31 RX ADMIN — INSULIN LISPRO 3 UNITS: 100 INJECTION, SOLUTION INTRAVENOUS; SUBCUTANEOUS at 16:05

## 2023-08-31 RX ADMIN — SODIUM CHLORIDE, PRESERVATIVE FREE 10 ML: 5 INJECTION INTRAVENOUS at 20:25

## 2023-08-31 RX ADMIN — WARFARIN SODIUM 1 MG: 1 TABLET ORAL at 17:04

## 2023-08-31 RX ADMIN — GABAPENTIN 300 MG: 300 CAPSULE ORAL at 20:25

## 2023-08-31 RX ADMIN — METHYLPREDNISOLONE SODIUM SUCCINATE 40 MG: 125 INJECTION, POWDER, LYOPHILIZED, FOR SOLUTION INTRAMUSCULAR; INTRAVENOUS at 12:23

## 2023-08-31 RX ADMIN — GABAPENTIN 300 MG: 300 CAPSULE ORAL at 08:40

## 2023-08-31 RX ADMIN — FERROUS SULFATE TAB EC 325 MG (65 MG FE EQUIVALENT) 325 MG: 325 (65 FE) TABLET DELAYED RESPONSE at 08:40

## 2023-08-31 RX ADMIN — POLYETHYLENE GLYCOL 3350 17 G: 17 POWDER, FOR SOLUTION ORAL at 08:41

## 2023-08-31 RX ADMIN — HYDROCODONE BITARTRATE AND ACETAMINOPHEN 1 TABLET: 5; 325 TABLET ORAL at 08:49

## 2023-08-31 RX ADMIN — POTASSIUM CHLORIDE 10 MEQ: 10 CAPSULE, COATED, EXTENDED RELEASE ORAL at 08:40

## 2023-08-31 RX ADMIN — ASPIRIN 81 MG CHEWABLE TABLET 81 MG: 81 TABLET CHEWABLE at 08:40

## 2023-08-31 RX ADMIN — CIPROFLOXACIN 250 MG: 250 TABLET, FILM COATED ORAL at 20:25

## 2023-08-31 RX ADMIN — SENNOSIDES AND DOCUSATE SODIUM 1 TABLET: 50; 8.6 TABLET ORAL at 08:40

## 2023-08-31 ASSESSMENT — ENCOUNTER SYMPTOMS
BACK PAIN: 1
NAUSEA: 0
SHORTNESS OF BREATH: 1
ABDOMINAL PAIN: 0
COUGH: 0
VOMITING: 0

## 2023-08-31 ASSESSMENT — PAIN DESCRIPTION - LOCATION
LOCATION: BACK

## 2023-08-31 ASSESSMENT — PAIN DESCRIPTION - DESCRIPTORS
DESCRIPTORS: ACHING;DISCOMFORT

## 2023-08-31 ASSESSMENT — PAIN DESCRIPTION - PAIN TYPE
TYPE: CHRONIC PAIN
TYPE: CHRONIC PAIN

## 2023-08-31 ASSESSMENT — PAIN DESCRIPTION - ORIENTATION
ORIENTATION: LOWER

## 2023-08-31 ASSESSMENT — PAIN SCALES - GENERAL
PAINLEVEL_OUTOF10: 7
PAINLEVEL_OUTOF10: 4
PAINLEVEL_OUTOF10: 4

## 2023-08-31 NOTE — PROGRESS NOTES
[facility]  PROGRESS NOTE    Room # 333/333-01   Name: Melanie Gusman            Church: [employer]      Reason for visit: Routine    I visited the patient. Admit Date & Time: 8/30/2023 12:45 AM    Assessment:  Melanie Gusman is a 77 y.o. female in the hospital because \"heart failure\". Upon entering the room patient was lying in bed. Patient shared about her current medical challenge. Patient shared about receiving encouragement from [employer] television show this morning. Patient's kathy and prayer are important to her and a source of strength. Patient shared that she feels \"overwhelmed\" at times. Patient also shared that her . Patient has good support from . Intervention:  I introduced myself and my title as  I offered space for patient  to express feelings, needs, and concerns and provided a ministry presence. This writer actively listened to patient. This writer validated patient's feelings. This write also offered a brief prayer for patient. Outcome:  Patient expressed gratitude for this  visit . Plan:  Chaplains will remain available to offer spiritual and emotional support as needed. Electronically signed by Caryle Miyamoto, on 8/31/2023 at 1:24 PM.  [address] [place] [address] Drive       08/31/23 1320   Encounter Summary   Encounter Overview/Reason  Initial Encounter   Service Provided For: Patient   Referral/Consult From: [facility]   Support System Spouse   Last Encounter  08/31/23   Complexity of Encounter Moderate   Begin Time 1255   End Time  1320   Total Time Calculated 25 min   Spiritual/Emotional needs   Type Spiritual Support   Assessment/Intervention/Outcome   Assessment Tearful; Hopeful;Coping   Intervention Sustaining Presence/Ministry of presence; Active listening;Nurtured Hope;Prayer (assurance of)/[place];Explored/Affirmed feelings, thoughts, concerns; Discussed relationship with God

## 2023-08-31 NOTE — PROGRESS NOTES
[facility]  Office: [address], DO, Daniel Dys, DO, Louana Finely, DO, Isabel Pastures Blood, DO, Laura Norman MD, Stefanie Smallwood MD, Kevyn Sweeney MD, Anai Davidson MD,  Dolores Dolan MD, Christy Jesus MD, Kelsey Shabazz, DO, Robert Segundo MD,  Andres Adame MD, Antelmo Brown MD, Kim Garcia, DO, Sarmad Villegas MD,  Trevor Chacko, DO, Natalia Radford MD, Cory Castillo MD, Elvie Olivarez MD, Joel Branham MD,  Ericka Lane MD, Alpa Rios MD, Vito Alford MD, Rodriguez Lu DO, Carmen Monahan MD,  Mc Merrill MD, Yusuf Moreland, CNP,  Duane Blas, CNP,, Shashank Dumont, CNP,  Leila Oconnell, [facility], Ml Cheek, CNP, Oksana Morrow, CNP, Roosevelt Vora, CNP, Melania Pabon, CNP, Eliseo Zambrano, CNP, Milana Frankel, CNP, LAKHWINDER DyerC, Cayden Salmeron, CNS, Elissa Rojas, CNP, Dianne Carmen, [facility]    Progress Note    8/31/2023    2:51 PM    Name:   Renee Brown  MRN:     [id number]     Acct:      [de-identified]   Room:   [address] [place] [address] Day:  1  Admit Date:  8/30/2023 12:45 AM    PCP:   Gurpreet Wolfe  Code Status:  Full Code    Subjective:     C/C:   Chief Complaint   Patient presents with    Fatigue     Weakness and tremors x2-3 days, ems gave 1gm calcium chloride, from home, chronic 3.5L NC     Interval History Status:   Improving  Less short of breath  Not using oxygen  Still feeling weak  Exercise capacity diminished  Somewhat confused this morning  \" She thought she was here to write an essay\"    Data Base Updates:  [id number] High mg/dL     BUN40 High mg/dL Creatinine1.4 High      Pro-BNP2,901 High      Troponin, High Ptiwjdsoyfg10 High      INR2.4     Culture, Urine [[id number]] (Abnormal) Collected: 08/30/23 0500 Updated: 08/31/23 0732 Specimen Source: Urine, straight catheter Specimen Description. URINE,STRAIGHT CATHETER CultureESCHERICHIA COLI 50 ,000 CFU/ML Daily    metoprolol succinate  25 mg Oral Daily    polyethylene glycol  17 g Oral Daily    potassium chloride  10 mEq Oral Daily    sennosides-docusate sodium  1 tablet Oral Daily    Vitamin D  1,000 Units Oral Daily    [Held by provider] torsemide  20 mg Oral Daily    sodium chloride flush  5-40 mL IntraVENous 2 times per day    furosemide  40 mg IntraVENous Daily    ipratropium 0.5 mg-albuterol 2.5 mg  1 Dose Inhalation TID RT    gabapentin  300 mg Oral BID    empagliflozin  10 mg Oral Daily    ferrous sulfate  325 mg Oral Daily with breakfast    venlafaxine  225 mg Oral TID    warfarin  1 mg Oral Daily     Continuous Infusions:    dextrose      sodium chloride       PRN Meds: glucose, dextrose bolus **OR** dextrose bolus, glucagon (rDNA), dextrose, aluminum & magnesium hydroxide-simethicone, HYDROcodone-acetaminophen, sodium chloride flush, sodium chloride, ondansetron **OR** ondansetron, polyethylene glycol, acetaminophen **OR** acetaminophen, albuterol, acetaminophen, albuterol    Data:     I/O (24Hr):     Intake/Output Summary (Last 24 hours) at 8/31/2023 1451  Last data filed at 8/31/2023 0451  Gross per 24 hour   Intake --   Output 1000 ml   Net -1000 ml       Labs:  Hematology:  Recent Labs     08/30/23  0125 08/31/23  0648   WBC 9.9  --    RBC 3.07*  --    HGB 9.6*  --    HCT 32.4*  --    .5*  --    MCH 31.3  --    MCHC 29.6*  --    RDW 12.8  --      --    MPV 11.0  --    INR 3.0 2.4     Chemistry:  Recent Labs     08/30/23  0125 08/31/23  0648   * 135   K 4.4 4.5   CL 91* 89*   CO2 32* 34*   GLUCOSE 176* 228*   BUN 31* 40*   CREATININE 1.4* 1.4*   ANIONGAP 8* 12   LABGLOM 40* 40*   CALCIUM 10.9* 10.5*   CAION  --  1.31   PROBNP 1,631* 2,901*   TROPHS 47* 47*     Recent Labs     08/30/23  0125 08/30/23  0927   PROT 7.8  --    LABALBU 3.4*  --    TSH 1.10  --    AST 16  --    ALT 10  --    ALKPHOS 84  --    BILITOT 0.2*  --    POCGLU  --  132*     ABG:  Lab Results   Component Value

## 2023-08-31 NOTE — PROGRESS NOTES
Infusions:      dextrose      sodium chloride       Meds:     Current Facility-Administered Medications:     insulin lispro (HUMALOG) injection vial 0-4 Units, 0-4 Units, SubCUTAneous, TID WC, Staci Reach, DO, 3 Units at 08/31/23 1605    insulin lispro (HUMALOG) injection vial 0-4 Units, 0-4 Units, SubCUTAneous, Nightly, Mir Oliveira, DO    glucose chewable tablet 16 g, 4 tablet, Oral, PRN, Staci Reach, DO    dextrose bolus 10% 125 mL, 125 mL, IntraVENous, PRN **OR** dextrose bolus 10% 250 mL, 250 mL, IntraVENous, PRN, Staci Reach, DO    glucagon injection 1 mg, 1 mg, IntraMUSCular, PRN, Staci Reach, DO    dextrose 10 % infusion, , IntraVENous, Continuous PRN, Staci Reach, DO    ciprofloxacin (CIPRO) tablet 250 mg, 250 mg, Oral, 2 times per day, Staci Reach, DO    aluminum & magnesium hydroxide-simethicone (MAALOX) 433-265-69 MG/5ML suspension 5 mL, 5 mL, Oral, Q6H PRN, Staci Reach, DO    aspirin chewable tablet 81 mg, 81 mg, Oral, Daily, Staci Reach, DO, 81 mg at 08/31/23 0840    atorvastatin (LIPITOR) tablet 40 mg, 40 mg, Oral, Nightly, Staci Reach, DO    bisacodyl (DULCOLAX) suppository 10 mg, 10 mg, Rectal, Daily, Staci Reach, DO    metoprolol succinate (TOPROL XL) extended release tablet 25 mg, 25 mg, Oral, Daily, Staci Reach, DO, 25 mg at 08/31/23 0840    polyethylene glycol (GLYCOLAX) packet 17 g, 17 g, Oral, Daily, Staci Reach, DO, 17 g at 08/31/23 0841    HYDROcodone-acetaminophen (NORCO) 5-325 MG per tablet 1 tablet, 1 tablet, Oral, Q6H PRN, Staci Reach, DO, 1 tablet at 08/31/23 0849    potassium chloride (MICRO-K) extended release capsule 10 mEq, 10 mEq, Oral, Daily, Staci Reach, DO, 10 mEq at 08/31/23 0840    sennosides-docusate sodium (SENOKOT-S) 8.6-50 MG tablet 1 tablet, 1 tablet, Oral, Daily, Staci Dayton, DO, 1 tablet at 08/31/23 0840    Vitamin D (CHOLECALCIFEROL) tablet 1,000 Units, 1,000 Units, Oral, restrictive breath  Obstructive sleep apnea-previously established care with Dr. Laura Severino but was not able to tolerate CPAP previously, has not followed up with her in several years  Obesity related hypoventilation  Coronary Heart Disease - Has not seen a cardiologist in several years  Suspected acute on chronic diastolic heart failure  Diabetes Mellitus type 2  Hyperlipidemia  SIADH  Patient was vaccinated and COVID-19  COVID-19 is not clinically suspected  Patient is a never smoker  Obesity-BMI 36  CODE STATUS-full    PLAN:   Clinically doing much better  Continue Lasix  Continue BiPAP at night  Patient would benefit from additional pulmonary work-up as an outpatient including PFT and PSG  I provided the patient with our office contact information yesterday  From my perspective the patient could be discharged tomorrow as long as the other services are in agreement    Critical Care Time: 0 minutes    If you have any questions, please feel free to contact me directly. Chanelle Price MD, [facility]  [phone] --[phone] (office/answering service)  [phone] (fax)      Electronically signed by Lennox Denis, MD on 08/31/23     This progress note was completed using a voice transcription system. Every effort was made to ensure accuracy. However, inadvertent computerized transcription errors may be present.     [phone] (office/answering service)

## 2023-08-31 NOTE — PROGRESS NOTES
Physical Therapy  Facility/Department: [facility] MED SURG ICU  Physical Therapy Initial Assessment    Name: Josiane Atkins  : 1955  MRN: [id number]  Date of Service: 2023    Discharge Recommendations:  Patient would benefit from continued therapy after discharge   PT Equipment Recommendations  Equipment Needed: Yes  Other: partial bed rail      Patient Diagnosis(es): The primary encounter diagnosis was Chronic obstructive pulmonary disease, unspecified COPD type ([address] Diagnoses of Acute cystitis without hematuria and Acute diastolic congestive heart failure ([address]) were also pertinent to this visit. Past Medical History:  has a past medical history of Anemia, Apnea, sleep, CAD (coronary artery disease), CHF (congestive heart failure) ([address]), Chronic kidney disease, COPD (chronic obstructive pulmonary disease) ([address]), Depression, Diabetes mellitus ([address]), Heart abnormality, Hyperlipidemia, Hyponatremia, Incontinence of urine, Obesity, SIAD (syndrome of inappropriate antidiuresis) ([address]), Tinea corporis, and Vitamin D insufficiency. Past Surgical History:  has a past surgical history that includes lumbar laminectomy and cervical laminectomy. Assessment   Body Structures, Functions, Activity Limitations Requiring Skilled Therapeutic Intervention: Decreased functional mobility ; Decreased strength;Decreased endurance;Decreased balance;Decreased posture; Increased pain  Assessment: Pt reports normally able to move supine to sit without assist and able to move sit to/from stand at bed and lift chair (elevated fully to stand) with Verle Collar.  moves pt in Verle Collar bed to lift chair where pt stays all day. Currently, pt requires maxA bed mobility, mod A to stand with Verle Collar and declined up to chair for PT evaluation. She complains of chronic low back pain 6/10 with hx back surgery and chronic foot drop.  Pt will need 24hr assist at discharge and would benefit from continued therapy to increase solutions;Assistance required to identify errors made;Assistance required to implement solutions;Assistance required to correct errors made  Insights: Decreased awareness of deficits (decreased insight in need for mobility to prevent weakness and need to only use lift chair at home when acute weakness or pain exacerbation or  unable to assist)  Initiation: Requires cues for some  Sequencing: Requires cues for some     Objective      Observation/Palpation  Posture: Poor (flexed trunk seated requiring peewee bedrails for best sitting balance or SBA; fatigues easily)  Edema: LE edema        Joint Mobility  ROM RLE: impaired hip grossly 30dg hip flex supine and trace ankle/foot AROM/foot drop; knee WFL  ROM LLE: impaired hip grossly 10-15deg hip flex supine and foot drop chronic; knee slow to move & increased effort but able near full ROM,  ROM RUE: WFL  ROM LUE: WFL  Strength RLE  Strength RLE: Exception  R Hip Flexion: 2-/5  R Hip ABduction: 3-/5  R Hip ADduction: 3-/5  R Knee Extension: 4+/5  R Ankle Dorsiflexion: 3+/5  R Ankle Plantar flexion: 1/5  R Ankle Inversion: 1/5  Strength LLE  Strength LLE: Exception  L Hip Flexion: 2-/5  L Hip ABduction: 2-/5  L Hip ADduction: 2-/5  L Knee Flexion: 4-/5  L Knee Extension: 3/5  L Ankle Dorsiflexion: 1/5  L Ankle Plantar Flexion: 1/5  Strength RUE  Strength RUE: WFL  Strength LUE  Strength LUE: WFL           Bed mobility  Rolling to Left: Moderate assistance (L bedrail; cues to flex R hip/knee first)  Supine to Sit: Maximum assistance (L bedrail; bed flat; pt pulls on deanne simulate home then had to use bedrail)  Sit to Supine: Maximum assistance  Scooting: Maximal assistance (to move pt to head of bed)  Bed Mobility Comments: sat bedside best with peewee bedrails until Autumn Genin set up; trial chair position of bed but decreased tolerance due to back pain and pt position in recliner normally less high and more like supine with HOB elevated higher than legs; pt positioned

## 2023-09-01 VITALS
TEMPERATURE: 98.8 F | OXYGEN SATURATION: 96 % | DIASTOLIC BLOOD PRESSURE: 78 MMHG | HEART RATE: 89 BPM | SYSTOLIC BLOOD PRESSURE: 126 MMHG | WEIGHT: 234.35 LBS | HEIGHT: 65 IN | RESPIRATION RATE: 18 BRPM | BODY MASS INDEX: 39.04 KG/M2

## 2023-09-01 LAB
ANION GAP SERPL CALCULATED.3IONS-SCNC: 10 MMOL/L (ref 9–17)
BNP SERPL-MCNC: 1876 PG/ML
BUN SERPL-MCNC: 55 MG/DL (ref 8–23)
CALCIUM SERPL-MCNC: 9.7 MG/DL (ref 8.6–10.4)
CHLORIDE SERPL-SCNC: 87 MMOL/L (ref 98–107)
CO2 SERPL-SCNC: 36 MMOL/L (ref 20–31)
CREAT SERPL-MCNC: 1.5 MG/DL (ref 0.5–0.9)
FOLATE SERPL-MCNC: >20 NG/ML
GFR SERPL CREATININE-BSD FRML MDRD: 37 ML/MIN/1.73M2
GLUCOSE BLD-MCNC: 179 MG/DL (ref 65–105)
GLUCOSE BLD-MCNC: 236 MG/DL (ref 65–105)
GLUCOSE BLD-MCNC: 276 MG/DL (ref 65–105)
GLUCOSE SERPL-MCNC: 195 MG/DL (ref 70–99)
INR PPP: 1.6
POTASSIUM SERPL-SCNC: 4.3 MMOL/L (ref 3.7–5.3)
PROTHROMBIN TIME: 16.9 SEC (ref 9.4–12.6)
SODIUM SERPL-SCNC: 133 MMOL/L (ref 135–144)
VIT B12 SERPL-MCNC: 794 PG/ML (ref 232–1245)

## 2023-09-01 PROCEDURE — 6370000000 HC RX 637 (ALT 250 FOR IP): Performed by: INTERNAL MEDICINE

## 2023-09-01 PROCEDURE — 85610 PROTHROMBIN TIME: CPT

## 2023-09-01 PROCEDURE — 94660 CPAP INITIATION&MGMT: CPT

## 2023-09-01 PROCEDURE — 94761 N-INVAS EAR/PLS OXIMETRY MLT: CPT

## 2023-09-01 PROCEDURE — 80048 BASIC METABOLIC PNL TOTAL CA: CPT

## 2023-09-01 PROCEDURE — 36415 COLL VENOUS BLD VENIPUNCTURE: CPT

## 2023-09-01 PROCEDURE — 6360000002 HC RX W HCPCS: Performed by: INTERNAL MEDICINE

## 2023-09-01 PROCEDURE — 82607 VITAMIN B-12: CPT

## 2023-09-01 PROCEDURE — 99239 HOSP IP/OBS DSCHRG MGMT >30: CPT | Performed by: INTERNAL MEDICINE

## 2023-09-01 PROCEDURE — 83880 ASSAY OF NATRIURETIC PEPTIDE: CPT

## 2023-09-01 PROCEDURE — 2700000000 HC OXYGEN THERAPY PER DAY

## 2023-09-01 PROCEDURE — 2580000003 HC RX 258: Performed by: INTERNAL MEDICINE

## 2023-09-01 PROCEDURE — 82746 ASSAY OF FOLIC ACID SERUM: CPT

## 2023-09-01 PROCEDURE — 94640 AIRWAY INHALATION TREATMENT: CPT

## 2023-09-01 PROCEDURE — 82947 ASSAY GLUCOSE BLOOD QUANT: CPT

## 2023-09-01 RX ORDER — PREDNISONE 20 MG/1
20 TABLET ORAL DAILY
Qty: 4 TABLET | Refills: 0 | Status: SHIPPED | OUTPATIENT
Start: 2023-09-01 | End: 2023-09-05

## 2023-09-01 RX ORDER — CIPROFLOXACIN 250 MG/1
250 TABLET, FILM COATED ORAL EVERY 12 HOURS SCHEDULED
Qty: 8 TABLET | Refills: 0 | Status: SHIPPED | OUTPATIENT
Start: 2023-09-01 | End: 2023-09-05

## 2023-09-01 RX ORDER — PREDNISONE 20 MG/1
40 TABLET ORAL DAILY
Status: DISCONTINUED | OUTPATIENT
Start: 2023-09-01 | End: 2023-09-01 | Stop reason: HOSPADM

## 2023-09-01 RX ORDER — WARFARIN SODIUM 5 MG/1
5 TABLET ORAL
Status: DISCONTINUED | OUTPATIENT
Start: 2023-09-01 | End: 2023-09-01 | Stop reason: HOSPADM

## 2023-09-01 RX ADMIN — SODIUM CHLORIDE, PRESERVATIVE FREE 10 ML: 5 INJECTION INTRAVENOUS at 09:30

## 2023-09-01 RX ADMIN — INSULIN LISPRO 2 UNITS: 100 INJECTION, SOLUTION INTRAVENOUS; SUBCUTANEOUS at 13:08

## 2023-09-01 RX ADMIN — FERROUS SULFATE TAB EC 325 MG (65 MG FE EQUIVALENT) 325 MG: 325 (65 FE) TABLET DELAYED RESPONSE at 09:30

## 2023-09-01 RX ADMIN — POTASSIUM CHLORIDE 10 MEQ: 10 CAPSULE, COATED, EXTENDED RELEASE ORAL at 09:29

## 2023-09-01 RX ADMIN — IPRATROPIUM BROMIDE AND ALBUTEROL SULFATE 1 DOSE: .5; 3 SOLUTION RESPIRATORY (INHALATION) at 08:25

## 2023-09-01 RX ADMIN — ONDANSETRON 4 MG: 2 INJECTION INTRAMUSCULAR; INTRAVENOUS at 09:23

## 2023-09-01 RX ADMIN — ASPIRIN 81 MG CHEWABLE TABLET 81 MG: 81 TABLET CHEWABLE at 09:29

## 2023-09-01 RX ADMIN — HYDROCODONE BITARTRATE AND ACETAMINOPHEN 1 TABLET: 5; 325 TABLET ORAL at 16:01

## 2023-09-01 RX ADMIN — POLYETHYLENE GLYCOL 3350 17 G: 17 POWDER, FOR SOLUTION ORAL at 09:30

## 2023-09-01 RX ADMIN — EMPAGLIFLOZIN 10 MG: 10 TABLET, FILM COATED ORAL at 09:30

## 2023-09-01 RX ADMIN — CIPROFLOXACIN 250 MG: 250 TABLET, FILM COATED ORAL at 09:30

## 2023-09-01 RX ADMIN — Medication 1000 UNITS: at 09:29

## 2023-09-01 RX ADMIN — SENNOSIDES AND DOCUSATE SODIUM 1 TABLET: 50; 8.6 TABLET ORAL at 09:30

## 2023-09-01 RX ADMIN — PREDNISONE 40 MG: 20 TABLET ORAL at 13:17

## 2023-09-01 RX ADMIN — GABAPENTIN 300 MG: 300 CAPSULE ORAL at 09:30

## 2023-09-01 RX ADMIN — METOPROLOL SUCCINATE 25 MG: 25 TABLET, EXTENDED RELEASE ORAL at 09:30

## 2023-09-01 ASSESSMENT — ENCOUNTER SYMPTOMS
ABDOMINAL PAIN: 0
NAUSEA: 0
VOMITING: 0
SHORTNESS OF BREATH: 1
BACK PAIN: 1
COUGH: 0

## 2023-09-01 ASSESSMENT — PAIN DESCRIPTION - LOCATION: LOCATION: COCCYX

## 2023-09-01 ASSESSMENT — PAIN SCALES - GENERAL: PAINLEVEL_OUTOF10: 7

## 2023-09-01 NOTE — PROGRESS NOTES
Occupational [address]  Occupational Therapy Not Seen Note    DATE: 2023    NAME: Jerome Schlatter  MRN: [id number]   : 1955      Patient not seen this date for Occupational Therapy due to:    Patient Declined: Pt refused OT this PM. Pt stating she does not want to do therapy prior to leaving later this PM. Pt stating \"I am getting my pain medications right before I leave and I don't want to do anything until then. \"     Next Scheduled Treatment: N/A    Electronically signed by Bruna Doty OT on 2023 at 1:41 PM

## 2023-09-01 NOTE — PROGRESS NOTES
Discharge teaching and instructions completed with patient. AVS and prescriptions reviewed. Patient verbalized understanding follow up and care planning. IV and telemetry removed.     Pt left via private vehicle with family

## 2023-09-01 NOTE — PROGRESS NOTES
Pharmacy Note  Warfarin Consult    Simona Bello is a 77 y.o. female for whom pharmacy has been consulted to manage warfarin therapy. Consulting Physician: Dr. Sarmad Franklin  Reason for Admission: HF    Warfarin dose prior to admission: 5 mg Mon, Thurs, Sun, 2.5 mg AOD (per [address] [place] [address] 22)  Warfarin indication: h/o PE  Target INR range: 2-3     Past Medical History:   Diagnosis Date    Anemia     Apnea, sleep     CAD (coronary artery disease)     CHF (congestive heart failure) ([facility])     Chronic kidney disease     COPD (chronic obstructive pulmonary disease) ([facility])     Depression     Diabetes mellitus ([facility])     Heart abnormality     Hyperlipidemia     Hyponatremia     Incontinence of urine     Obesity     SIAD (syndrome of inappropriate antidiuresis) ([facility])     Tinea corporis     Vitamin D insufficiency                 Recent Labs     09/01/23  0504   INR 1.6     Recent Labs     08/30/23  0125   HGB 9.6*   HCT 32.4*          Current warfarin drug-drug interactions: Aspirin, cipro      Date             INR        Dose   9/1/2023            1.6       5 mg    Patient received 1 mg dose yesterday. Home dose today would be 2.5 mg but giving booster dose of 5 mg. Daily PT/INR while inpatient. Thank you for the consult. Will continue to follow.      Jelena Storey PharmD  [facility]  9/1/2023 11:12 AM

## 2023-09-01 NOTE — PROGRESS NOTES
Physical Therapy        Physical Therapy Cancel Note      DATE: 2023    NAME: Olivier Ellison  MRN: [id number]   : 1955      Patient not seen this date for Physical Therapy due to:    Pt refusing therapy this afternoon. Per OT, pt stated she does not want to do therapy prior to leaving later this PM. Pt stated \"I am getting my pain medications right before I leave and I don't want to do anything until then. \" Will continue to pursue PT treatment if pt remains in the hospital.      Electronically signed by Todd Rivera PT on 2023 at 2:14 PM

## 2023-09-01 NOTE — PROGRESS NOTES
WBC 9.9 08/30/2023    HGB 9.6 (L) 08/30/2023    HCT 32.4 (L) 08/30/2023    .5 (H) 08/30/2023     08/30/2023     Lab Results   Component Value Date    CALCIUM 9.7 09/01/2023     (L) 09/01/2023    K 4.3 09/01/2023    CO2 36 (H) 09/01/2023    CL 87 (L) 09/01/2023    BUN 55 (H) 09/01/2023    CREATININE 1.5 (H) 09/01/2023     No components found for: ABGSAMPLETYP, ABGBODYTEMP, ABGPHCORRFOR, ENGYPV1GJOYNE, ABGPHCORRFOOR, ABGPH, ABGPCO2, ABGPO2, ABGBASEEXCES, ABGBASEDEFIC, ABGHCO3, SSNF0HEV, ABGENDTIDALC, ABGALLENSTES, ABGSPO2, ABGSAMEPLESIT, [id number], ABGOXYGENSOU  Lab Results   Component Value Date    INR 1.6 09/01/2023    PROTIME 16.9 (H) 09/01/2023       Radiology:   XR CHEST PORTABLE    Result Date: 8/31/2023  EXAMINATION: ONE XRAY VIEW OF THE CHEST 8/31/2023 8:57 am COMPARISON: 08/30/2023, 07/05/2023 HISTORY: ORDERING SYSTEM PROVIDED HISTORY: AECOPD TECHNOLOGIST PROVIDED HISTORY: AECOPD Reason for Exam: Acute heart failure with preserved ejection fraction [facility] FINDINGS: The cardiomediastinal silhouette is unchanged in appearance. Chronic elevation of the left hemidiaphragm and associated compressive atelectasis again noted. There is no consolidation, pneumothorax, or evidence of edema. No effusion is appreciated. The osseous structures are unchanged in appearance. No significant interval change. Transthoracic echocardiogram (TTE) limited with contrast, bubble, strain, and 3D PRN    Result Date: 8/30/2023    Left Ventricle: Hyperdynamic left ventricular systolic function with a visually estimated EF of 70 - 75%. Definity contrast was administered due to poor endocardial definition. Right Ventricle: Not well visualized. Mitral Valve: Not well visualized. Moderate annular calcification of the mitral valve. Contrast used: Definity. Very challenging study, walls and valves not well visaulized. My reading of film: Agree with radiologist interpretation.     ASSESSMENT: be safely discharged    Critical Care Time: 0 minutes    If you have any questions, please feel free to contact me directly. Francisco Cam [facility]  [phone] --(Cell)  [phone] (office/answering service)  [phone] (fax)      Electronically signed by Lucia Storm MD on 09/01/23     This progress note was completed using a voice transcription system. Every effort was made to ensure accuracy. However, inadvertent computerized transcription errors may be present.     [phone] (office/answering service)

## 2023-09-01 NOTE — PLAN OF CARE
Problem: Discharge Planning  Goal: Discharge to home or other facility with appropriate resources  Outcome: Progressing     Problem: Skin/Tissue Integrity  Goal: Absence of new skin breakdown  Description: 1. Monitor for areas of redness and/or skin breakdown  2. Assess vascular access sites hourly  3. Every 4-6 hours minimum:  Change oxygen saturation probe site  4. Every 4-6 hours:  If on nasal continuous positive airway pressure, respiratory therapy assess nares and determine need for appliance change or resting period.   Outcome: Progressing     Problem: Safety - Adult  Goal: Free from fall injury  Outcome: Progressing     Problem: Respiratory - Adult  Goal: Achieves optimal ventilation and oxygenation  8/31/2023 0644 by Jessica Orta RN  Outcome: Progressing  Flowsheets (Taken 8/31/2023 6708)  Achieves optimal ventilation and oxygenation:   Assess for changes in respiratory status   Assess for changes in mentation and behavior   Assess and instruct to report shortness of breath or any respiratory difficulty   Respiratory therapy support as indicated   Position to facilitate oxygenation and minimize respiratory effort   Oxygen supplementation based on oxygen saturation or arterial blood gases  8/30/2023 2135 by ROSS Austin  Outcome: Progressing  Flowsheets (Taken 8/30/2023 2135)  Achieves optimal ventilation and oxygenation:   Assess for changes in respiratory status   Assess for changes in mentation and behavior   Position to facilitate oxygenation and minimize respiratory effort   Oxygen supplementation based on oxygen saturation or arterial blood gases   Encourage broncho-pulmonary hygiene including cough, deep breathe, incentive spirometry   Respiratory therapy support as indicated   Assess and instruct to report shortness of breath or any respiratory difficulty
Problem: Discharge Planning  Goal: Discharge to home or other facility with appropriate resources  Outcome: Progressing  Flowsheets (Taken 8/31/2023 2000)  Discharge to home or other facility with appropriate resources:   Identify barriers to discharge with patient and caregiver   Arrange for needed discharge resources and transportation as appropriate   Identify discharge learning needs (meds, wound care, etc)   Refer to discharge planning if patient needs post-hospital services based on physician order or complex needs related to functional status, cognitive ability or social support system     Problem: Skin/Tissue Integrity  Goal: Absence of new skin breakdown  Description: 1. Monitor for areas of redness and/or skin breakdown  2. Assess vascular access sites hourly  3. Every 4-6 hours minimum:  Change oxygen saturation probe site  4. Every 4-6 hours:  If on nasal continuous positive airway pressure, respiratory therapy assess nares and determine need for appliance change or resting period.   Outcome: Progressing     Problem: Safety - Adult  Goal: Free from fall injury  Outcome: Progressing     Problem: Respiratory - Adult  Goal: Achieves optimal ventilation and oxygenation  8/31/2023 2016 by Latanya Arzate RCP  Outcome: Progressing  Flowsheets (Taken 8/31/2023 2016)  Achieves optimal ventilation and oxygenation:   Assess for changes in respiratory status   Oxygen supplementation based on oxygen saturation or arterial blood gases   Encourage broncho-pulmonary hygiene including cough, deep breathe, incentive spirometry   Assess and instruct to report shortness of breath or any respiratory difficulty   Respiratory therapy support as indicated     Problem: Chronic Conditions and Co-morbidities  Goal: Patient's chronic conditions and co-morbidity symptoms are monitored and maintained or improved  Outcome: Progressing  Flowsheets (Taken 8/31/2023 2000)  Care Plan - Patient's Chronic Conditions and
Problem: Respiratory - Adult  Goal: Achieves optimal ventilation and oxygenation  8/30/2023 2135 by ROSS Mitchell  Outcome: Progressing  Flowsheets (Taken 8/30/2023 2135)  Achieves optimal ventilation and oxygenation:   Assess for changes in respiratory status   Assess for changes in mentation and behavior   Position to facilitate oxygenation and minimize respiratory effort   Oxygen supplementation based on oxygen saturation or arterial blood gases   Encourage broncho-pulmonary hygiene including cough, deep breathe, incentive spirometry   Respiratory therapy support as indicated   Assess and instruct to report shortness of breath or any respiratory difficulty
Problem: Respiratory - Adult  Goal: Achieves optimal ventilation and oxygenation  8/31/2023 0745 by Mane Pabon RCP  Outcome: Progressing
Problem: Respiratory - Adult  Goal: Achieves optimal ventilation and oxygenation  8/31/2023 2016 by Roni Hernandez RCP  Outcome: Progressing  Flowsheets (Taken 8/31/2023 2016)  Achieves optimal ventilation and oxygenation:   Assess for changes in respiratory status   Oxygen supplementation based on oxygen saturation or arterial blood gases   Encourage broncho-pulmonary hygiene including cough, deep breathe, incentive spirometry   Assess and instruct to report shortness of breath or any respiratory difficulty   Respiratory therapy support as indicated
Problem: Respiratory - Adult  Goal: Achieves optimal ventilation and oxygenation  9/1/2023 0849 by Sam Garcia RCP  Outcome: Progressing  Flowsheets (Taken 9/1/2023 0849)  Achieves optimal ventilation and oxygenation:   Assess for changes in respiratory status   Respiratory therapy support as indicated   Assess for changes in mentation and behavior   Assess and instruct to report shortness of breath or any respiratory difficulty
Problem: Respiratory - Adult  Goal: Achieves optimal ventilation and oxygenation  Flowsheets (Taken 8/30/2023 1125)  Achieves optimal ventilation and oxygenation:   Assess for changes in respiratory status   Respiratory therapy support as indicated     Problem: Respiratory - Adult  Goal: Achieves optimal ventilation and oxygenation  Flowsheets (Taken 8/30/2023 1125)  Achieves optimal ventilation and oxygenation:   Assess for changes in respiratory status   Respiratory therapy support as indicated
Problem: Skin/Tissue Integrity  Goal: Absence of new skin breakdown  Description: 1. Monitor for areas of redness and/or skin breakdown  2. Assess vascular access sites hourly  3. Every 4-6 hours minimum:  Change oxygen saturation probe site  4. Every 4-6 hours:  If on nasal continuous positive airway pressure, respiratory therapy assess nares and determine need for appliance change or resting period. Outcome: Progressing   Skin assessment preformed. Pt turned every 2 hours with heals elevated off bed. Webster mattress in place. Will continue to monitor. Problem: Safety - Adult  Goal: Free from fall injury  Outcome: Progressing   Patient assessed for fall risk; fall precautions initiated. Patient and family instructed about safety devices. Environment kept free of clutter and adequate lighting provided. Bed locked and in lowest position. Call light within reach. Will continue to monitor. Problem: Respiratory - Adult  Goal: Achieves optimal ventilation and oxygenation  8/30/2023 1404 by Jessi Alvarado RN  Outcome: Progressing  8/30/2023 1125 by Bowen Andersen RCP  Flowsheets  Taken 8/30/2023 1125 by Bowen Andersen RCP  Achieves optimal ventilation and oxygenation:   Assess for changes in respiratory status   Respiratory therapy support as indicated  Taken 8/30/2023 0900 by Jessi Alvarado RN  Achieves optimal ventilation and oxygenation:   Assess for changes in respiratory status   Position to facilitate oxygenation and minimize respiratory effort   Assess for changes in mentation and behavior   Oxygen supplementation based on oxygen saturation or arterial blood gases   Assess the need for suctioning and aspirate as needed   Encourage broncho-pulmonary hygiene including cough, deep breathe, incentive spirometry   Assess and instruct to report shortness of breath or any respiratory difficulty   Respiratory thereapy support as indicated   Coughing deep breathing encouraged.  supplemental O2 used as
oxygen saturation or arterial blood gases     Problem: Chronic Conditions and Co-morbidities  Goal: Patient's chronic conditions and co-morbidity symptoms are monitored and maintained or improved  Outcome: Progressing     Problem: Pain  Goal: Verbalizes/displays adequate comfort level or baseline comfort level  Outcome: Progressing

## 2023-09-01 NOTE — PROGRESS NOTES
[facility]  Office: [address], DO, Donta Montero, DO, Marcelo Dsouza, DO, Brittany Wharton Blood, DO, Miguel Frazier MD, Jaden Saavedra MD, Marce Vickers MD, Warner Mchugh MD,  Lance Sevilla MD, Greg Aggarwal MD, Stacy Espino, DO, Aristeo Roa MD,  Jorge Thomas MD, Sebastian Trotter MD, Venkat Pugh, DO, Elmira Rodriguez MD,  Marilou Valerio DO, Wally Paul MD, Yan Mario MD, Damon Little MD, Arcelia Perez MD,  Nas Albarran MD, Meghann Mcdonnell MD, Tony Sebastian MD, Nela Garza, DO, Matt Delaney MD,  Sam Pratt MD, Soto Ramirez, CNP,  Parag Brown, CNP,, Stephane Pina, CNP,  Amanda Sims, [facility], Michele Escobedo, [facility] [place], Margarita Ruiz, CNP, Uriel Rao, CNP, Gene Tapia, CNP, Marina Machado, CNP, Roscoe Doss, CNP, LAKHWINDER JacobC, Marisabel Powell, CNS, Jimmy Yates, [facility] [place], Tate Camacho, [address] [facility]    Progress Note    9/1/2023    11:59 AM    Name:   Marsiel Velasquez  MRN:     [id number]     Acct:      [de-identified]   Room:   [address] [place] [address] Day:  2  Admit Date:  8/30/2023 12:45 AM    PCP:   Luisana Batista  Code Status:  Full Code    Subjective:     C/C:   Chief Complaint   Patient presents with    Fatigue     Weakness and tremors x2-3 days, ems gave 1gm calcium chloride, from home, chronic 3.5L NC     Interval History Status:   Improving  Stronger  Less MILLER  Has been using BiPAP last night   Hoping to go home today    Data Base Updates:  She has been in negative fluid balance    [id number] High mg/dL     BUN55 High mg/dL   Creatinine1.5 High      Pro-BNP1,876 High      INR1.6     Culture, Urine [[id number]] (Abnormal) Collected: 08/30/23 0500 Updated: 09/01/23 0728 Specimen Source: Urine, straight catheter Specimen Description. URINE,STRAIGHT CATHETER CultureESCHERICHIA COLI 50 ,000 CFU/ML Abnormal  ENTEROCOCCUS FAECALIS 10 to 50,000 CFU/ML Abnormal  PRESUMPTIVE ID: [id number] [address],   9/1/2023  11:59 AM

## 2023-09-02 LAB
MICROORGANISM SPEC CULT: ABNORMAL
SPECIMEN DESCRIPTION: ABNORMAL

## 2023-09-02 NOTE — DISCHARGE SUMMARY
[facility]  Office: [address], DO, Tracy Schmitt, DO, Akshat Velazquezex, DO, Gladys Cruz Blood, DO, Juliet Garcia MD, Yvrose Dodge MD, Esdras Smith MD, Ian Rose MD,  Ade Leiva MD, Chavez Thompson MD, Arcadio Ritchie, DO, Glo Bedolla MD,  Marisa Cordero MD, Lisa Page MD, Kishore Foley, DO, Mary Ann Vieyra MD,  Kika Jacinto, DO, Nelly Lee MD, Landy Goncalves MD, Shashank Spivey MD, Carloz Castano MD,  Garo Gonsalves MD, Kasandra Street MD, May Toussaint MD, Mihaela Fernandez, DO, Trevor Lopez MD,  Ml Lopez MD, Lupis Mcdonnell, CNP,  Vishal Mena, CNP,, Beau Heard, CNP,  Janel Vasquez, DNP, Elmer Keys, CNP, Madelaine Kelley, CNP, Shelby Marr, CNP, Treasure Garcia, CNP, Joi Brown, CNP, Judy Robert, CNP, Son Arreguin PA-C, Chiquita Limon, JOSEPH, Lopez Wright, CNP, [place], [address] [place] [address]    Discharge Summary    Patient ID: Madelaine Rubin  :  1955   MRN: [id number]     ACCOUNT:  [de-identified]   Patient's PCP: Noelle Espinosa Date: 2023   Discharge Date: 2023    Discharge Physician: Lynn Harris DO     The patient was seen and examined on day of discharge and this discharge summary is in conjunction with any daily progress note from day of discharge.     Active Discharge Diagnoses:     Primary Problem  Acute heart failure with preserved ejection fraction [facility])      [address] Problems    Diagnosis Date Noted    E. coli UTI [N39.0, B96.20] 2023    Steroid-induced hyperglycemia [R73.9, T38.0X5A] 2023    Acute heart failure with preserved ejection fraction (HCC) [I50.31] 2023    Acute on chronic

## 2024-10-10 ENCOUNTER — APPOINTMENT (OUTPATIENT)
Dept: GENERAL RADIOLOGY | Age: 69
DRG: 190 | End: 2024-10-10
Payer: MEDICARE

## 2024-10-10 ENCOUNTER — HOSPITAL ENCOUNTER (INPATIENT)
Age: 69
LOS: 6 days | Discharge: HOME HEALTH | DRG: 190 | End: 2024-10-16
Attending: EMERGENCY MEDICINE | Admitting: FAMILY MEDICINE
Payer: MEDICARE

## 2024-10-10 ENCOUNTER — APPOINTMENT (OUTPATIENT)
Dept: CT IMAGING | Age: 69
DRG: 190 | End: 2024-10-10
Payer: MEDICARE

## 2024-10-10 DIAGNOSIS — J18.9 PNEUMONIA OF RIGHT LOWER LOBE DUE TO INFECTIOUS ORGANISM: Primary | ICD-10-CM

## 2024-10-10 DIAGNOSIS — R41.82 ALTERED MENTAL STATUS, UNSPECIFIED ALTERED MENTAL STATUS TYPE: ICD-10-CM

## 2024-10-10 DIAGNOSIS — G47.33 OSA (OBSTRUCTIVE SLEEP APNEA): ICD-10-CM

## 2024-10-10 PROBLEM — J18.1 PNEUMONIA, LOBAR (HCC): Status: ACTIVE | Noted: 2024-10-10

## 2024-10-10 LAB
ALBUMIN SERPL-MCNC: 3 G/DL (ref 3.5–5.2)
ALBUMIN/GLOB SERPL: 0.6 {RATIO} (ref 1–2.5)
ALLEN TEST: ABNORMAL
ALLEN TEST: NEGATIVE
ALLEN TEST: POSITIVE
ALP SERPL-CCNC: 109 U/L (ref 35–104)
ALT SERPL-CCNC: 8 U/L (ref 5–33)
ANION GAP SERPL CALCULATED.3IONS-SCNC: 8 MMOL/L (ref 9–17)
AST SERPL-CCNC: 12 U/L
BACTERIA URNS QL MICRO: ABNORMAL
BASOPHILS # BLD: 0 K/UL (ref 0–0.2)
BASOPHILS NFR BLD: 0 % (ref 0–2)
BILIRUB DIRECT SERPL-MCNC: <0.1 MG/DL
BILIRUB INDIRECT SERPL-MCNC: ABNORMAL MG/DL (ref 0–1)
BILIRUB SERPL-MCNC: 0.2 MG/DL (ref 0.3–1.2)
BILIRUB UR QL STRIP: NEGATIVE
BUN SERPL-MCNC: 34 MG/DL (ref 8–23)
CALCIUM SERPL-MCNC: 9.4 MG/DL (ref 8.6–10.4)
CHARACTER UR: ABNORMAL
CHLORIDE SERPL-SCNC: 86 MMOL/L (ref 98–107)
CLARITY UR: ABNORMAL
CO2 SERPL-SCNC: 33 MMOL/L (ref 20–31)
COLOR UR: YELLOW
CREAT SERPL-MCNC: 1.3 MG/DL (ref 0.5–0.9)
CRITICAL ACTION: NORMAL
CRITICAL ACTION: NORMAL
CRITICAL NOTIFICATION DATE/TIME: NORMAL
CRITICAL NOTIFICATION DATE/TIME: NORMAL
CRITICAL NOTIFICATION: NORMAL
CRITICAL NOTIFICATION: NORMAL
CRITICAL VALUE READ BACK: YES
CRITICAL VALUE READ BACK: YES
EOSINOPHIL # BLD: 0.1 K/UL (ref 0–0.4)
EOSINOPHILS RELATIVE PERCENT: 1 % (ref 1–4)
EPI CELLS #/AREA URNS HPF: ABNORMAL /HPF (ref 0–5)
ERYTHROCYTE [DISTWIDTH] IN BLOOD BY AUTOMATED COUNT: 13.2 % (ref 12.5–15.4)
FIO2: 4
FIO2: 60
FIO2: 60
GFR, ESTIMATED: 44 ML/MIN/1.73M2
GLUCOSE SERPL-MCNC: 138 MG/DL (ref 70–99)
GLUCOSE UR STRIP-MCNC: NEGATIVE MG/DL
HCT VFR BLD AUTO: 31.8 % (ref 36–46)
HGB BLD-MCNC: 10.4 G/DL (ref 12–16)
HGB UR QL STRIP.AUTO: ABNORMAL
INR PPP: 3
KETONES UR STRIP-MCNC: NEGATIVE MG/DL
LACTATE BLDV-SCNC: 0.8 MMOL/L (ref 0.5–2.2)
LEUKOCYTE ESTERASE UR QL STRIP: ABNORMAL
LIPASE SERPL-CCNC: 20 U/L (ref 13–60)
LYMPHOCYTES NFR BLD: 0.8 K/UL (ref 1–4.8)
LYMPHOCYTES RELATIVE PERCENT: 7 % (ref 24–44)
MCH RBC QN AUTO: 33 PG (ref 26–34)
MCHC RBC AUTO-ENTMCNC: 32.7 G/DL (ref 31–37)
MCV RBC AUTO: 100.9 FL (ref 80–100)
MODE: ABNORMAL
MODE: ABNORMAL
MONOCYTES NFR BLD: 0.6 K/UL (ref 0.1–1.2)
MONOCYTES NFR BLD: 5 % (ref 2–11)
MUCOUS THREADS URNS QL MICRO: ABNORMAL
NEUTROPHILS NFR BLD: 87 % (ref 36–66)
NEUTS SEG NFR BLD: 9.6 K/UL (ref 1.8–7.7)
NITRITE UR QL STRIP: POSITIVE
O2 DELIVERY DEVICE: ABNORMAL
PH UR STRIP: 8.5 [PH] (ref 5–8)
PLATELET # BLD AUTO: 336 K/UL (ref 140–450)
PMV BLD AUTO: 7.3 FL (ref 6–12)
POC HCO3: 33.5 MMOL/L (ref 21–28)
POC HCO3: 39.3 MMOL/L (ref 21–28)
POC HCO3: 39.5 MMOL/L (ref 21–28)
POC O2 SATURATION: 57.2 % (ref 94–98)
POC O2 SATURATION: 81 % (ref 94–98)
POC O2 SATURATION: 99.7 % (ref 94–98)
POC PCO2: 101 MM HG (ref 35–48)
POC PCO2: 76 MM HG (ref 35–48)
POC PCO2: 77.1 MM HG (ref 35–48)
POC PH: 7.2 (ref 7.35–7.45)
POC PH: 7.25 (ref 7.35–7.45)
POC PH: 7.32 (ref 7.35–7.45)
POC PO2: 229 MM HG (ref 83–108)
POC PO2: 39 MM HG (ref 83–108)
POC PO2: 52 MM HG (ref 83–108)
POSITIVE BASE EXCESS, ART: 10.5 MMOL/L (ref 0–3)
POSITIVE BASE EXCESS, ART: 3.4 MMOL/L (ref 0–3)
POSITIVE BASE EXCESS, ART: 8.6 MMOL/L (ref 0–3)
POTASSIUM SERPL-SCNC: 5.4 MMOL/L (ref 3.7–5.3)
PROT SERPL-MCNC: 7.9 G/DL (ref 6.4–8.3)
PROT UR STRIP-MCNC: ABNORMAL MG/DL
PROTHROMBIN TIME: 29.4 SEC (ref 9.4–12.6)
RBC # BLD AUTO: 3.15 M/UL (ref 4–5.2)
RBC #/AREA URNS HPF: ABNORMAL /HPF (ref 0–2)
SAMPLE SITE: ABNORMAL
SODIUM SERPL-SCNC: 127 MMOL/L (ref 135–144)
SP GR UR STRIP: 1.01 (ref 1–1.03)
TROPONIN I SERPL HS-MCNC: 43 NG/L (ref 0–14)
UROBILINOGEN UR STRIP-ACNC: NORMAL EU/DL (ref 0–1)
WBC #/AREA URNS HPF: ABNORMAL /HPF (ref 0–5)
WBC OTHER # BLD: 11.2 K/UL (ref 3.5–11)

## 2024-10-10 PROCEDURE — 81001 URINALYSIS AUTO W/SCOPE: CPT

## 2024-10-10 PROCEDURE — 2580000003 HC RX 258: Performed by: NURSE PRACTITIONER

## 2024-10-10 PROCEDURE — 94761 N-INVAS EAR/PLS OXIMETRY MLT: CPT

## 2024-10-10 PROCEDURE — 71045 X-RAY EXAM CHEST 1 VIEW: CPT

## 2024-10-10 PROCEDURE — 84484 ASSAY OF TROPONIN QUANT: CPT

## 2024-10-10 PROCEDURE — 80048 BASIC METABOLIC PNL TOTAL CA: CPT

## 2024-10-10 PROCEDURE — 6360000002 HC RX W HCPCS: Performed by: NURSE PRACTITIONER

## 2024-10-10 PROCEDURE — 83605 ASSAY OF LACTIC ACID: CPT

## 2024-10-10 PROCEDURE — 87040 BLOOD CULTURE FOR BACTERIA: CPT

## 2024-10-10 PROCEDURE — 85610 PROTHROMBIN TIME: CPT

## 2024-10-10 PROCEDURE — 94660 CPAP INITIATION&MGMT: CPT

## 2024-10-10 PROCEDURE — 36600 WITHDRAWAL OF ARTERIAL BLOOD: CPT

## 2024-10-10 PROCEDURE — 83690 ASSAY OF LIPASE: CPT

## 2024-10-10 PROCEDURE — 5A09357 ASSISTANCE WITH RESPIRATORY VENTILATION, LESS THAN 24 CONSECUTIVE HOURS, CONTINUOUS POSITIVE AIRWAY PRESSURE: ICD-10-PCS | Performed by: NURSE PRACTITIONER

## 2024-10-10 PROCEDURE — 82803 BLOOD GASES ANY COMBINATION: CPT

## 2024-10-10 PROCEDURE — 85025 COMPLETE CBC W/AUTO DIFF WBC: CPT

## 2024-10-10 PROCEDURE — 36415 COLL VENOUS BLD VENIPUNCTURE: CPT

## 2024-10-10 PROCEDURE — 2000000000 HC ICU R&B

## 2024-10-10 PROCEDURE — 93005 ELECTROCARDIOGRAM TRACING: CPT | Performed by: NURSE PRACTITIONER

## 2024-10-10 PROCEDURE — 6370000000 HC RX 637 (ALT 250 FOR IP): Performed by: NURSE PRACTITIONER

## 2024-10-10 PROCEDURE — 99285 EMERGENCY DEPT VISIT HI MDM: CPT

## 2024-10-10 PROCEDURE — 2700000000 HC OXYGEN THERAPY PER DAY

## 2024-10-10 PROCEDURE — 94640 AIRWAY INHALATION TREATMENT: CPT

## 2024-10-10 PROCEDURE — 87086 URINE CULTURE/COLONY COUNT: CPT

## 2024-10-10 PROCEDURE — 70450 CT HEAD/BRAIN W/O DYE: CPT

## 2024-10-10 PROCEDURE — 80076 HEPATIC FUNCTION PANEL: CPT

## 2024-10-10 RX ORDER — IPRATROPIUM BROMIDE AND ALBUTEROL SULFATE 2.5; .5 MG/3ML; MG/3ML
1 SOLUTION RESPIRATORY (INHALATION) ONCE
Status: COMPLETED | OUTPATIENT
Start: 2024-10-10 | End: 2024-10-10

## 2024-10-10 RX ORDER — VENLAFAXINE HYDROCHLORIDE 150 MG/1
150 CAPSULE, EXTENDED RELEASE ORAL
Status: DISCONTINUED | OUTPATIENT
Start: 2024-10-10 | End: 2024-10-16 | Stop reason: HOSPADM

## 2024-10-10 RX ORDER — TORSEMIDE 20 MG/1
20 TABLET ORAL DAILY
Status: DISCONTINUED | OUTPATIENT
Start: 2024-10-10 | End: 2024-10-16 | Stop reason: HOSPADM

## 2024-10-10 RX ORDER — ENOXAPARIN SODIUM 100 MG/ML
30 INJECTION SUBCUTANEOUS 2 TIMES DAILY
Status: CANCELLED | OUTPATIENT
Start: 2024-10-10

## 2024-10-10 RX ORDER — WARFARIN SODIUM 5 MG/1
5 TABLET ORAL
Status: DISCONTINUED | OUTPATIENT
Start: 2024-10-13 | End: 2024-10-11

## 2024-10-10 RX ORDER — IPRATROPIUM BROMIDE AND ALBUTEROL SULFATE 2.5; .5 MG/3ML; MG/3ML
1 SOLUTION RESPIRATORY (INHALATION)
Status: DISCONTINUED | OUTPATIENT
Start: 2024-10-10 | End: 2024-10-10

## 2024-10-10 RX ORDER — ATORVASTATIN CALCIUM 40 MG/1
40 TABLET, FILM COATED ORAL DAILY
Status: DISCONTINUED | OUTPATIENT
Start: 2024-10-10 | End: 2024-10-16 | Stop reason: HOSPADM

## 2024-10-10 RX ORDER — METOPROLOL SUCCINATE 25 MG/1
25 TABLET, EXTENDED RELEASE ORAL DAILY
Status: DISCONTINUED | OUTPATIENT
Start: 2024-10-10 | End: 2024-10-13

## 2024-10-10 RX ORDER — 0.9 % SODIUM CHLORIDE 0.9 %
1000 INTRAVENOUS SOLUTION INTRAVENOUS ONCE
Status: COMPLETED | OUTPATIENT
Start: 2024-10-10 | End: 2024-10-11

## 2024-10-10 RX ORDER — WARFARIN SODIUM 2.5 MG/1
2.5 TABLET ORAL
Status: DISCONTINUED | OUTPATIENT
Start: 2024-10-11 | End: 2024-10-11

## 2024-10-10 RX ADMIN — IPRATROPIUM BROMIDE AND ALBUTEROL SULFATE 1 DOSE: 2.5; .5 SOLUTION RESPIRATORY (INHALATION) at 19:22

## 2024-10-10 RX ADMIN — Medication 1000 MG: at 20:42

## 2024-10-10 RX ADMIN — SODIUM CHLORIDE 1000 ML: 9 INJECTION, SOLUTION INTRAVENOUS at 20:50

## 2024-10-10 RX ADMIN — AZITHROMYCIN MONOHYDRATE 500 MG: 500 INJECTION, POWDER, LYOPHILIZED, FOR SOLUTION INTRAVENOUS at 20:49

## 2024-10-10 ASSESSMENT — PAIN - FUNCTIONAL ASSESSMENT
PAIN_FUNCTIONAL_ASSESSMENT: NONE - DENIES PAIN
PAIN_FUNCTIONAL_ASSESSMENT: NONE - DENIES PAIN

## 2024-10-11 ENCOUNTER — APPOINTMENT (OUTPATIENT)
Dept: CT IMAGING | Age: 69
DRG: 190 | End: 2024-10-11
Payer: MEDICARE

## 2024-10-11 PROBLEM — E44.0 MODERATE PROTEIN-CALORIE MALNUTRITION (HCC): Status: ACTIVE | Noted: 2024-10-11

## 2024-10-11 PROBLEM — Z78.9 NONSMOKER: Status: ACTIVE | Noted: 2024-10-11

## 2024-10-11 PROBLEM — I50.32 CHRONIC HEART FAILURE WITH PRESERVED EJECTION FRACTION (HCC): Status: ACTIVE | Noted: 2024-10-11

## 2024-10-11 PROBLEM — E87.5 HYPERKALEMIA: Status: ACTIVE | Noted: 2024-10-11

## 2024-10-11 PROBLEM — Z77.22 EXPOSURE TO SECOND HAND SMOKE: Status: ACTIVE | Noted: 2024-10-11

## 2024-10-11 PROBLEM — E87.1 HYPONATREMIA: Status: ACTIVE | Noted: 2024-10-11

## 2024-10-11 PROBLEM — D72.825 BANDEMIA: Status: ACTIVE | Noted: 2024-10-11

## 2024-10-11 PROBLEM — J18.9 PNEUMONIA OF RIGHT LOWER LOBE DUE TO INFECTIOUS ORGANISM: Status: ACTIVE | Noted: 2024-10-11

## 2024-10-11 LAB
ALLEN TEST: ABNORMAL
ANION GAP SERPL CALCULATED.3IONS-SCNC: 10 MMOL/L (ref 9–17)
ANION GAP SERPL CALCULATED.3IONS-SCNC: 11 MMOL/L (ref 9–17)
ANION GAP SERPL CALCULATED.3IONS-SCNC: 5 MMOL/L (ref 9–17)
BASOPHILS # BLD: 0 K/UL (ref 0–0.2)
BASOPHILS NFR BLD: 0 % (ref 0–2)
BUN SERPL-MCNC: 28 MG/DL (ref 8–23)
BUN SERPL-MCNC: 28 MG/DL (ref 8–23)
BUN SERPL-MCNC: 31 MG/DL (ref 8–23)
CALCIUM SERPL-MCNC: 9 MG/DL (ref 8.6–10.4)
CALCIUM SERPL-MCNC: 9.2 MG/DL (ref 8.6–10.4)
CALCIUM SERPL-MCNC: 9.5 MG/DL (ref 8.6–10.4)
CHLORIDE SERPL-SCNC: 93 MMOL/L (ref 98–107)
CHLORIDE SERPL-SCNC: 93 MMOL/L (ref 98–107)
CHLORIDE SERPL-SCNC: 94 MMOL/L (ref 98–107)
CO2 SERPL-SCNC: 28 MMOL/L (ref 20–31)
CO2 SERPL-SCNC: 30 MMOL/L (ref 20–31)
CO2 SERPL-SCNC: 34 MMOL/L (ref 20–31)
CREAT SERPL-MCNC: 1 MG/DL (ref 0.5–0.9)
CREAT SERPL-MCNC: 1.1 MG/DL (ref 0.5–0.9)
CREAT SERPL-MCNC: 1.1 MG/DL (ref 0.5–0.9)
EKG ATRIAL RATE: 83 BPM
EKG P AXIS: 60 DEGREES
EKG P-R INTERVAL: 206 MS
EKG Q-T INTERVAL: 348 MS
EKG QRS DURATION: 84 MS
EKG QTC CALCULATION (BAZETT): 408 MS
EKG R AXIS: 57 DEGREES
EKG T AXIS: 52 DEGREES
EKG VENTRICULAR RATE: 83 BPM
EOSINOPHIL # BLD: 0.1 K/UL (ref 0–0.4)
EOSINOPHILS RELATIVE PERCENT: 1 % (ref 1–4)
ERYTHROCYTE [DISTWIDTH] IN BLOOD BY AUTOMATED COUNT: 13.6 % (ref 12.5–15.4)
FIO2: 45
FOLATE SERPL-MCNC: >20 NG/ML (ref 4.8–24.2)
GFR, ESTIMATED: 54 ML/MIN/1.73M2
GFR, ESTIMATED: 54 ML/MIN/1.73M2
GFR, ESTIMATED: 60 ML/MIN/1.73M2
GLUCOSE SERPL-MCNC: 124 MG/DL (ref 70–99)
GLUCOSE SERPL-MCNC: 148 MG/DL (ref 70–99)
GLUCOSE SERPL-MCNC: 163 MG/DL (ref 70–99)
HCT VFR BLD AUTO: 33.3 % (ref 36–46)
HGB BLD-MCNC: 11 G/DL (ref 12–16)
INR PPP: 4.2
L PNEUMO1 AG UR QL IA.RAPID: NOT DETECTED
LYMPHOCYTES NFR BLD: 0.6 K/UL (ref 1–4.8)
LYMPHOCYTES RELATIVE PERCENT: 5 % (ref 24–44)
MCH RBC QN AUTO: 33.4 PG (ref 26–34)
MCHC RBC AUTO-ENTMCNC: 33 G/DL (ref 31–37)
MCV RBC AUTO: 101.2 FL (ref 80–100)
MODE: ABNORMAL
MONOCYTES NFR BLD: 0.5 K/UL (ref 0.1–1.2)
MONOCYTES NFR BLD: 4 % (ref 2–11)
NEUTROPHILS NFR BLD: 90 % (ref 36–66)
NEUTS SEG NFR BLD: 10.5 K/UL (ref 1.8–7.7)
O2 DELIVERY DEVICE: ABNORMAL
PLATELET # BLD AUTO: 295 K/UL (ref 140–450)
PMV BLD AUTO: 6.9 FL (ref 6–12)
POC HCO3: 34.4 MMOL/L (ref 21–28)
POC O2 SATURATION: 94.4 % (ref 94–98)
POC PCO2: 55.5 MM HG (ref 35–48)
POC PH: 7.4 (ref 7.35–7.45)
POC PO2: 74.8 MM HG (ref 83–108)
POSITIVE BASE EXCESS, ART: 8.1 MMOL/L (ref 0–3)
POTASSIUM SERPL-SCNC: 4.6 MMOL/L (ref 3.7–5.3)
POTASSIUM SERPL-SCNC: 4.7 MMOL/L (ref 3.7–5.3)
POTASSIUM SERPL-SCNC: 5 MMOL/L (ref 3.7–5.3)
PROCALCITONIN SERPL-MCNC: 0.25 NG/ML (ref 0–0.09)
PROTHROMBIN TIME: 40.3 SEC (ref 9.4–12.6)
RBC # BLD AUTO: 3.29 M/UL (ref 4–5.2)
SAMPLE SITE: ABNORMAL
SARS-COV-2 RDRP RESP QL NAA+PROBE: NOT DETECTED
SODIUM SERPL-SCNC: 132 MMOL/L (ref 135–144)
SODIUM SERPL-SCNC: 132 MMOL/L (ref 135–144)
SODIUM SERPL-SCNC: 134 MMOL/L (ref 135–144)
SPECIMEN DESCRIPTION: NORMAL
TROPONIN I SERPL HS-MCNC: 40 NG/L (ref 0–14)
VIT B12 SERPL-MCNC: 1100 PG/ML (ref 232–1245)
WBC OTHER # BLD: 11.7 K/UL (ref 3.5–11)

## 2024-10-11 PROCEDURE — 80048 BASIC METABOLIC PNL TOTAL CA: CPT

## 2024-10-11 PROCEDURE — 82803 BLOOD GASES ANY COMBINATION: CPT

## 2024-10-11 PROCEDURE — 2000000000 HC ICU R&B

## 2024-10-11 PROCEDURE — 85025 COMPLETE CBC W/AUTO DIFF WBC: CPT

## 2024-10-11 PROCEDURE — 71250 CT THORAX DX C-: CPT

## 2024-10-11 PROCEDURE — 94660 CPAP INITIATION&MGMT: CPT

## 2024-10-11 PROCEDURE — 6370000000 HC RX 637 (ALT 250 FOR IP)

## 2024-10-11 PROCEDURE — 93010 ELECTROCARDIOGRAM REPORT: CPT | Performed by: INTERNAL MEDICINE

## 2024-10-11 PROCEDURE — 97530 THERAPEUTIC ACTIVITIES: CPT

## 2024-10-11 PROCEDURE — 36600 WITHDRAWAL OF ARTERIAL BLOOD: CPT

## 2024-10-11 PROCEDURE — 85610 PROTHROMBIN TIME: CPT

## 2024-10-11 PROCEDURE — 82607 VITAMIN B-12: CPT

## 2024-10-11 PROCEDURE — 94761 N-INVAS EAR/PLS OXIMETRY MLT: CPT

## 2024-10-11 PROCEDURE — 97167 OT EVAL HIGH COMPLEX 60 MIN: CPT

## 2024-10-11 PROCEDURE — 36415 COLL VENOUS BLD VENIPUNCTURE: CPT

## 2024-10-11 PROCEDURE — 97163 PT EVAL HIGH COMPLEX 45 MIN: CPT

## 2024-10-11 PROCEDURE — 2580000003 HC RX 258: Performed by: INTERNAL MEDICINE

## 2024-10-11 PROCEDURE — 6360000002 HC RX W HCPCS: Performed by: INTERNAL MEDICINE

## 2024-10-11 PROCEDURE — 84145 PROCALCITONIN (PCT): CPT

## 2024-10-11 PROCEDURE — 86738 MYCOPLASMA ANTIBODY: CPT

## 2024-10-11 PROCEDURE — 84484 ASSAY OF TROPONIN QUANT: CPT

## 2024-10-11 PROCEDURE — 2580000003 HC RX 258

## 2024-10-11 PROCEDURE — 2700000000 HC OXYGEN THERAPY PER DAY

## 2024-10-11 PROCEDURE — 82746 ASSAY OF FOLIC ACID SERUM: CPT

## 2024-10-11 PROCEDURE — 87449 NOS EACH ORGANISM AG IA: CPT

## 2024-10-11 PROCEDURE — 87635 SARS-COV-2 COVID-19 AMP PRB: CPT

## 2024-10-11 PROCEDURE — 97535 SELF CARE MNGMENT TRAINING: CPT

## 2024-10-11 PROCEDURE — 99222 1ST HOSP IP/OBS MODERATE 55: CPT | Performed by: FAMILY MEDICINE

## 2024-10-11 PROCEDURE — 94640 AIRWAY INHALATION TREATMENT: CPT

## 2024-10-11 RX ORDER — SODIUM CHLORIDE 9 MG/ML
INJECTION, SOLUTION INTRAVENOUS PRN
Status: DISCONTINUED | OUTPATIENT
Start: 2024-10-11 | End: 2024-10-16 | Stop reason: HOSPADM

## 2024-10-11 RX ORDER — ONDANSETRON 4 MG/1
4 TABLET, ORALLY DISINTEGRATING ORAL EVERY 8 HOURS PRN
Status: DISCONTINUED | OUTPATIENT
Start: 2024-10-11 | End: 2024-10-16 | Stop reason: HOSPADM

## 2024-10-11 RX ORDER — SODIUM CHLORIDE 9 MG/ML
INJECTION, SOLUTION INTRAVENOUS CONTINUOUS
Status: DISCONTINUED | OUTPATIENT
Start: 2024-10-11 | End: 2024-10-11

## 2024-10-11 RX ORDER — IPRATROPIUM BROMIDE AND ALBUTEROL SULFATE 2.5; .5 MG/3ML; MG/3ML
1 SOLUTION RESPIRATORY (INHALATION)
Status: DISCONTINUED | OUTPATIENT
Start: 2024-10-11 | End: 2024-10-11

## 2024-10-11 RX ORDER — BENZONATATE 100 MG/1
100 CAPSULE ORAL 3 TIMES DAILY PRN
Status: DISCONTINUED | OUTPATIENT
Start: 2024-10-11 | End: 2024-10-16 | Stop reason: HOSPADM

## 2024-10-11 RX ORDER — ACETAMINOPHEN 650 MG/1
650 SUPPOSITORY RECTAL EVERY 6 HOURS PRN
Status: DISCONTINUED | OUTPATIENT
Start: 2024-10-11 | End: 2024-10-16 | Stop reason: HOSPADM

## 2024-10-11 RX ORDER — SODIUM CHLORIDE 0.9 % (FLUSH) 0.9 %
5-40 SYRINGE (ML) INJECTION EVERY 12 HOURS SCHEDULED
Status: DISCONTINUED | OUTPATIENT
Start: 2024-10-11 | End: 2024-10-16 | Stop reason: HOSPADM

## 2024-10-11 RX ORDER — ALBUTEROL SULFATE 0.83 MG/ML
2.5 SOLUTION RESPIRATORY (INHALATION) EVERY 4 HOURS PRN
Status: DISCONTINUED | OUTPATIENT
Start: 2024-10-11 | End: 2024-10-12

## 2024-10-11 RX ORDER — SODIUM CHLORIDE 0.9 % (FLUSH) 0.9 %
5-40 SYRINGE (ML) INJECTION PRN
Status: DISCONTINUED | OUTPATIENT
Start: 2024-10-11 | End: 2024-10-16 | Stop reason: HOSPADM

## 2024-10-11 RX ORDER — AZITHROMYCIN 250 MG/1
500 TABLET, FILM COATED ORAL EVERY 24 HOURS
Status: DISCONTINUED | OUTPATIENT
Start: 2024-10-11 | End: 2024-10-12

## 2024-10-11 RX ORDER — ONDANSETRON 2 MG/ML
4 INJECTION INTRAMUSCULAR; INTRAVENOUS EVERY 6 HOURS PRN
Status: DISCONTINUED | OUTPATIENT
Start: 2024-10-11 | End: 2024-10-16 | Stop reason: HOSPADM

## 2024-10-11 RX ORDER — ACETAMINOPHEN 325 MG/1
650 TABLET ORAL EVERY 6 HOURS PRN
Status: DISCONTINUED | OUTPATIENT
Start: 2024-10-11 | End: 2024-10-16 | Stop reason: HOSPADM

## 2024-10-11 RX ORDER — POLYETHYLENE GLYCOL 3350 17 G/17G
17 POWDER, FOR SOLUTION ORAL DAILY PRN
Status: DISCONTINUED | OUTPATIENT
Start: 2024-10-11 | End: 2024-10-16 | Stop reason: HOSPADM

## 2024-10-11 RX ADMIN — CEFEPIME 2000 MG: 2 INJECTION, POWDER, FOR SOLUTION INTRAVENOUS at 20:45

## 2024-10-11 RX ADMIN — EMPAGLIFLOZIN 10 MG: 10 TABLET, FILM COATED ORAL at 09:51

## 2024-10-11 RX ADMIN — TORSEMIDE 20 MG: 20 TABLET ORAL at 09:51

## 2024-10-11 RX ADMIN — METOPROLOL SUCCINATE 25 MG: 25 TABLET, EXTENDED RELEASE ORAL at 09:51

## 2024-10-11 RX ADMIN — VENLAFAXINE HYDROCHLORIDE 150 MG: 150 CAPSULE, EXTENDED RELEASE ORAL at 09:51

## 2024-10-11 RX ADMIN — SODIUM CHLORIDE: 9 INJECTION, SOLUTION INTRAVENOUS at 08:09

## 2024-10-11 RX ADMIN — IPRATROPIUM BROMIDE AND ALBUTEROL SULFATE 1 DOSE: .5; 2.5 SOLUTION RESPIRATORY (INHALATION) at 07:47

## 2024-10-11 RX ADMIN — SODIUM CHLORIDE, PRESERVATIVE FREE 10 ML: 5 INJECTION INTRAVENOUS at 09:51

## 2024-10-11 RX ADMIN — SODIUM CHLORIDE, PRESERVATIVE FREE 10 ML: 5 INJECTION INTRAVENOUS at 20:43

## 2024-10-11 RX ADMIN — SODIUM CHLORIDE: 9 INJECTION, SOLUTION INTRAVENOUS at 01:03

## 2024-10-11 RX ADMIN — IPRATROPIUM BROMIDE AND ALBUTEROL SULFATE 1 DOSE: .5; 2.5 SOLUTION RESPIRATORY (INHALATION) at 11:46

## 2024-10-11 RX ADMIN — AZITHROMYCIN DIHYDRATE 500 MG: 250 TABLET, FILM COATED ORAL at 20:41

## 2024-10-11 RX ADMIN — ATORVASTATIN CALCIUM 40 MG: 40 TABLET, FILM COATED ORAL at 09:51

## 2024-10-11 NOTE — RT PROTOCOL NOTE
RT Inhaler-Nebulizer Bronchodilator Protocol Note    There is a bronchodilator order in the chart from a provider indicating to follow the RT Bronchodilator Protocol and there is an “Initiate RT Inhaler-Nebulizer Bronchodilator Protocol” order as well (see protocol at bottom of note).    CXR Findings:  XR CHEST PORTABLE    Result Date: 10/10/2024  Right lung airspace opacities compatible with pneumonia.       The findings from the last RT Protocol Assessment were as follows:   History Pulmonary Disease: Chronic pulmonary disease  Respiratory Pattern: Mild dyspnea at rest, irregular pattern, or RR 21-25 bpm  Breath Sounds: Slightly diminished and/or crackles  Cough: Unable to generate effective cough  Indication for Bronchodilator Therapy:    Bronchodilator Assessment Score: 12    Aerosolized bronchodilator medication orders have been revised according to the RT Inhaler-Nebulizer Bronchodilator Protocol below.    Respiratory Therapist to perform RT Therapy Protocol Assessment initially then follow the protocol.  Repeat RT Therapy Protocol Assessment PRN for score 0-3 or on second treatment, BID, and PRN for scores above 3.    No Indications - adjust the frequency to every 6 hours PRN wheezing or bronchospasm, if no treatments needed after 48 hours then discontinue using Per Protocol order mode.     If indication present, adjust the RT bronchodilator orders based on the Bronchodilator Assessment Score as indicated below.  Use Inhaler orders unless patient has one or more of the following: on home nebulizer, not able to hold breath for 10 seconds, is not alert and oriented, cannot activate and use MDI correctly, or respiratory rate 25 breaths per minute or more, then use the equivalent nebulizer order(s) with same Frequency and PRN reasons based on the score.  If a patient is on this medication at home then do not decrease Frequency below that used at home.    0-3 - enter or revise RT bronchodilator order(s) to  83.9

## 2024-10-11 NOTE — PROGRESS NOTES
Pharmacy Note  Warfarin Consult    Yari Stokes is a 69 y.o. female for whom pharmacy has been consulted to manage warfarin therapy.     Consulting Physician: Shaun  Reason for Admission: altered mental status    Warfarin dose prior to admission: 2.5 mg alternate with 5 mg  Warfarin indication: Valve  Target INR range: 2.5-3.5     Past Medical History:   Diagnosis Date    Anemia     Apnea, sleep     CAD (coronary artery disease)     CHF (congestive heart failure) (HCC)     Chronic kidney disease     COPD (chronic obstructive pulmonary disease) (HCC)     Depression     Diabetes mellitus (HCC)     Heart abnormality     Hyperlipidemia     Hyponatremia     Incontinence of urine     Obesity     SIAD (syndrome of inappropriate antidiuresis) ([facility])     Tinea corporis     Vitamin D insufficiency                 Recent Labs     10/10/24  1826   INR 3.0     Recent Labs     10/10/24  1826   HGB 10.4*   HCT 31.8*                Date             INR        Dose   10/10/2024            3.0       Start on 10/11 patient unaware if needed  .    Thank you for the consult.  Will continue to follow.  Vidal Jimenez Pharm D.  10/10/2024  9:41 PM

## 2024-10-11 NOTE — PROGRESS NOTES
Occupational Therapy  Facility/Department: [address]   Occupational Therapy Initial Evaluation    Patient Name: Yari Stokes        MRN: [id number]    : 1955    Date of Service: 10/11/2024    Discharge Recommendations  Discharge Recommendations: Patient would benefit from continued therapy after discharge    OT Equipment Recommendations  Other: AE/DME recommnedations TBD    Chief Complaint   Patient presents with    Altered Mental Status     Past Medical History:  has a past medical history of Anemia, Apnea, sleep, CAD (coronary artery disease), CHF (congestive heart failure) ([facility]), Chronic kidney disease, COPD (chronic obstructive pulmonary disease) ([facility]), Depression, Diabetes mellitus ([facility]), Heart abnormality, Hyperlipidemia, Hyponatremia, Incontinence of urine, Obesity, SIAD (syndrome of inappropriate antidiuresis) ([facility]), Tinea corporis, and Vitamin D insufficiency.  Past Surgical History:  has a past surgical history that includes lumbar laminectomy and cervical laminectomy.    Assessment  Performance deficits / Impairments: Decreased functional mobility ;Decreased balance;Decreased ADL status;Decreased posture;Decreased endurance;Decreased strength;Decreased ROM;Decreased high-level IADLs;Decreased safe awareness;Decreased coordination;Decreased cognition    Assessment: Pt seen for therapy eval s/p admission with acute respiratory failure with hypoxia and hypercarbia. PLOF includes living with spouse, daily assist for most ADLs and all IADLs, non-ambulatory at baseline. Pt presents with decreased ADL status secondary to above noted deficits. At this time, pt is MIN A x2 sup>sit, MAX A x2 sit<>stand with freda steady, DEP x2 sit>supine, DEP LB dress/toileting and does not demonstrate the functional ability to safely return to prior living arrangements. Pt to benefit from continued therapy services while hospitalized and following discharge to maximize pt's safety and independence in performing

## 2024-10-11 NOTE — ED NOTES
ED to inpatient nurses report      Chief Complaint:  Chief Complaint   Patient presents with    Altered Mental Status     Present to ED from: home      MOA:     LOC: alert and orientated to name, place, date  Mobility: Fully dependent  Oxygen Baseline: 3L if needed at night    Current needs required: bipap   Pending ED orders: home meds reordered  Present condition: stable:     Why did the patient come to the ED? Altered mental status. Per pt  report, pt increased confusion.   states happens when pt has UTI   What is the plan? Antibiotics for pna   Any procedures or intervention occur? Bipap and repeat ABG in 1 hr.  Respiratory states difficult to obtain abg.  Dr Smallwood notified of repeat ABG  Any safety concerns??monitoring labs  CODE STATUS Prior  Diet Diet NPO    Mental Status:  Level of Consciousness: Responds to voice (1)    Psych Assessment:   Psychosocial  Psychosocial (WDL): Within Defined Limits  Vital signs   Vitals:    10/10/24 1922 10/10/24 1932 10/10/24 2042 10/10/24 2100   BP:  (!) 106/50 117/66 (!) 120/97   Pulse: (!) 103 83 98 (!) 103   Resp: 18 30 22 20   Temp:       TempSrc:       SpO2: 93% 97%  93%   Weight:  105.7 kg (233 lb)     Height:  1.651 m (5' 5\")          Vitals:  Patient Vitals for the past 24 hrs:   BP Temp Temp src Pulse Resp SpO2 Height Weight   10/10/24 2100 (!) 120/97 -- -- (!) 103 20 93 % -- --   10/10/24 2042 117/66 -- -- 98 22 -- -- --   10/10/24 1932 (!) 106/50 -- -- 83 30 97 % 1.651 m (5' 5\") 105.7 kg (233 lb)   10/10/24 1922 -- -- -- (!) 103 18 93 % -- --   10/10/24 1806 (!) 88/42 98.1 °F (36.7 °C) Oral (!) 101 20 (!) 83 % -- 106 kg (233 lb 11 oz)      Visit Vitals  BP (!) 120/97   Pulse (!) 103   Temp 98.1 °F (36.7 °C) (Oral)   Resp 20   Ht 1.651 m (5' 5\")   Wt 105.7 kg (233 lb)   SpO2 93%   BMI 38.77 kg/m²        LDAs:   Peripheral IV 10/10/24 Left Forearm (Active)   Site Assessment Clean, dry & intact;Clean;Dry;Intact 10/10/24 1820   Line Status Blood

## 2024-10-11 NOTE — PLAN OF CARE
Problem: Safety - Adult  Goal: Free from fall injury  Outcome: Progressing     Problem: Chronic Conditions and Co-morbidities  Goal: Patient's chronic conditions and co-morbidity symptoms are monitored and maintained or improved  Outcome: Progressing     Problem: Discharge Planning  Goal: Discharge to home or other facility with appropriate resources  Outcome: Progressing     Problem: Respiratory - Adult  Goal: Achieves optimal ventilation and oxygenation  Outcome: Progressing     Problem: Gastrointestinal - Adult  Goal: Maintains or returns to baseline bowel function  Outcome: Progressing  Goal: Maintains adequate nutritional intake  Outcome: Progressing     Problem: Metabolic/Fluid and Electrolytes - Adult  Goal: Electrolytes maintained within normal limits  Outcome: Progressing  Goal: Hemodynamic stability and optimal renal function maintained  Outcome: Progressing     Problem: Skin/Tissue Integrity - Adult  Goal: Skin integrity remains intact  Outcome: Progressing

## 2024-10-11 NOTE — PROGRESS NOTES
10/11/24 0210   NIV Type   $NIV $Daily Charge   NIV Started/Stopped On   Equipment Type V60   Mode AVAPS   Mask Type Full face mask   Mask Size Medium   Assessment   Pulse 75   Respirations 21   SpO2 97 %   Level of Consciousness 0   Comfort Level Good   Using Accessory Muscles No   Mask Compliance Good   Settings/Measurements   PIP Observed 25 cm H20   CPAP/EPAP 10 cmH2O   IPAP Min 15 cmH2O   IPAP Max 25 cmH2O   Vt (Set, mL) 450 mL   Vt (Measured) 373 mL   Rate Ordered 10   Insp Rise Time (%) 3 %   FiO2  45 %   I Time/ I Time % 1 s   Minute Volume (L/min) 6.9 Liters   Mask Leak (lpm) 0 lpm   Patient's Home Machine No   Alarm Settings   Alarms On Y   Low Pressure (cmH2O) 5 cmH2O   High Pressure (cmH2O) 32 cmH2O   Apnea (secs) 20 secs   RR Low (bpm) 8   RR High (bpm) 40 br/min     Very difficult ABG puncture- RB with use of Doppler

## 2024-10-11 NOTE — PROGRESS NOTES
[facility] Residency  Inpatient Service     Progress Note  10/11/2024    2:51 PM    Name:   Yari Stokes  MRN:     [id number]     Acct:      [id number]   Room:   [address] [place] [address] Day:  1  Admit Date:  10/10/2024  6:05 PM    PCP:   Tay Angel DO  Code Status:  Full Code    Subjective:     Overnight events: No significant events.    Pt was examined at bedside. Pt was on BIPAP. Patient was initially aroused and endorsed improving breathing. She denied chest pain and headaches Then, patient fell asleep and was not responsive to further questions.    Medications:     Allergies:    Allergies   Allergen Reactions    Diphenhydramine-Phenylephrine Rash    Sudafed [Pseudoephedrine]     Wellbutrin [Bupropion]        Current Meds:   Scheduled Meds:    sodium chloride flush  5-40 mL IntraVENous 2 times per day    ipratropium 0.5 mg-albuterol 2.5 mg  1 Dose Inhalation Q4H WA RT    cefTRIAXone (ROCEPHIN) IV  1,000 mg IntraVENous Q24H    And    azithromycin  500 mg Oral Q24H    atorvastatin  40 mg Oral Daily    empagliflozin  10 mg Oral Daily    metoprolol succinate  25 mg Oral Daily    torsemide  20 mg Oral Daily    venlafaxine  150 mg Oral Daily with breakfast    warfarin placeholder: dosing by pharmacy   Other RX Placeholder     Continuous Infusions:    sodium chloride       PRN Meds: sodium chloride flush, sodium chloride, ondansetron **OR** ondansetron, polyethylene glycol, acetaminophen **OR** acetaminophen, benzonatate    ROS:   ROS is negative except for points noted above.    Data:     Vitals:  /62   Pulse (!) 120   Temp 97.4 °F (36.3 °C) (Axillary)   Resp 22   Ht 1.651 m (5' 5\")   Wt 96 kg (211 lb 10.3 oz)   LMP  (LMP Unknown)   SpO2 93%   BMI 35.22 kg/m²   Temp (24hrs), Av.5 °F (36.4 °C), Min:97 °F (36.1 °C), Max:98.1 °F (36.7 °C)    No results for input(s): \"POCGLU\" in the last 72 hours.    I/O (24Hr):    Intake/Output Summary (Last 24 hours)  regular rhythm.      Comments: Dorsalis pedis pulses were 2+  Pulmonary:      Effort: No respiratory distress.      Breath sounds: Decreased air movement present. Decreased breath sounds present. No rhonchi or rales.      Comments: Mouth breathing and low pitched snore-like sounds while on BiPAP were noted upon inspection  No peripheral cyanosis noted  Musculoskeletal:      Right lower leg: No edema.      Left lower leg: No edema.      Comments: No pitting edema in lower extremities bilaterally   Skin:     General: Skin is warm.      Capillary Refill: Capillary refill takes less than 2 seconds.      Findings: No rash.   Neurological:      Comments: Patient appeared to have fatigue/sleepiness       Assessment:     Hospital Problems             Last Modified POA    * (Principal) Acute respiratory failure with hypoxia and hypercarbia 10/11/2024 Yes    Acute cystitis without hematuria 10/11/2024 Yes    Anemia, macrocytic 10/11/2024 Yes    Troponin level elevated 10/11/2024 Yes    Ambulatory dysfunction 10/11/2024 Yes    History of pulmonary embolism 10/11/2024 Yes    Chronic anticoagulation 10/11/2024 Yes    Stage 3b chronic kidney disease (CKD) ([facility]) 10/11/2024 Yes    Chronic obstructive pulmonary disease (HCC) 10/11/2024 Yes    Pneumonia, lobar (HCC) 10/11/2024 Yes    Moderate protein-calorie malnutrition (HCC) 10/11/2024 Yes    Bandemia 10/11/2024 Yes    Nonsmoker 10/11/2024 Yes    Exposure to second hand smoke 10/11/2024 Yes    Chronic heart failure with preserved ejection fraction (HCC) 10/11/2024 Yes    Hyponatremia 10/11/2024 Yes    Hyperkalemia 10/11/2024 Yes       Plan:    Acute respiratory failure with hypoxia and hypercarbia  Pulmonary consulted; will follow-up on recommendations  Placed on NIV- AVAPS  Serial ABGS  PT/OT consulted  Consider Continuous pulse ox   Continue Cardiac telemetry   Continue Duonebs with RT   Procal 0.25  Tessalon Pearles PRN   ECHO done in 8/2023  Consider starting Solu-Medrol if no

## 2024-10-11 NOTE — H&P
work or get the copy from home    Anticoagulation: Warfarin     Brief Disposition Planning    What setting did patient present to hospital from? Home  Has PT/OT been consulted to further assess patient's therapy needs at discharge and/or appropriate next level of care? Yes  Does patient have a healthcare DPOA? No, however patient has stated they want MARYLU ACUÑA (Spouse) to be a decision maker if necessary. Their phone number is [phone] .  Does patient have a living will? Yes  Does patient have clear decision making capacity on admission? No  Based on limited initial assessment, is it anticipated that patient may have continued care needs at discharge?  No  Care after discharge was briefly discussed with patient, and at this time they anticipate the following needs at discharge: Home with no additional care needs       Patient is admitted as inpatient status because of co-morbidities listed above, severity of signs and symptoms as outlined, requirement for current medical therapies and most importantly because of direct risk to patient if care not provided in a hospital setting.  Expected length of stay > 48 hours. Patient is admitted in the Progressive Unit/Step down    Patient was discussed with MD Balbir Collier MD  Family Medicine Resident, PGY-3   10/10/2024  8:21 PM    Copy sent to Tay Matias,

## 2024-10-11 NOTE — CARE COORDINATION
Case Management Assessment  Initial Evaluation    Date/Time of Evaluation: 10/11/2024 6:19 PM  Assessment Completed by: Archana Zapata RN    If patient is discharged prior to next notation, then this note serves as note for discharge by case management.    Patient Name: Yari Acuña                   YOB: 1955  Diagnosis: Pneumonia, lobar (HCC) [J18.1]  Altered mental status, unspecified altered mental status type [R41.82]  Pneumonia of right lower lobe due to infectious organism [J18.9]                   Date / Time: 10/10/2024  6:05 PM    Patient Admission Status: Inpatient   Readmission Risk (Low < 19, Mod (19-27), High > 27): Readmission Risk Score: 14.9    Current PCP: Tay Angel, DO  PCP verified by CM? Yes    Chart Reviewed: Yes      History Provided by: Patient  Patient Orientation: Alert and Oriented, Person, Place, Situation    Patient Cognition: Alert    Hospitalization in the last 30 days (Readmission):  No    If yes, Readmission Assessment in  Navigator will be completed.    Advance Directives:      Code Status: Full Code   Patient's Primary Decision Maker is: Legal Next of Kin    Primary Decision Maker: MARYLU ACUÑA - Spouse - [phone]    Discharge Planning:    Patient lives with: Spouse/Significant Other Type of Home: Apartment  Primary Care Giver: Spouse  Patient Support Systems include: Spouse/Significant Other, Children   Current Financial resources: Medicare  Current community resources: None  Current services prior to admission: Durable Medical Equipment            Current DME: Other (Comment), Oxygen Therapy (Comment) (Motorized Chair, Joanna Stedy, lift Chair)            Type of Home Care services:  PT, OT    ADLS  Prior functional level: Assistance with the following:, Bathing, Dressing, Cooking, Housework, Shopping, Mobility  Current functional level: Assistance with the following:, Bathing, Dressing, Cooking, Shopping, Mobility, Housework    PT AM-PAC: 7

## 2024-10-11 NOTE — ED PROVIDER NOTES
[facility] EMERGENCY DEPARTMENT  EMERGENCY DEPARTMENT ENCOUNTER      Pt Name: Yari Stokes  MRN: [id number]  Birthdate 1955  Date of evaluation: 10/10/2024  Provider: AG Bacon CNP  8:30 PM    CHIEF COMPLAINT       Chief Complaint   Patient presents with    Altered Mental Status         HISTORY OF PRESENT ILLNESS    Yari Stokes is a 69 y.o. female who presents to the emergency department for evaluation of altered mental status over the past 2 days according to the .  Patient has a history of spinal stenosis and after surgery she was not ever able to ambulate properly.  She is wheelchair-bound currently.  She tells me she has been weak.  She does converse with me however she is hypotensive tachycardic and hypoxic.  Immediately she is placed on oxygen.  She is able to carry on a conversation she does tell me she has a history of COPD she reports some nausea and is complaining of back pain but she states that is not new.  She is able to tell me that she is in the hospital.  She does not really know why.    HPI    Nursing Notes were reviewed.    REVIEW OF SYSTEMS       Review of Systems   Unable to perform ROS: Mental status change       Except as noted above the remainder of the review of systems was reviewed and negative.       PAST MEDICAL HISTORY     Past Medical History:   Diagnosis Date    Anemia     Apnea, sleep     CAD (coronary artery disease)     CHF (congestive heart failure) ([facility])     Chronic kidney disease     COPD (chronic obstructive pulmonary disease) ([facility])     Depression     Diabetes mellitus ([facility])     Heart abnormality     Hyperlipidemia     Hyponatremia     Incontinence of urine     Obesity     SIAD (syndrome of inappropriate antidiuresis) ([facility])     Tinea corporis     Vitamin D insufficiency          SURGICAL HISTORY       Past Surgical History:   Procedure Laterality Date    CERVICAL LAMINECTOMY      LUMBAR LAMINECTOMY           CURRENT MEDICATIONS

## 2024-10-11 NOTE — PROGRESS NOTES
Physical Therapy  Facility/Department: [address]  Physical Therapy Initial Assessment    Name: Yari Stokes  : 1955  MRN: [id number]  Date of Service: 10/11/2024  Chief Complaint   Patient presents with    Altered Mental Status         Discharge Recommendations:  Patient would benefit from continued therapy after discharge   PT Equipment Recommendations  Equipment Needed: No  Other: pt has Joanna Stedy, lift chair, power w/c; continue to assess for needs      Patient Diagnosis(es): The primary encounter diagnosis was Pneumonia of right lower lobe due to infectious organism. A diagnosis of Altered mental status, unspecified altered mental status type was also pertinent to this visit.  Past Medical History:  has a past medical history of Anemia, Apnea, sleep, CAD (coronary artery disease), CHF (congestive heart failure) ([facility]), Chronic kidney disease, COPD (chronic obstructive pulmonary disease) ([facility]), Depression, Diabetes mellitus ([facility]), Heart abnormality, Hyperlipidemia, Hyponatremia, Incontinence of urine, Obesity, SIAD (syndrome of inappropriate antidiuresis) ([facility]), Tinea corporis, and Vitamin D insufficiency.  Past Surgical History:  has a past surgical history that includes lumbar laminectomy and cervical laminectomy.    Assessment  Body Structures, Functions, Activity Limitations Requiring Skilled Therapeutic Intervention: Decreased functional mobility ;Decreased ROM;Decreased balance;Decreased endurance;Decreased safe awareness;Increased pain;Decreased strength;Decreased sensation;Decreased posture  Assessment: Pt lives w/  in level entry apartment and normally sleeps in flat sleep comfort bed, requires  assist and Joanna Stedy for transfers w/ lift chair and requires assist for ADLs. Pt is non ambulatory and has access to power w/c but reports low back pain limit use this past week. Pt has hx peewee foot drop.  At PT evaluation, pt was on BiPAP and required min Ax2 out of hospital bed but  awareness of need for safety  Problem Solving: Assistance required to implement solutions;Assistance required to correct errors made;Assistance required to generate solutions;Assistance required to identify errors made  Insights: Decreased awareness of deficits  Initiation: Requires cues for some  Sequencing: Requires cues for some    Objective  Temp: 97 °F (36.1 °C)  Pulse: (!) 123  Heart Rate Source: Monitor  Respirations: 22  SpO2: 96 %  O2 Device: Nasal cannula  BP: 105/69  MAP (Calculated): 81 with nursing     Observation/Palpation  Edema: peewee ankle/feet edema  Gross Assessment  Sensation: Impaired (lower leg and distal neuropathy)    Joint Mobility  ROM RLE: R hip AAROM supine flex grossly 50dg; knee WFL; chronic foot drop  ROM LLE: L hip AAROM supine flex grossly 30dg; knee WFL; chronic foot drop  ROM RUE: WFL for hospital bed mobility and Joanna Stedy  ROM LUE: WFL for hospital bed mobility and Joanna Stedy  Strength RLE  Strength RLE: Exception  Comment: chronic peewee foot drop 1/5 at best  R Hip Flexion: 2-/5  R Knee Flexion: 2-/5  R Knee Extension: 2-/5  Strength LLE  Strength LLE: Exception  Comment: chronic peewee foot drop 1/5 at best  L Hip Flexion: 2-/5  L Knee Flexion: 2-/5  L Knee Extension: 2-/5  Strength RUE  Comment: see OT evaluation  Strength LUE  Comment: see OT evaluation        Balance  Sitting: Impaired (Static - CGA; dynamic - MOD-MAX A)  Standing: Impaired (Static - MAX A x2)  Bed mobility  Rolling to Left: Moderate assistance  Rolling to Right: Moderate assistance  Supine to Sit: Minimal assistance;2 Person assistance  Sit to Supine: Dependent/Total;2 Person assistance  Scooting: Dependent/Total  Bed Mobility Comments: HOB slightly elevated; use of R bed rail; increased time/effort; EOB ~3 mins CGA for safety peewee rails or Joanna Stedy; LE's dependent sit to supine/no AROM returning legs back into bed; retired supine with HOB moderately elevated; on BiPAP for treatment per RN  Transfers  Sit to

## 2024-10-11 NOTE — RT PROTOCOL NOTE
RT Nebulizer Bronchodilator Protocol Note    There is a bronchodilator order in the chart from a provider indicating to follow the RT Bronchodilator Protocol and there is an “Initiate RT Bronchodilator Protocol” order as well (see protocol at bottom of note).    CXR Findings:  XR CHEST PORTABLE    Result Date: 10/10/2024  Right lung airspace opacities compatible with pneumonia.       The findings from the last RT Protocol Assessment were as follows:  Smoking: Smoker 15 pack years or more  Respiratory Pattern: Use of accessory muscles, prolonged exhalation, or RR 26-30 bpm  Breath Sounds: Slightly diminished and/or crackles  Cough: Weak, non-productive  Indication for Bronchodilator Therapy:    Bronchodilator Assessment Score: 12    Aerosolized bronchodilator medication orders have been revised according to the RT Nebulizer Bronchodilator Protocol below.    Respiratory Therapist to perform RT Therapy Protocol Assessment initially then follow the protocol.  Repeat RT Therapy Protocol Assessment PRN for score 0-3 or on second treatment, BID, and PRN for scores above 3.    No Indications - adjust the frequency to every 6 hours PRN wheezing or bronchospasm, if no treatments needed after 48 hours then discontinue using Per Protocol order mode.     If indication present, adjust the RT bronchodilator orders based on the Bronchodilator Assessment Score as indicated below.  If a patient is on this medication at home then do not decrease Frequency below that used at home.    0-3 - enter or revise RT bronchodilator order(s) to equivalent RT Bronchodilator order with Frequency of every 4 hours PRN for wheezing or increased work of breathing using Per Protocol order mode.       4-6 - enter or revise RT Bronchodilator order(s) to two equivalent RT bronchodilator orders with one order with BID Frequency and one order with Frequency of every 4 hours PRN wheezing or increased work of breathing using Per Protocol order mode.

## 2024-10-11 NOTE — PROGRESS NOTES
Pharmacy Note  Warfarin Consult follow-up      Recent Labs     10/11/24  0739   INR 4.2     Recent Labs     10/10/24  1826 10/11/24  0732   HGB 10.4* 11.0*   HCT 31.8* 33.3*    295       Significant Drug-Drug Interactions:  azithromycin        Notes:                   INR above desired at 4.2  Will hold dose today    Daily PT/INR while inpatient.     Maryam Paulson, PharmD 10/11/2024 9:13 AM

## 2024-10-11 NOTE — CONSULTS
Connections: Socially Isolated (10/5/2024)    Received from [facility]    Social Connection and Isolation Panel [NHANES]     Frequency of Communication with Friends and Family: Twice a week     Frequency of Social Gatherings with Friends and Family: Never     Attends [employer] Services: Never     Active Member of Clubs or Organizations: No     Attends Club or Organization Meetings: Never     Marital Status:    Intimate Partner Violence: Not At Risk (10/5/2024)    Received from [facility]    Humiliation, Afraid, Rape, and Kick questionnaire     Fear of Current or Ex-Partner: No     Emotionally Abused: No     Physically Abused: No     Sexually Abused: No   Housing Stability: Low Risk  (10/5/2024)    Received from [facility]    Housing Stability Vital Sign     In the last 12 months, was there a time when you were not able to pay the mortgage or rent on time?: No     In the last 12 months, how many places have you lived?: 1     In the last 12 months, was there a time when you did not have a steady place to sleep or slept in a shelter (including now)?: No       FAMILY HISTORY:  Family History   Problem Relation Age of Onset    Coronary Art Dis Mother     Coronary Art Dis Father        REVIEW OF SYSTEMS:  All other systems reviewed and are negative.      PHYSICAL EXAM:  Vital Signs Blood pressure (!) 140/90, pulse (!) 119, temperature 97.4 °F (36.3 °C), temperature source Axillary, resp. rate 25, height 1.651 m (5' 5\"), weight 96 kg (211 lb 10.3 oz), SpO2 95%, not currently breastfeeding.  Oxygen Amount and Delivery: O2 Flow Rate (L/min): 6 L/min    Admission Weight Weight - Scale: 106 kg (233 lb 11 oz)    General Appearance     Head  Normocephalic, without obvious abnormality, atraumatic    Eyes  conjunctivae/corneas clear. PERRL, EOM's intact.     ENT macroglossia low overhanging soft  Neck  no adenopathy, no carotid bruit, no JVD, supple, symmetrical, trachea midline and  thyroid not enlarged, symmetric, no tenderness/mass/nodules  Lungs coarse bilateral rhonchi  Heart: regular rate and rhythm, S1, S2 normal, no murmur, click, rub or gallop  Abdomen  soft, non-tender; bowel sounds normal; no masses,  no organomegaly  Extremities  Trace peripheral edema  Skin  Skin color, texture, turgor normal. No rashes or lesions  Neurologic: Alert and oriented X 3, normal strength and tone.         Imaging  Multifocal infiltrates including right lower lobe pleural-based infiltrates with multiple pulmonary nodules throughout lung feels most likely inflammatory      CT of shows multifocal infiltrates left greater than right        Lab Review  CBC     Lab Results   Component Value Date/Time    WBC 11.7 10/11/2024 07:32 AM    RBC 3.29 10/11/2024 07:32 AM    HGB 11.0 10/11/2024 07:32 AM    HCT 33.3 10/11/2024 07:32 AM     10/11/2024 07:32 AM    .2 10/11/2024 07:32 AM    MCH 33.4 10/11/2024 07:32 AM    MCHC 33.0 10/11/2024 07:32 AM    RDW 13.6 10/11/2024 07:32 AM    LYMPHOPCT 5 10/11/2024 07:32 AM    MONOPCT 4 10/11/2024 07:32 AM    EOSPCT 1 10/11/2024 07:32 AM    BASOPCT 0 10/11/2024 07:32 AM    MONOSABS 0.50 10/11/2024 07:32 AM    LYMPHSABS 0.60 10/11/2024 07:32 AM    EOSABS 0.10 10/11/2024 07:32 AM    BASOSABS 0.00 10/11/2024 07:32 AM       BMP   Lab Results   Component Value Date/Time     10/11/2024 07:32 AM    K 5.0 10/11/2024 07:32 AM    CL 94 10/11/2024 07:32 AM    CO2 28 10/11/2024 07:32 AM    BUN 31 10/11/2024 07:32 AM    CREATININE 1.1 10/11/2024 07:32 AM    GLUCOSE 124 10/11/2024 07:32 AM    CALCIUM 9.5 10/11/2024 07:32 AM    MG 2.6 07/10/2023 06:48 AM       LFTS  Lab Results   Component Value Date/Time    ALKPHOS 109 10/10/2024 06:26 PM    ALT 8 10/10/2024 06:26 PM    AST 12 10/10/2024 06:26 PM    BILITOT 0.2 10/10/2024 06:26 PM    BILIDIR <0.1 10/10/2024 06:26 PM    IBILI Can not be calculated 10/10/2024 06:26 PM       INR  Recent Labs     10/10/24  1826 10/11/24  0739

## 2024-10-11 NOTE — ED PROVIDER NOTES
[facility] Emergency Department  [address]  [place] [address]  Phone: [phone]  Fax: [phone]      Attending Physician Attestation          CHIEF COMPLAINT       Chief Complaint   Patient presents with    Altered Mental Status       DIAGNOSTIC RESULTS     LABS:  Labs Reviewed   CBC WITH AUTO DIFFERENTIAL - Abnormal; Notable for the following components:       Result Value    WBC 11.2 (*)     RBC 3.15 (*)     Hemoglobin 10.4 (*)     Hematocrit 31.8 (*)     .9 (*)     Neutrophils % 87 (*)     Lymphocytes % 7 (*)     Neutrophils Absolute 9.60 (*)     Lymphocytes Absolute 0.80 (*)     All other components within normal limits   BASIC METABOLIC PANEL - Abnormal; Notable for the following components:    Sodium 127 (*)     Potassium 5.4 (*)     Chloride 86 (*)     CO2 33 (*)     Anion Gap 8 (*)     Glucose 138 (*)     BUN 34 (*)     Creatinine 1.3 (*)     Est, Glom Filt Rate 44 (*)     All other components within normal limits   HEPATIC FUNCTION PANEL - Abnormal; Notable for the following components:    Albumin 3.0 (*)     Alkaline Phosphatase 109 (*)     Total Bilirubin 0.2 (*)     Albumin/Globulin Ratio 0.6 (*)     All other components within normal limits   PROTIME-INR - Abnormal; Notable for the following components:    Protime 29.4 (*)     All other components within normal limits   TROPONIN - Abnormal; Notable for the following components:    Troponin, High Sensitivity 43 (*)     All other components within normal limits   ARTERIAL BLOOD GAS, POC - Abnormal; Notable for the following components:    POC pH 7.315 (*)     POC pCO2 77.1 (*)     POC PO2 52.0 (*)     POC HCO3 39.3 (*)     Positive Base Excess, Art 10.5 (*)     POC O2 SAT 81.0 (*)     All other components within normal limits   CULTURE, BLOOD 2   CULTURE, BLOOD 1   LIPASE   LACTIC ACID   URINALYSIS WITH REFLEX TO CULTURE   BLOOD GAS, ARTERIAL   BLOOD GAS, ARTERIAL       All other labs were within normal

## 2024-10-12 PROBLEM — E87.5 HYPERKALEMIA: Status: RESOLVED | Noted: 2024-10-11 | Resolved: 2024-10-12

## 2024-10-12 PROBLEM — J18.1 PNEUMONIA, LOBAR (HCC): Status: RESOLVED | Noted: 2024-10-10 | Resolved: 2024-10-12

## 2024-10-12 PROBLEM — D72.825 BANDEMIA: Status: RESOLVED | Noted: 2024-10-11 | Resolved: 2024-10-12

## 2024-10-12 PROBLEM — J96.11 CHRONIC RESPIRATORY FAILURE WITH HYPOXIA AND HYPERCAPNIA: Status: ACTIVE | Noted: 2024-10-12

## 2024-10-12 PROBLEM — Z78.9 NONSMOKER: Status: RESOLVED | Noted: 2024-10-11 | Resolved: 2024-10-12

## 2024-10-12 PROBLEM — J96.12 CHRONIC RESPIRATORY FAILURE WITH HYPOXIA AND HYPERCAPNIA: Status: ACTIVE | Noted: 2024-10-12

## 2024-10-12 PROBLEM — R79.89 TROPONIN LEVEL ELEVATED: Status: RESOLVED | Noted: 2023-08-30 | Resolved: 2024-10-12

## 2024-10-12 PROBLEM — J96.02 ACUTE RESPIRATORY FAILURE WITH HYPOXIA AND HYPERCARBIA: Status: RESOLVED | Noted: 2023-07-07 | Resolved: 2024-10-12

## 2024-10-12 PROBLEM — J96.01 ACUTE RESPIRATORY FAILURE WITH HYPOXIA AND HYPERCARBIA: Status: RESOLVED | Noted: 2023-07-07 | Resolved: 2024-10-12

## 2024-10-12 LAB
ANION GAP SERPL CALCULATED.3IONS-SCNC: 10 MMOL/L (ref 9–17)
ANION GAP SERPL CALCULATED.3IONS-SCNC: 12 MMOL/L (ref 9–17)
ANION GAP SERPL CALCULATED.3IONS-SCNC: 8 MMOL/L (ref 9–17)
ANION GAP SERPL CALCULATED.3IONS-SCNC: 9 MMOL/L (ref 9–17)
BASOPHILS # BLD: 0 K/UL (ref 0–0.2)
BASOPHILS NFR BLD: 0 % (ref 0–2)
BUN SERPL-MCNC: 24 MG/DL (ref 8–23)
BUN SERPL-MCNC: 25 MG/DL (ref 8–23)
BUN SERPL-MCNC: 26 MG/DL (ref 8–23)
BUN SERPL-MCNC: 27 MG/DL (ref 8–23)
CALCIUM SERPL-MCNC: 9.2 MG/DL (ref 8.6–10.4)
CALCIUM SERPL-MCNC: 9.3 MG/DL (ref 8.6–10.4)
CALCIUM SERPL-MCNC: 9.3 MG/DL (ref 8.6–10.4)
CALCIUM SERPL-MCNC: 9.5 MG/DL (ref 8.6–10.4)
CHLORIDE SERPL-SCNC: 88 MMOL/L (ref 98–107)
CHLORIDE SERPL-SCNC: 88 MMOL/L (ref 98–107)
CHLORIDE SERPL-SCNC: 90 MMOL/L (ref 98–107)
CHLORIDE SERPL-SCNC: 90 MMOL/L (ref 98–107)
CO2 SERPL-SCNC: 30 MMOL/L (ref 20–31)
CO2 SERPL-SCNC: 33 MMOL/L (ref 20–31)
CO2 SERPL-SCNC: 33 MMOL/L (ref 20–31)
CO2 SERPL-SCNC: 34 MMOL/L (ref 20–31)
CREAT SERPL-MCNC: 1 MG/DL (ref 0.5–0.9)
CREAT SERPL-MCNC: 1 MG/DL (ref 0.5–0.9)
CREAT SERPL-MCNC: 1.1 MG/DL (ref 0.5–0.9)
CREAT SERPL-MCNC: 1.2 MG/DL (ref 0.5–0.9)
EOSINOPHIL # BLD: 0.1 K/UL (ref 0–0.4)
EOSINOPHILS RELATIVE PERCENT: 0 % (ref 1–4)
ERYTHROCYTE [DISTWIDTH] IN BLOOD BY AUTOMATED COUNT: 13.4 % (ref 12.5–15.4)
GFR, ESTIMATED: 48 ML/MIN/1.73M2
GFR, ESTIMATED: 54 ML/MIN/1.73M2
GFR, ESTIMATED: 60 ML/MIN/1.73M2
GFR, ESTIMATED: 60 ML/MIN/1.73M2
GLUCOSE SERPL-MCNC: 123 MG/DL (ref 70–99)
GLUCOSE SERPL-MCNC: 141 MG/DL (ref 70–99)
GLUCOSE SERPL-MCNC: 162 MG/DL (ref 70–99)
GLUCOSE SERPL-MCNC: 176 MG/DL (ref 70–99)
HCT VFR BLD AUTO: 27.9 % (ref 36–46)
HGB BLD-MCNC: 9.3 G/DL (ref 12–16)
INR PPP: 7.7
INR PPP: 9.1
LYMPHOCYTES NFR BLD: 0.7 K/UL (ref 1–4.8)
LYMPHOCYTES RELATIVE PERCENT: 5 % (ref 24–44)
MCH RBC QN AUTO: 32.9 PG (ref 26–34)
MCHC RBC AUTO-ENTMCNC: 33.2 G/DL (ref 31–37)
MCV RBC AUTO: 99.1 FL (ref 80–100)
MICROORGANISM SPEC CULT: NORMAL
MONOCYTES NFR BLD: 0.6 K/UL (ref 0.1–1.2)
MONOCYTES NFR BLD: 5 % (ref 2–11)
NEUTROPHILS NFR BLD: 90 % (ref 36–66)
NEUTS SEG NFR BLD: 11.4 K/UL (ref 1.8–7.7)
PLATELET # BLD AUTO: 368 K/UL (ref 140–450)
PMV BLD AUTO: 7.2 FL (ref 6–12)
POTASSIUM SERPL-SCNC: 3.7 MMOL/L (ref 3.7–5.3)
POTASSIUM SERPL-SCNC: 3.9 MMOL/L (ref 3.7–5.3)
POTASSIUM SERPL-SCNC: 4.1 MMOL/L (ref 3.7–5.3)
POTASSIUM SERPL-SCNC: 4.3 MMOL/L (ref 3.7–5.3)
PROTHROMBIN TIME: 69.9 SEC (ref 9.4–12.6)
PROTHROMBIN TIME: 80.8 SEC (ref 9.4–12.6)
RBC # BLD AUTO: 2.82 M/UL (ref 4–5.2)
SODIUM SERPL-SCNC: 129 MMOL/L (ref 135–144)
SODIUM SERPL-SCNC: 131 MMOL/L (ref 135–144)
SODIUM SERPL-SCNC: 132 MMOL/L (ref 135–144)
SODIUM SERPL-SCNC: 133 MMOL/L (ref 135–144)
SPECIMEN DESCRIPTION: NORMAL
WBC OTHER # BLD: 12.8 K/UL (ref 3.5–11)

## 2024-10-12 PROCEDURE — 94667 MNPJ CHEST WALL 1ST: CPT

## 2024-10-12 PROCEDURE — 36415 COLL VENOUS BLD VENIPUNCTURE: CPT

## 2024-10-12 PROCEDURE — 85610 PROTHROMBIN TIME: CPT

## 2024-10-12 PROCEDURE — 2700000000 HC OXYGEN THERAPY PER DAY

## 2024-10-12 PROCEDURE — 2580000003 HC RX 258

## 2024-10-12 PROCEDURE — 6370000000 HC RX 637 (ALT 250 FOR IP)

## 2024-10-12 PROCEDURE — 94761 N-INVAS EAR/PLS OXIMETRY MLT: CPT

## 2024-10-12 PROCEDURE — 99232 SBSQ HOSP IP/OBS MODERATE 35: CPT | Performed by: FAMILY MEDICINE

## 2024-10-12 PROCEDURE — 2000000000 HC ICU R&B

## 2024-10-12 PROCEDURE — 80048 BASIC METABOLIC PNL TOTAL CA: CPT

## 2024-10-12 PROCEDURE — 94660 CPAP INITIATION&MGMT: CPT

## 2024-10-12 PROCEDURE — 6360000002 HC RX W HCPCS: Performed by: INTERNAL MEDICINE

## 2024-10-12 PROCEDURE — 2580000003 HC RX 258: Performed by: INTERNAL MEDICINE

## 2024-10-12 PROCEDURE — 85025 COMPLETE CBC W/AUTO DIFF WBC: CPT

## 2024-10-12 RX ORDER — HYDROCODONE BITARTRATE AND ACETAMINOPHEN 5; 325 MG/1; MG/1
1 TABLET ORAL EVERY 6 HOURS PRN
Status: DISCONTINUED | OUTPATIENT
Start: 2024-10-12 | End: 2024-10-12

## 2024-10-12 RX ORDER — LEVALBUTEROL INHALATION SOLUTION 0.63 MG/3ML
0.63 SOLUTION RESPIRATORY (INHALATION) EVERY 4 HOURS PRN
Status: DISCONTINUED | OUTPATIENT
Start: 2024-10-12 | End: 2024-10-16 | Stop reason: HOSPADM

## 2024-10-12 RX ORDER — HYDROCODONE BITARTRATE AND ACETAMINOPHEN 5; 325 MG/1; MG/1
2 TABLET ORAL EVERY 6 HOURS PRN
Status: DISCONTINUED | OUTPATIENT
Start: 2024-10-12 | End: 2024-10-16 | Stop reason: HOSPADM

## 2024-10-12 RX ORDER — HYDROCODONE BITARTRATE AND ACETAMINOPHEN 5; 325 MG/1; MG/1
1 TABLET ORAL EVERY 6 HOURS PRN
Status: DISCONTINUED | OUTPATIENT
Start: 2024-10-12 | End: 2024-10-16 | Stop reason: HOSPADM

## 2024-10-12 RX ORDER — HYDROCODONE BITARTRATE AND ACETAMINOPHEN 5; 325 MG/1; MG/1
2 TABLET ORAL EVERY 6 HOURS PRN
COMMUNITY

## 2024-10-12 RX ORDER — CALCIUM CARBONATE 500 MG/1
500 TABLET, CHEWABLE ORAL 2 TIMES DAILY PRN
Status: DISCONTINUED | OUTPATIENT
Start: 2024-10-12 | End: 2024-10-16 | Stop reason: HOSPADM

## 2024-10-12 RX ORDER — AZITHROMYCIN 250 MG/1
500 TABLET, FILM COATED ORAL EVERY 24 HOURS
Status: COMPLETED | OUTPATIENT
Start: 2024-10-12 | End: 2024-10-13

## 2024-10-12 RX ORDER — MORPHINE SULFATE 2 MG/ML
2 INJECTION, SOLUTION INTRAMUSCULAR; INTRAVENOUS EVERY 4 HOURS PRN
Status: DISCONTINUED | OUTPATIENT
Start: 2024-10-12 | End: 2024-10-13

## 2024-10-12 RX ADMIN — CALCIUM CARBONATE (ANTACID) CHEW TAB 500 MG 500 MG: 500 CHEW TAB at 14:48

## 2024-10-12 RX ADMIN — HYDROCODONE BITARTRATE AND ACETAMINOPHEN 1 TABLET: 5; 325 TABLET ORAL at 08:51

## 2024-10-12 RX ADMIN — VENLAFAXINE HYDROCHLORIDE 150 MG: 150 CAPSULE, EXTENDED RELEASE ORAL at 08:52

## 2024-10-12 RX ADMIN — SODIUM CHLORIDE, PRESERVATIVE FREE 10 ML: 5 INJECTION INTRAVENOUS at 20:36

## 2024-10-12 RX ADMIN — HYDROCODONE BITARTRATE AND ACETAMINOPHEN 2 TABLET: 5; 325 TABLET ORAL at 14:48

## 2024-10-12 RX ADMIN — METOPROLOL SUCCINATE 25 MG: 25 TABLET, EXTENDED RELEASE ORAL at 08:51

## 2024-10-12 RX ADMIN — ATORVASTATIN CALCIUM 40 MG: 40 TABLET, FILM COATED ORAL at 08:51

## 2024-10-12 RX ADMIN — TORSEMIDE 20 MG: 20 TABLET ORAL at 08:51

## 2024-10-12 RX ADMIN — SODIUM CHLORIDE, PRESERVATIVE FREE 10 ML: 5 INJECTION INTRAVENOUS at 08:52

## 2024-10-12 RX ADMIN — CEFEPIME 2000 MG: 2 INJECTION, POWDER, FOR SOLUTION INTRAVENOUS at 06:51

## 2024-10-12 RX ADMIN — CEFEPIME 2000 MG: 2 INJECTION, POWDER, FOR SOLUTION INTRAVENOUS at 20:38

## 2024-10-12 RX ADMIN — AZITHROMYCIN DIHYDRATE 500 MG: 250 TABLET, FILM COATED ORAL at 20:36

## 2024-10-12 ASSESSMENT — PAIN SCALES - GENERAL
PAINLEVEL_OUTOF10: 3
PAINLEVEL_OUTOF10: 7
PAINLEVEL_OUTOF10: 4
PAINLEVEL_OUTOF10: 5

## 2024-10-12 ASSESSMENT — PAIN DESCRIPTION - DESCRIPTORS: DESCRIPTORS: ACHING

## 2024-10-12 ASSESSMENT — PAIN DESCRIPTION - LOCATION
LOCATION: BACK
LOCATION: BACK

## 2024-10-12 NOTE — PROGRESS NOTES
[facility] PULMONARY,CRITICAL CARE & SLEEP   Luis Armando Jeronimo MD/Yayo Miller MD/Celso LUONG AGAOCTAVIAP-BC, NP-C      Ashley LUONG NP-C    Jeff LUONG NP-C                                         Pulmonary Progress Note    Patient - Yari Stokes   Age - 69 y.o.   - 1955  MRN - [id number]  [id number]  Date of Admission - 10/10/2024  6:05 PM    Consulting Service/Physician:       Primary Care Physician: Tay Angel DO    SUBJECTIVE:     Chief Complaint:   Chief Complaint   Patient presents with    Altered Mental Status     Subjective:    Yari is resting in bed  She tells me that she wore NIV for most the night, took 2 to 3 breaks about 1 hour each; she tells me she did not sleep during that 1 hour.  During her breaks    Discussed the importance of trying to wear NIV all night and if she is taking a break to try not to nap, to help reduce risk of hypercapnia; she seems understanding, she is aware that we are ordered a split-night sleep study outpatient, she is requesting this be at [facility]     She tells me she is a little nauseous after eating her lunch    VITALS  /77   Pulse (!) 120   Temp 98.4 °F (36.9 °C) (Oral)   Resp 23   Ht 1.651 m (5' 5\")   Wt 96 kg (211 lb 10.3 oz)   LMP  (LMP Unknown)   SpO2 100%   BMI 35.22 kg/m²   Wt Readings from Last 3 Encounters:   10/11/24 96 kg (211 lb 10.3 oz)   23 106.3 kg (234 lb 5.6 oz)   23 98 kg (216 lb 0.8 oz)     I/O (24 Hours)    Intake/Output Summary (Last 24 hours) at 10/12/2024 1332  Last data filed at 10/12/2024 1200  Gross per 24 hour   Intake 494.33 ml   Output 2950 ml   Net -2455.67 ml     Ventilator:   Settings  FiO2 : 40 %  Insp Rise Time (%): 3 %  Exam:   Physical Exam   Constitutional: In no acute distress, alert, 3 L nasal cannula  HENT: Unremarkable  Head: Normocephalic and atraumatic.   Eyes: EOM are normal. Pupils are equal, round, and reactive to  ceFEPIme (MAXIPIME) 2,000 mg in sodium chloride 0.9 % 100 mL IVPB (mini-bag), 2,000 mg, IntraVENous, Q12H, Yayo Lopez MD, Stopped at 10/12/24 0721    atorvastatin (LIPITOR) tablet 40 mg, 40 mg, Oral, Daily, Balbir Dunn MD, 40 mg at 10/12/24 0851    metoprolol succinate (TOPROL XL) extended release tablet 25 mg, 25 mg, Oral, Daily, Balbir Dunn MD, 25 mg at 10/12/24 0851    torsemide (DEMADEX) tablet 20 mg, 20 mg, Oral, Daily, Balbir Dunn MD, 20 mg at 10/12/24 0851    venlafaxine (EFFEXOR XR) extended release capsule 150 mg, 150 mg, Oral, Daily with breakfast, Balbir Dunn MD, 150 mg at 10/12/24 0852    warfarin placeholder: dosing by pharmacy, , Other, RX Placeholder, Balbir Dunn MD    Lab Results:     Lab Results   Component Value Date    WBC 12.8 (H) 10/12/2024    HGB 9.3 (L) 10/12/2024    HCT 27.9 (L) 10/12/2024    MCV 99.1 10/12/2024     10/12/2024     Lab Results   Component Value Date    CALCIUM 9.3 10/12/2024     (L) 10/12/2024    K 4.3 10/12/2024    CO2 30 10/12/2024    CL 90 (L) 10/12/2024    BUN 24 (H) 10/12/2024    CREATININE 1.0 (H) 10/12/2024     No components found for: \"ABGSAMPLETYP\", \"ABGBODYTEMP\", \"ABGPHCORRFOR\", \"[id number]\", \"ABGPHCORRFOOR\", \"ABGPH\", \"ABGPCO2\", \"ABGPO2\", \"ABGBASEEXCES\", \"ABGBASEDEFIC\", \"ABGHCO3\", \"RRUY2OAB\", \"ABGENDTIDALC\", \"ABGALLENSTES\", \"ABGSPO2\", \"ABGSAMEPLESIT\", \"[id number]\", \"ABGOXYGENSOU\"  Lab Results   Component Value Date    INR 7.7 (HH) 10/12/2024    PROTIME 69.9 (H) 10/12/2024       Radiology:   [unfilled]  My reading of film: Previous imaging reviewed    ASSESSMENT:       Acute hypoxic respiratory failure  Multifocal pneumonia bilaterally  Acute on chronic hypercapnic respiratory failure-suspect pCO2 runs in the mid 50  UTI-based on UA awaiting micro cultures, previously had Enterococcus E. coli and Pseudomonas  CECILIA/OHS never had sleep study this has been recommended to her multiple times  Altered mental

## 2024-10-12 NOTE — PROGRESS NOTES
Pharmacy Note  Warfarin Consult follow-up      Recent Labs     10/12/24  0222   INR 9.1*     Recent Labs     10/10/24  1826 10/11/24  0732 10/12/24  0222   HGB 10.4* 11.0* 9.3*   HCT 31.8* 33.3* 27.9*    295 368       Significant Drug-Drug Interactions:  Azithromycin, cefepime      Notes:                   Patient has received no warfarin inpatient. INR has risen to 9.1 today. Recommend not giving reversal agent unless bleeding is present. Continue to hold warfarin.  Daily PT/INR while inpatient.      Mis Lobo, PharmD  [facility] [place]  10/12/2024 8:06 AM

## 2024-10-12 NOTE — PLAN OF CARE
0800 Encouraged patient go get up to chair, patient refused. Educated about the importance of movement especially in the setting of pneumonia, patient verbalizes understanding.     0851 Patient medicated for pain.   1000 Pain improved, patient still declines to get to chair.       1200 Encouraged and attempted to get patient to chair, patient continues to decline.         Problem: Safety - Adult  Goal: Free from fall injury  10/12/2024 0718 by Milagro Ross RN  Outcome: Progressing  10/11/2024 2118 by Saurav Lo RN  Outcome: Progressing     Problem: Chronic Conditions and Co-morbidities  Goal: Patient's chronic conditions and co-morbidity symptoms are monitored and maintained or improved  10/11/2024 2118 by Saurav Lo RN  Outcome: Progressing     Problem: Discharge Planning  Goal: Discharge to home or other facility with appropriate resources  10/11/2024 2118 by Saurav Lo RN  Outcome: Progressing     Problem: Respiratory - Adult  Goal: Achieves optimal ventilation and oxygenation  10/11/2024 2118 by Saurav Lo RN  Outcome: Progressing     Problem: Gastrointestinal - Adult  Goal: Maintains or returns to baseline bowel function  10/11/2024 2118 by Saurav Lo RN  Outcome: Progressing  Goal: Maintains adequate nutritional intake  10/11/2024 2118 by Saurav Lo RN  Outcome: Progressing     Problem: Metabolic/Fluid and Electrolytes - Adult  Goal: Electrolytes maintained within normal limits  10/11/2024 2118 by Saurav Lo RN  Outcome: Progressing  Goal: Hemodynamic stability and optimal renal function maintained  10/11/2024 2118 by Saurav Lo RN  Outcome: Progressing     Problem: Skin/Tissue Integrity - Adult  Goal: Skin integrity remains intact  10/11/2024 2118 by Saurav Lo RN  Outcome: Progressing     Problem: Skin/Tissue Integrity  Goal: Absence of new skin breakdown  Description: 1.  Monitor for areas of redness and/or skin

## 2024-10-12 NOTE — PLAN OF CARE
Problem: Safety - Adult  Goal: Free from fall injury  Outcome: Progressing     Problem: Chronic Conditions and Co-morbidities  Goal: Patient's chronic conditions and co-morbidity symptoms are monitored and maintained or improved  Outcome: Progressing     Problem: Discharge Planning  Goal: Discharge to home or other facility with appropriate resources  Outcome: Progressing     Problem: Respiratory - Adult  Goal: Achieves optimal ventilation and oxygenation  10/11/2024 2118 by Saurav Lo RN  Outcome: Progressing  10/11/2024 0756 by Frieda Mckeon RCP  Outcome: Progressing     Problem: Gastrointestinal - Adult  Goal: Maintains or returns to baseline bowel function  Outcome: Progressing  Goal: Maintains adequate nutritional intake  Outcome: Progressing     Problem: Metabolic/Fluid and Electrolytes - Adult  Goal: Electrolytes maintained within normal limits  Outcome: Progressing  Goal: Hemodynamic stability and optimal renal function maintained  Outcome: Progressing     Problem: Skin/Tissue Integrity - Adult  Goal: Skin integrity remains intact  Outcome: Progressing     Problem: Skin/Tissue Integrity  Goal: Absence of new skin breakdown  Description: 1.  Monitor for areas of redness and/or skin breakdown  2.  Assess vascular access sites hourly  3.  Every 4-6 hours minimum:  Change oxygen saturation probe site  4.  Every 4-6 hours:  If on nasal continuous positive airway pressure, respiratory therapy assess nares and determine need for appliance change or resting period.  Outcome: Progressing

## 2024-10-13 PROBLEM — G89.29 CHRONIC MIDLINE LOW BACK PAIN WITHOUT SCIATICA: Status: ACTIVE | Noted: 2024-10-13

## 2024-10-13 PROBLEM — M54.50 CHRONIC MIDLINE LOW BACK PAIN WITHOUT SCIATICA: Status: ACTIVE | Noted: 2024-10-13

## 2024-10-13 PROBLEM — R00.0 TACHYCARDIA: Status: ACTIVE | Noted: 2024-10-13

## 2024-10-13 LAB
ANION GAP SERPL CALCULATED.3IONS-SCNC: 10 MMOL/L (ref 9–17)
ANION GAP SERPL CALCULATED.3IONS-SCNC: 9 MMOL/L (ref 9–17)
BACTERIA URNS QL MICRO: ABNORMAL
BASOPHILS # BLD: 0 K/UL (ref 0–0.2)
BASOPHILS NFR BLD: 0 % (ref 0–2)
BILIRUB UR QL STRIP: NEGATIVE
BUN SERPL-MCNC: 24 MG/DL (ref 8–23)
BUN SERPL-MCNC: 26 MG/DL (ref 8–23)
CALCIUM SERPL-MCNC: 9.6 MG/DL (ref 8.6–10.4)
CALCIUM SERPL-MCNC: 9.9 MG/DL (ref 8.6–10.4)
CHARACTER UR: ABNORMAL
CHLORIDE SERPL-SCNC: 89 MMOL/L (ref 98–107)
CHLORIDE SERPL-SCNC: 89 MMOL/L (ref 98–107)
CLARITY UR: ABNORMAL
CO2 SERPL-SCNC: 34 MMOL/L (ref 20–31)
CO2 SERPL-SCNC: 34 MMOL/L (ref 20–31)
COLOR UR: YELLOW
CREAT SERPL-MCNC: 1.1 MG/DL (ref 0.5–0.9)
CREAT SERPL-MCNC: 1.2 MG/DL (ref 0.5–0.9)
EOSINOPHIL # BLD: 0.1 K/UL (ref 0–0.4)
EOSINOPHILS RELATIVE PERCENT: 1 % (ref 1–4)
EPI CELLS #/AREA URNS HPF: ABNORMAL /HPF (ref 0–5)
ERYTHROCYTE [DISTWIDTH] IN BLOOD BY AUTOMATED COUNT: 13.5 % (ref 12.5–15.4)
GFR, ESTIMATED: 48 ML/MIN/1.73M2
GFR, ESTIMATED: 54 ML/MIN/1.73M2
GLUCOSE BLD-MCNC: 212 MG/DL (ref 65–105)
GLUCOSE SERPL-MCNC: 153 MG/DL (ref 70–99)
GLUCOSE SERPL-MCNC: 168 MG/DL (ref 70–99)
GLUCOSE UR STRIP-MCNC: ABNORMAL MG/DL
HCT VFR BLD AUTO: 28.3 % (ref 36–46)
HGB BLD-MCNC: 9.4 G/DL (ref 12–16)
HGB UR QL STRIP.AUTO: ABNORMAL
INR PPP: 1.8
INR PPP: >17.8
KETONES UR STRIP-MCNC: NEGATIVE MG/DL
LEUKOCYTE ESTERASE UR QL STRIP: ABNORMAL
LYMPHOCYTES NFR BLD: 0.8 K/UL (ref 1–4.8)
LYMPHOCYTES RELATIVE PERCENT: 7 % (ref 24–44)
MCH RBC QN AUTO: 33 PG (ref 26–34)
MCHC RBC AUTO-ENTMCNC: 33.3 G/DL (ref 31–37)
MCV RBC AUTO: 99.1 FL (ref 80–100)
MONOCYTES NFR BLD: 0.8 K/UL (ref 0.1–1.2)
MONOCYTES NFR BLD: 7 % (ref 2–11)
NEUTROPHILS NFR BLD: 85 % (ref 36–66)
NEUTS SEG NFR BLD: 10.4 K/UL (ref 1.8–7.7)
NITRITE UR QL STRIP: NEGATIVE
PH UR STRIP: 6.5 [PH] (ref 5–8)
PLATELET # BLD AUTO: 337 K/UL (ref 140–450)
PMV BLD AUTO: 6.9 FL (ref 6–12)
POTASSIUM SERPL-SCNC: 3.8 MMOL/L (ref 3.7–5.3)
POTASSIUM SERPL-SCNC: 4.2 MMOL/L (ref 3.7–5.3)
PROT UR STRIP-MCNC: NEGATIVE MG/DL
PROTHROMBIN TIME: 18.5 SEC (ref 9.4–12.6)
PROTHROMBIN TIME: >150 SEC (ref 9.4–12.6)
RBC # BLD AUTO: 2.86 M/UL (ref 4–5.2)
RBC #/AREA URNS HPF: ABNORMAL /HPF (ref 0–2)
SODIUM SERPL-SCNC: 132 MMOL/L (ref 135–144)
SODIUM SERPL-SCNC: 133 MMOL/L (ref 135–144)
SP GR UR STRIP: 1.01 (ref 1–1.03)
UROBILINOGEN UR STRIP-ACNC: NORMAL EU/DL (ref 0–1)
WBC #/AREA URNS HPF: ABNORMAL /HPF (ref 0–5)
WBC OTHER # BLD: 12.1 K/UL (ref 3.5–11)

## 2024-10-13 PROCEDURE — 2580000003 HC RX 258

## 2024-10-13 PROCEDURE — 97535 SELF CARE MNGMENT TRAINING: CPT

## 2024-10-13 PROCEDURE — 36415 COLL VENOUS BLD VENIPUNCTURE: CPT

## 2024-10-13 PROCEDURE — 80048 BASIC METABOLIC PNL TOTAL CA: CPT

## 2024-10-13 PROCEDURE — 6370000000 HC RX 637 (ALT 250 FOR IP)

## 2024-10-13 PROCEDURE — 81001 URINALYSIS AUTO W/SCOPE: CPT

## 2024-10-13 PROCEDURE — 6360000002 HC RX W HCPCS

## 2024-10-13 PROCEDURE — 6360000002 HC RX W HCPCS: Performed by: INTERNAL MEDICINE

## 2024-10-13 PROCEDURE — 2580000003 HC RX 258: Performed by: INTERNAL MEDICINE

## 2024-10-13 PROCEDURE — 2000000000 HC ICU R&B

## 2024-10-13 PROCEDURE — 94660 CPAP INITIATION&MGMT: CPT

## 2024-10-13 PROCEDURE — 87086 URINE CULTURE/COLONY COUNT: CPT

## 2024-10-13 PROCEDURE — 2700000000 HC OXYGEN THERAPY PER DAY

## 2024-10-13 PROCEDURE — 94762 N-INVAS EAR/PLS OXIMTRY CONT: CPT

## 2024-10-13 PROCEDURE — 85025 COMPLETE CBC W/AUTO DIFF WBC: CPT

## 2024-10-13 PROCEDURE — 85610 PROTHROMBIN TIME: CPT

## 2024-10-13 PROCEDURE — 82947 ASSAY GLUCOSE BLOOD QUANT: CPT

## 2024-10-13 PROCEDURE — 97530 THERAPEUTIC ACTIVITIES: CPT

## 2024-10-13 RX ORDER — LIDOCAINE 4 G/G
1 PATCH TOPICAL DAILY
Status: DISCONTINUED | OUTPATIENT
Start: 2024-10-13 | End: 2024-10-16 | Stop reason: HOSPADM

## 2024-10-13 RX ORDER — MORPHINE SULFATE 4 MG/ML
4 INJECTION, SOLUTION INTRAMUSCULAR; INTRAVENOUS EVERY 4 HOURS PRN
Status: DISCONTINUED | OUTPATIENT
Start: 2024-10-13 | End: 2024-10-16 | Stop reason: HOSPADM

## 2024-10-13 RX ORDER — METOPROLOL SUCCINATE 25 MG/1
25 TABLET, EXTENDED RELEASE ORAL ONCE
Status: COMPLETED | OUTPATIENT
Start: 2024-10-13 | End: 2024-10-13

## 2024-10-13 RX ORDER — METOPROLOL SUCCINATE 50 MG/1
50 TABLET, EXTENDED RELEASE ORAL DAILY
Status: DISCONTINUED | OUTPATIENT
Start: 2024-10-14 | End: 2024-10-16 | Stop reason: HOSPADM

## 2024-10-13 RX ORDER — WARFARIN SODIUM 5 MG/1
5 TABLET ORAL
Status: COMPLETED | OUTPATIENT
Start: 2024-10-13 | End: 2024-10-13

## 2024-10-13 RX ADMIN — METOPROLOL SUCCINATE 25 MG: 25 TABLET, EXTENDED RELEASE ORAL at 08:28

## 2024-10-13 RX ADMIN — TORSEMIDE 20 MG: 20 TABLET ORAL at 08:28

## 2024-10-13 RX ADMIN — WARFARIN SODIUM 5 MG: 5 TABLET ORAL at 18:16

## 2024-10-13 RX ADMIN — CEFEPIME 2000 MG: 2 INJECTION, POWDER, FOR SOLUTION INTRAVENOUS at 08:33

## 2024-10-13 RX ADMIN — AZITHROMYCIN DIHYDRATE 500 MG: 250 TABLET, FILM COATED ORAL at 20:27

## 2024-10-13 RX ADMIN — SODIUM CHLORIDE, PRESERVATIVE FREE 10 ML: 5 INJECTION INTRAVENOUS at 09:53

## 2024-10-13 RX ADMIN — VENLAFAXINE HYDROCHLORIDE 150 MG: 150 CAPSULE, EXTENDED RELEASE ORAL at 08:28

## 2024-10-13 RX ADMIN — ATORVASTATIN CALCIUM 40 MG: 40 TABLET, FILM COATED ORAL at 08:28

## 2024-10-13 RX ADMIN — SODIUM CHLORIDE, PRESERVATIVE FREE 10 ML: 5 INJECTION INTRAVENOUS at 20:27

## 2024-10-13 RX ADMIN — HYDROCODONE BITARTRATE AND ACETAMINOPHEN 2 TABLET: 5; 325 TABLET ORAL at 13:49

## 2024-10-13 RX ADMIN — MORPHINE SULFATE 2 MG: 2 INJECTION, SOLUTION INTRAMUSCULAR; INTRAVENOUS at 08:28

## 2024-10-13 RX ADMIN — HYDROCODONE BITARTRATE AND ACETAMINOPHEN 2 TABLET: 5; 325 TABLET ORAL at 20:28

## 2024-10-13 RX ADMIN — CEFEPIME 2000 MG: 2 INJECTION, POWDER, FOR SOLUTION INTRAVENOUS at 20:33

## 2024-10-13 RX ADMIN — HYDROCODONE BITARTRATE AND ACETAMINOPHEN 1 TABLET: 5; 325 TABLET ORAL at 06:51

## 2024-10-13 RX ADMIN — METOPROLOL SUCCINATE 25 MG: 25 TABLET, FILM COATED, EXTENDED RELEASE ORAL at 10:00

## 2024-10-13 RX ADMIN — CALCIUM CARBONATE (ANTACID) CHEW TAB 500 MG 500 MG: 500 CHEW TAB at 10:00

## 2024-10-13 ASSESSMENT — PAIN SCALES - GENERAL
PAINLEVEL_OUTOF10: 6
PAINLEVEL_OUTOF10: 6
PAINLEVEL_OUTOF10: 7
PAINLEVEL_OUTOF10: 5
PAINLEVEL_OUTOF10: 6
PAINLEVEL_OUTOF10: 5

## 2024-10-13 ASSESSMENT — PAIN DESCRIPTION - DESCRIPTORS
DESCRIPTORS: ACHING

## 2024-10-13 ASSESSMENT — PAIN DESCRIPTION - LOCATION
LOCATION: GENERALIZED
LOCATION: BACK

## 2024-10-13 ASSESSMENT — PAIN DESCRIPTION - ORIENTATION: ORIENTATION: LOWER

## 2024-10-13 NOTE — PROGRESS NOTES
Occupational Therapy  Facility/Department: [address]   Daily Treatment Note  Patient Name: Yari Stokes        MRN: [id number]    : 1955    Date of Service: 10/13/2024    Discharge Recommendations  Discharge Recommendations: Patient would benefit from continued therapy after discharge    OT Equipment Recommendations  Other: AE/DME recommnedations TBD    Assessment  Performance deficits / Impairments: Decreased functional mobility ;Decreased balance;Decreased ADL status;Decreased posture;Decreased endurance;Decreased strength;Decreased ROM;Decreased high-level IADLs;Decreased safe awareness;Decreased coordination;Decreased cognition    Assessment: Pt presents with decreased ADL status secondary to above noted deficits. At this time, pt is MMAX A x2 sup>sit, MOD A x2 sit<>stand with freda steady, DEP LB dress/toileting and does not demonstrate the functional ability to safely return to prior living arrangements. Pt to benefit from continued therapy services while hospitalized and following discharge to maximize pt's safety and independence in performing functional tasks.    Prognosis: Good  REQUIRES OT FOLLOW-UP: Yes    Activity Tolerance  Activity Tolerance: Patient Tolerated treatment well    Safety Devices  Type of Devices: Call light within reach;Chair alarm in place;Gait belt;Left in chair;Nurse notified (recommended nursing use Maxi Move lift to assist patient back to bed)  Restraints  Restraints Initially in Place: No    Restrictions/Precautions  Restrictions/Precautions  Restrictions/Precautions: Fall Risk;Bed Alarm;General Precautions;Up as Tolerated  Required Braces or Orthoses?: No  Position Activity Restriction  Other position/activity restrictions: non ambulatory; hx Freda Stedy to lift chair at home and home O2    Subjective  General  Patient assessed for rehabilitation services?: Yes  Response to previous treatment: Patient with no complaints from previous session  Family / Caregiver Present:

## 2024-10-13 NOTE — PROGRESS NOTES
[facility] PULMONARY,CRITICAL CARE & SLEEP   Luis Armando Jeronimo MD/Yayo Miller MD/Celso LUONG AGAP-BC, NP-C      Ashley LUONG NP-C    Jeff LUONG NP-C                                         Pulmonary Progress Note    Patient - Yari Stokes   Age - 69 y.o.   - 1955  MRN - [id number]  [id number]  Date of Admission - 10/10/2024  6:05 PM    Consulting Service/Physician:       Primary Care Physician: Tay Angel DO    SUBJECTIVE:     Chief Complaint:   Chief Complaint   Patient presents with    Altered Mental Status     Subjective:    She is resting in her chair, eating lunch  In no acute distress, down to 2 L nasal cannula  She tells me she attempted to wear NIV last night but did not wear it very much, due to leaks  Tells me she kept trying to work with the therapist, pretty unclear what happened given she tolerated the night before without complaints of leaks      VITALS  /67   Pulse (!) 110   Temp 98 °F (36.7 °C)   Resp 20   Ht 1.651 m (5' 5\")   Wt 96 kg (211 lb 10.3 oz)   LMP  (LMP Unknown)   SpO2 100%   BMI 35.22 kg/m²   Wt Readings from Last 3 Encounters:   10/11/24 96 kg (211 lb 10.3 oz)   23 106.3 kg (234 lb 5.6 oz)   23 98 kg (216 lb 0.8 oz)     I/O (24 Hours)    Intake/Output Summary (Last 24 hours) at 10/13/2024 1317  Last data filed at 10/13/2024 1000  Gross per 24 hour   Intake 197.17 ml   Output 2600 ml   Net -2402.83 ml     Ventilator:   Settings  FiO2 : 40 %  Insp Rise Time (%): 3 %  Exam:   Physical Exam   Constitutional: In no acute distress, alert, 2 L nasal cannula  HENT: Unremarkable  Head: Normocephalic and atraumatic.   Eyes: EOM are normal. Pupils are equal, round, and reactive to light.   Neck: Neck supple.   Cardiovascular: Sinus tachycardia, no JVD, no murmurs  Pulmonary/Chest: Unlabored, breath sounds diminished bilaterally anteriorly  Abdominal: Soft, nondistended,  MD Balbir    polyethylene glycol (GLYCOLAX) packet 17 g, 17 g, Oral, Daily PRN, Balbir Dunn MD    acetaminophen (TYLENOL) tablet 650 mg, 650 mg, Oral, Q6H PRN **OR** acetaminophen (TYLENOL) suppository 650 mg, 650 mg, Rectal, Q6H PRN, Balbir Dunn MD    benzonatate (TESSALON) capsule 100 mg, 100 mg, Oral, TID PRN, Balbir Dunn MD    ceFEPIme (MAXIPIME) 2,000 mg in sodium chloride 0.9 % 100 mL IVPB (mini-bag), 2,000 mg, IntraVENous, Q12H, Yayo Lopez MD, Stopped at 10/13/24 0903    atorvastatin (LIPITOR) tablet 40 mg, 40 mg, Oral, Daily, Balbir Dunn MD, 40 mg at 10/13/24 0828    torsemide (DEMADEX) tablet 20 mg, 20 mg, Oral, Daily, Balbir Dunn MD, 20 mg at 10/13/24 0828    venlafaxine (EFFEXOR XR) extended release capsule 150 mg, 150 mg, Oral, Daily with breakfast, Balbir Dunn MD, 150 mg at 10/13/24 0828    warfarin placeholder: dosing by pharmacy, , Other, RX Placeholder, Balbir Dunn MD    Lab Results:     Lab Results   Component Value Date    WBC 12.1 (H) 10/13/2024    HGB 9.4 (L) 10/13/2024    HCT 28.3 (L) 10/13/2024    MCV 99.1 10/13/2024     10/13/2024     Lab Results   Component Value Date    CALCIUM 9.9 10/13/2024     (L) 10/13/2024    K 4.2 10/13/2024    CO2 34 (H) 10/13/2024    CL 89 (L) 10/13/2024    BUN 24 (H) 10/13/2024    CREATININE 1.1 (H) 10/13/2024     No components found for: \"ABGSAMPLETYP\", \"ABGBODYTEMP\", \"ABGPHCORRFOR\", \"[id number]\", \"ABGPHCORRFOOR\", \"ABGPH\", \"ABGPCO2\", \"ABGPO2\", \"ABGBASEEXCES\", \"ABGBASEDEFIC\", \"ABGHCO3\", \"[id number]\", \"ABGENDTIDALC\", \"ABGALLENSTES\", \"ABGSPO2\", \"ABGSAMEPLESIT\", \"[id number]\", \"ABGOXYGENSOU\"  Lab Results   Component Value Date    INR 1.8 10/13/2024    PROTIME 18.5 (H) 10/13/2024       Radiology:   [unfilled]  My reading of film: Previous imaging reviewed    ASSESSMENT:       Acute hypoxic respiratory failure  Multifocal pneumonia bilaterally  Acute on chronic hypercapnic respiratory

## 2024-10-13 NOTE — PROGRESS NOTES
Pharmacy Note  Warfarin Consult follow-up      Recent Labs     10/13/24  0355   INR >17.8*     Recent Labs     10/11/24  0732 10/12/24  0222 10/13/24  0226   HGB 11.0* 9.3* 9.4*   HCT 33.3* 27.9* 28.3*    368 337       Significant Drug-Drug Interactions:  Azithromycin, cefepime      Notes:                   Significantly supratherapeutic INR today. Consider giving vitamin K if repeat INR the same - per physician currently ruling out lab error.  Daily PT/INR while inpatient.     Addendum: repeat INR resulted 1.8 - resume warfarin today, give warfarin 5 mg x 1 dose.    Mis Lobo, PharmD  [facility]  10/13/2024 8:51 AM

## 2024-10-13 NOTE — PROGRESS NOTES
Physical Therapy  Facility/Department: [address]  Treatment Note     Name: Yari Stokes  : 1955  MRN: [id number]  Date of Service: 10/13/2024    Discharge Recommendations:  Patient would benefit from continued therapy after discharge   PT Equipment Recommendations  Equipment Needed: No  Other: pt has Freda Stedy, lift chair, power w/c; continue to assess for needs      Patient Diagnosis(es): The primary encounter diagnosis was Pneumonia of right lower lobe due to infectious organism. Diagnoses of Altered mental status, unspecified altered mental status type and CECILIA (obstructive sleep apnea) were also pertinent to this visit.  Past Medical History:  has a past medical history of Anemia, Apnea, sleep, CAD (coronary artery disease), CHF (congestive heart failure) ([facility]), Chronic kidney disease, Depression, Diabetes mellitus ([facility]), Heart abnormality, Hyperlipidemia, Hyponatremia, Incontinence of urine, Obesity, SIAD (syndrome of inappropriate antidiuresis) ([facility]), Tinea corporis, and Vitamin D insufficiency.  Past Surgical History:  has a past surgical history that includes lumbar laminectomy and cervical laminectomy.    Assessment  Body Structures, Functions, Activity Limitations Requiring Skilled Therapeutic Intervention: Decreased functional mobility ;Decreased ROM;Decreased balance;Decreased endurance;Decreased safe awareness;Increased pain;Decreased strength;Decreased sensation;Decreased posture  Assessment: Pt lives w/  in level entry apartment and normally sleeps in flat sleep comfort bed, requires  assist and Freda Stedy for transfers w/ lift chair and requires assist for ADLs. Pt is non ambulatory and has access to power w/c but reports low back pain limit use this past week. Pt has hx peewee foot drop.  Currently, pt was on O2 and required MaxA x 2 for bed mobility, ModA x 2 to stand with freda stedy. Currently pt is not safe to return to prior living arrangements. Pt requires 24hr assist and  to stand)  Has the patient had two or more falls in the past year or any fall with injury in the past year?: No  Receives Help From: Family  ADL Assistance: Needs assistance  Bath: Maximum assistance (sponge bath; pt does front upper half of body and  does the rest)  Dressing: Maximum assistance  Grooming: Supervision  Feeding: Supervision  Toileting: Needs assistance (pt goes in brief and  changes; hx spouse check once in am and other in pm)  Homemaking Responsibilities: No (Spouse)  Ambulation Assistance: Non-ambulatory  Transfer Assistance: Needs assistance (Joanna Stedy bed to lift chair; stays in lift chair all day; bed at night only)  Active : No  Patient's  Info: spouse handicapped accessible van  Mode of Transportation: Van  Occupation: Retired  Type of Occupation:   Leisure & Hobbies: watch TV, read  IADL Comments: R handed; DEP IADLs  Vision/Hearing  Vision  Vision: Impaired  Vision Exceptions: Wears glasses for reading  Hearing  Hearing: Exceptions to WFL  Hearing Exceptions: Hard of hearing/hearing concerns    Cognition   Orientation  Overall Orientation Status: Within Functional Limits  Orientation Level: Oriented X4  Cognition  Overall Cognitive Status: Exceptions  Arousal/Alertness: Appears intact  Following Commands: Follows multistep commands with increased time;Follows multistep commands with repitition;Follows one step commands consistently  Attention Span: Appears intact  Safety Judgement: Decreased awareness of need for safety  Problem Solving: Assistance required to implement solutions;Assistance required to correct errors made;Assistance required to generate solutions;Assistance required to identify errors made  Insights: Decreased awareness of deficits  Initiation: Requires cues for some  Sequencing: Requires cues for some    Objective  Temp: 98 °F (36.7 °C)  Pulse: (!) 117  Heart Rate Source: Monitor  Respirations: 22  SpO2: 96 %  O2 Device: Nasal

## 2024-10-13 NOTE — PLAN OF CARE
Problem: Safety - Adult  Goal: Free from fall injury  Outcome: Progressing     Problem: Chronic Conditions and Co-morbidities  Goal: Patient's chronic conditions and co-morbidity symptoms are monitored and maintained or improved  Outcome: Progressing     Problem: Discharge Planning  Goal: Discharge to home or other facility with appropriate resources  Outcome: Progressing     Problem: Respiratory - Adult  Goal: Achieves optimal ventilation and oxygenation  Outcome: Progressing     Problem: Gastrointestinal - Adult  Goal: Maintains or returns to baseline bowel function  Outcome: Progressing  Goal: Maintains adequate nutritional intake  Outcome: Progressing     Problem: Metabolic/Fluid and Electrolytes - Adult  Goal: Electrolytes maintained within normal limits  Outcome: Progressing  Goal: Hemodynamic stability and optimal renal function maintained  Outcome: Progressing     Problem: Skin/Tissue Integrity - Adult  Goal: Skin integrity remains intact  Outcome: Progressing     Problem: Skin/Tissue Integrity  Goal: Absence of new skin breakdown  Description: 1.  Monitor for areas of redness and/or skin breakdown  2.  Assess vascular access sites hourly  3.  Every 4-6 hours minimum:  Change oxygen saturation probe site  4.  Every 4-6 hours:  If on nasal continuous positive airway pressure, respiratory therapy assess nares and determine need for appliance change or resting period.  Outcome: Progressing

## 2024-10-13 NOTE — PLAN OF CARE
Problem: Safety - Adult  Goal: Free from fall injury  10/13/2024 0732 by Milagro Ross RN  Outcome: Progressing  10/12/2024 2212 by Saurav Lo RN  Outcome: Progressing     Problem: Chronic Conditions and Co-morbidities  Goal: Patient's chronic conditions and co-morbidity symptoms are monitored and maintained or improved  10/12/2024 2212 by Saurav Lo RN  Outcome: Progressing     Problem: Discharge Planning  Goal: Discharge to home or other facility with appropriate resources  10/12/2024 2212 by Saurav Lo RN  Outcome: Progressing     Problem: Respiratory - Adult  Goal: Achieves optimal ventilation and oxygenation  10/12/2024 2212 by Saurav Lo RN  Outcome: Progressing     Problem: Gastrointestinal - Adult  Goal: Maintains or returns to baseline bowel function  10/12/2024 2212 by Saurav Lo RN  Outcome: Progressing  Goal: Maintains adequate nutritional intake  10/12/2024 2212 by Saurav Lo RN  Outcome: Progressing     Problem: Metabolic/Fluid and Electrolytes - Adult  Goal: Electrolytes maintained within normal limits  10/12/2024 2212 by Saurav Lo RN  Outcome: Progressing  Goal: Hemodynamic stability and optimal renal function maintained  10/12/2024 2212 by Saurav Lo RN  Outcome: Progressing     Problem: Skin/Tissue Integrity - Adult  Goal: Skin integrity remains intact  10/12/2024 2212 by Saurav Lo RN  Outcome: Progressing     Problem: Skin/Tissue Integrity  Goal: Absence of new skin breakdown  Description: 1.  Monitor for areas of redness and/or skin breakdown  2.  Assess vascular access sites hourly  3.  Every 4-6 hours minimum:  Change oxygen saturation probe site  4.  Every 4-6 hours:  If on nasal continuous positive airway pressure, respiratory therapy assess nares and determine need for appliance change or resting period.  10/12/2024 2212 by Saurav Lo RN  Outcome: Progressing

## 2024-10-13 NOTE — PROGRESS NOTES
[facility] Residency  Inpatient Service     Progress Note  10/13/2024    9:25 AM    Name:   Yari Stokes  MRN:     [id number]     Acct:      [id number]   Room:   [address] [place] [address] Day:  3  Admit Date:  10/10/2024  6:05 PM    PCP:   Tay Angel DO  Code Status:  Full Code    Subjective:     Overnight events: No significant events.    Pt was examined at bedside.  Patient endorsed improved breathing and using BiPAP at night.  Patient endorsed a little shortness of breath when changing position and denied worsening daytime sleepiness. Patient endorsed history of sleep apnea. Patient denied chest pain and palpitations. Patient reported achy, non-radiating, lower back pain that is 7-8/10.  Pain decreases to 7 with Norco.     Medications:     Allergies:    Allergies   Allergen Reactions    Diphenhydramine-Phenylephrine Rash    Sudafed [Pseudoephedrine]     Wellbutrin [Bupropion]        Current Meds:   Scheduled Meds:    lidocaine  1 patch TransDERmal Daily    azithromycin  500 mg Oral Q24H    sodium chloride flush  5-40 mL IntraVENous 2 times per day    cefepime  2,000 mg IntraVENous Q12H    atorvastatin  40 mg Oral Daily    metoprolol succinate  25 mg Oral Daily    torsemide  20 mg Oral Daily    venlafaxine  150 mg Oral Daily with breakfast    warfarin placeholder: dosing by pharmacy   Other RX Placeholder     Continuous Infusions:    sodium chloride       PRN Meds: morphine, HYDROcodone-acetaminophen **OR** HYDROcodone 5 mg - acetaminophen, levalbuterol, calcium carbonate, sodium chloride flush, sodium chloride, ondansetron **OR** ondansetron, polyethylene glycol, acetaminophen **OR** acetaminophen, benzonatate    ROS:   ROS is negative except for points noted above.    Data:     Vitals:  BP (!) 146/93   Pulse (!) 116   Temp 98 °F (36.7 °C) (Oral)   Resp 20   Ht 1.651 m (5' 5\")   Wt 96 kg (211 lb 10.3 oz)   LMP  (LMP Unknown)   SpO2 96%   BMI 35.22 kg/m²  is no guarding or rebound.   Skin:     General: Skin is warm.      Capillary Refill: Capillary refill takes less than 2 seconds.      Comments: Bruising noted in right arm where labs are drawn  No acute bleeding and wounds noted upon inspection   Neurological:      Mental Status: She is alert.        Assessment:     Hospital Problems             Last Modified POA    * (Principal) Pneumonia of right lower lobe due to infectious organism 10/12/2024 Yes    Acute cystitis without hematuria 10/11/2024 Yes    Anemia, macrocytic 10/11/2024 Yes    Ambulatory dysfunction 10/11/2024 Yes    History of pulmonary embolism 10/11/2024 Yes    Chronic anticoagulation 10/11/2024 Yes    Stage 3b chronic kidney disease (CKD) ([facility]) 10/11/2024 Yes    Chronic obstructive pulmonary disease ([facility]) 10/11/2024 Yes    Moderate protein-calorie malnutrition ([facility]) 10/11/2024 Yes    Exposure to second hand smoke 10/11/2024 Yes    Chronic heart failure with preserved ejection fraction ([facility]) 10/11/2024 Yes    Hyponatremia 10/11/2024 Yes    Chronic respiratory failure with hypoxia and hypercapnia 10/12/2024 Yes    Chronic midline low back pain without sciatica 10/13/2024 Yes       Plan:   Acute on chronic respiratory failure with hypoxia and hypercarbia, improving  Pneumonia, lobar  COPD  Mycoplasma antibodies pending  Blood culture no growth in 2 days  Legionella and covid negative  Pulmonology following  Monitor: SpO2 goal sat> 88%,   Continue cefepime day 3/7 days  Continue Zithromax day 3/3  Outpatient F/U in 2 to 4 weeks  PFT and F/U CT in 6 weeks   Sleep study ordered  Continue Xopenex  Continue Zithromax day 3/3  Encourage use flutter valve and incentive spirometry  PT/OT  Wean O2 as tolerated, currently on 2 L  Home O2 eval before discharge    Acute cystitis without hematuria  Continue cefepime as above     Lower back pain  History of ambulatory dysfunction  Ordered lidocaine 4% patch  Encourage patient to change positions, sit on

## 2024-10-14 ENCOUNTER — APPOINTMENT (OUTPATIENT)
Dept: GENERAL RADIOLOGY | Age: 69
DRG: 190 | End: 2024-10-14
Payer: MEDICARE

## 2024-10-14 LAB
ALLEN TEST: ABNORMAL
ANION GAP SERPL CALCULATED.3IONS-SCNC: 8 MMOL/L (ref 9–17)
BASOPHILS # BLD: 0.1 K/UL (ref 0–0.2)
BASOPHILS NFR BLD: 1 % (ref 0–2)
BUN SERPL-MCNC: 28 MG/DL (ref 8–23)
CALCIUM SERPL-MCNC: 10 MG/DL (ref 8.6–10.4)
CHLORIDE SERPL-SCNC: 89 MMOL/L (ref 98–107)
CO2 SERPL-SCNC: 33 MMOL/L (ref 20–31)
CREAT SERPL-MCNC: 1.1 MG/DL (ref 0.5–0.9)
CRITICAL ACTION: NORMAL
CRITICAL ACTION: NORMAL
CRITICAL NOTIFICATION DATE/TIME: NORMAL
CRITICAL NOTIFICATION DATE/TIME: NORMAL
CRITICAL NOTIFICATION: NORMAL
CRITICAL NOTIFICATION: NORMAL
CRITICAL VALUE READ BACK: YES
CRITICAL VALUE READ BACK: YES
DATE, STOOL #1: NORMAL
EOSINOPHIL # BLD: 0.3 K/UL (ref 0–0.4)
EOSINOPHILS RELATIVE PERCENT: 3 % (ref 1–4)
ERYTHROCYTE [DISTWIDTH] IN BLOOD BY AUTOMATED COUNT: 13.7 % (ref 12.5–15.4)
FIO2: 28
FIO2: 40
GFR, ESTIMATED: 54 ML/MIN/1.73M2
GLUCOSE SERPL-MCNC: 151 MG/DL (ref 70–99)
HCO3 VENOUS: 39 MMOL/L (ref 22–29)
HCO3 VENOUS: 40.7 MMOL/L (ref 22–29)
HCT VFR BLD AUTO: 32.9 % (ref 36–46)
HEMOCCULT SP1 STL QL: NEGATIVE
HGB BLD-MCNC: 10.9 G/DL (ref 12–16)
INR PPP: 2.7
LYMPHOCYTES NFR BLD: 1.4 K/UL (ref 1–4.8)
LYMPHOCYTES RELATIVE PERCENT: 13 % (ref 24–44)
M PNEUMO IGM SER QL IA: 0.27
MAGNESIUM SERPL-MCNC: 2 MG/DL (ref 1.6–2.6)
MCH RBC QN AUTO: 32.8 PG (ref 26–34)
MCHC RBC AUTO-ENTMCNC: 33.2 G/DL (ref 31–37)
MCV RBC AUTO: 99 FL (ref 80–100)
MICROORGANISM SPEC CULT: NO GROWTH
MODE: ABNORMAL
MONOCYTES NFR BLD: 0.8 K/UL (ref 0.1–1.2)
MONOCYTES NFR BLD: 7 % (ref 2–11)
NEUTROPHILS NFR BLD: 76 % (ref 36–66)
NEUTS SEG NFR BLD: 8.3 K/UL (ref 1.8–7.7)
O2 DELIVERY DEVICE: ABNORMAL
O2 SAT, VEN: 34.8 % (ref 60–85)
O2 SAT, VEN: 94.8 % (ref 60–85)
PCO2 VENOUS: 60.3 MM HG (ref 41–51)
PCO2 VENOUS: 69.3 MM HG (ref 41–51)
PH VENOUS: 7.36 (ref 7.32–7.43)
PH VENOUS: 7.44 (ref 7.32–7.43)
PLATELET # BLD AUTO: 423 K/UL (ref 140–450)
PMV BLD AUTO: 7.3 FL (ref 6–12)
PO2 VENOUS: 23 MM HG (ref 30–50)
PO2 VENOUS: 75.3 MM HG (ref 30–50)
POSITIVE BASE EXCESS, VEN: 11 MMOL/L (ref 0–3)
POSITIVE BASE EXCESS, VEN: 13.7 MMOL/L (ref 0–3)
POTASSIUM SERPL-SCNC: 3.6 MMOL/L (ref 3.7–5.3)
PROTHROMBIN TIME: 26.6 SEC (ref 9.4–12.6)
RBC # BLD AUTO: 3.33 M/UL (ref 4–5.2)
SODIUM SERPL-SCNC: 130 MMOL/L (ref 135–144)
SPECIMEN DESCRIPTION: NORMAL
TIME, STOOL #1: 1740
WBC OTHER # BLD: 10.8 K/UL (ref 3.5–11)

## 2024-10-14 PROCEDURE — 6370000000 HC RX 637 (ALT 250 FOR IP)

## 2024-10-14 PROCEDURE — 85610 PROTHROMBIN TIME: CPT

## 2024-10-14 PROCEDURE — 94660 CPAP INITIATION&MGMT: CPT

## 2024-10-14 PROCEDURE — 2060000000 HC ICU INTERMEDIATE R&B

## 2024-10-14 PROCEDURE — 82272 OCCULT BLD FECES 1-3 TESTS: CPT

## 2024-10-14 PROCEDURE — 2700000000 HC OXYGEN THERAPY PER DAY

## 2024-10-14 PROCEDURE — 94761 N-INVAS EAR/PLS OXIMETRY MLT: CPT

## 2024-10-14 PROCEDURE — 2580000003 HC RX 258

## 2024-10-14 PROCEDURE — 71045 X-RAY EXAM CHEST 1 VIEW: CPT

## 2024-10-14 PROCEDURE — 82803 BLOOD GASES ANY COMBINATION: CPT

## 2024-10-14 PROCEDURE — 85025 COMPLETE CBC W/AUTO DIFF WBC: CPT

## 2024-10-14 PROCEDURE — 6360000002 HC RX W HCPCS

## 2024-10-14 PROCEDURE — 6360000002 HC RX W HCPCS: Performed by: INTERNAL MEDICINE

## 2024-10-14 PROCEDURE — 99232 SBSQ HOSP IP/OBS MODERATE 35: CPT | Performed by: FAMILY MEDICINE

## 2024-10-14 PROCEDURE — 2580000003 HC RX 258: Performed by: INTERNAL MEDICINE

## 2024-10-14 PROCEDURE — 36415 COLL VENOUS BLD VENIPUNCTURE: CPT

## 2024-10-14 PROCEDURE — 83735 ASSAY OF MAGNESIUM: CPT

## 2024-10-14 PROCEDURE — 80048 BASIC METABOLIC PNL TOTAL CA: CPT

## 2024-10-14 RX ORDER — PANTOPRAZOLE SODIUM 40 MG/1
40 TABLET, DELAYED RELEASE ORAL
Status: DISCONTINUED | OUTPATIENT
Start: 2024-10-15 | End: 2024-10-16 | Stop reason: HOSPADM

## 2024-10-14 RX ORDER — WARFARIN SODIUM 2.5 MG/1
2.5 TABLET ORAL
Status: COMPLETED | OUTPATIENT
Start: 2024-10-14 | End: 2024-10-14

## 2024-10-14 RX ADMIN — WARFARIN SODIUM 2.5 MG: 2.5 TABLET ORAL at 20:23

## 2024-10-14 RX ADMIN — HYDROCODONE BITARTRATE AND ACETAMINOPHEN 2 TABLET: 5; 325 TABLET ORAL at 20:22

## 2024-10-14 RX ADMIN — TORSEMIDE 20 MG: 20 TABLET ORAL at 08:03

## 2024-10-14 RX ADMIN — CEFEPIME 2000 MG: 2 INJECTION, POWDER, FOR SOLUTION INTRAVENOUS at 20:21

## 2024-10-14 RX ADMIN — SODIUM CHLORIDE, PRESERVATIVE FREE 10 ML: 5 INJECTION INTRAVENOUS at 20:22

## 2024-10-14 RX ADMIN — HYDROCODONE BITARTRATE AND ACETAMINOPHEN 2 TABLET: 5; 325 TABLET ORAL at 14:01

## 2024-10-14 RX ADMIN — VENLAFAXINE HYDROCHLORIDE 150 MG: 150 CAPSULE, EXTENDED RELEASE ORAL at 08:02

## 2024-10-14 RX ADMIN — MORPHINE SULFATE 4 MG: 4 INJECTION, SOLUTION INTRAMUSCULAR; INTRAVENOUS at 15:48

## 2024-10-14 RX ADMIN — HYDROCODONE BITARTRATE AND ACETAMINOPHEN 1 TABLET: 5; 325 TABLET ORAL at 08:02

## 2024-10-14 RX ADMIN — ATORVASTATIN CALCIUM 40 MG: 40 TABLET, FILM COATED ORAL at 08:03

## 2024-10-14 RX ADMIN — CEFEPIME 2000 MG: 2 INJECTION, POWDER, FOR SOLUTION INTRAVENOUS at 08:13

## 2024-10-14 RX ADMIN — METOPROLOL SUCCINATE 50 MG: 50 TABLET, EXTENDED RELEASE ORAL at 08:02

## 2024-10-14 RX ADMIN — SODIUM CHLORIDE, PRESERVATIVE FREE 10 ML: 5 INJECTION INTRAVENOUS at 08:03

## 2024-10-14 RX ADMIN — MORPHINE SULFATE 4 MG: 4 INJECTION, SOLUTION INTRAMUSCULAR; INTRAVENOUS at 11:09

## 2024-10-14 ASSESSMENT — PAIN SCALES - GENERAL
PAINLEVEL_OUTOF10: 7
PAINLEVEL_OUTOF10: 6
PAINLEVEL_OUTOF10: 7

## 2024-10-14 ASSESSMENT — PAIN DESCRIPTION - ORIENTATION: ORIENTATION: LOWER

## 2024-10-14 ASSESSMENT — PAIN DESCRIPTION - LOCATION
LOCATION: BACK
LOCATION: BACK

## 2024-10-14 ASSESSMENT — PAIN DESCRIPTION - DESCRIPTORS: DESCRIPTORS: ACHING

## 2024-10-14 ASSESSMENT — PAIN - FUNCTIONAL ASSESSMENT: PAIN_FUNCTIONAL_ASSESSMENT: ACTIVITIES ARE NOT PREVENTED

## 2024-10-14 NOTE — CARE COORDINATION
Message sent to [facility] for referral, awaiting response    1542 Rec'd message from [facility] they can accept.

## 2024-10-14 NOTE — PROGRESS NOTES
Pharmacy Note  Warfarin Consult follow-up      Recent Labs     10/14/24  0535   INR 2.7     Recent Labs     10/12/24  0222 10/13/24  0226 10/14/24  0535   HGB 9.3* 9.4* 10.9*   HCT 27.9* 28.3* 32.9*    [phone]       Significant Drug-Drug Interactions:  Azithromycin, cefepime        Notes:                   INR therapeutic at 2.7.  Will give 2.5 mg today.   Daily PT/INR while inpatient.     Maryam Paulson, PharmD 10/14/2024 11:23 AM

## 2024-10-14 NOTE — PLAN OF CARE
Problem: Safety - Adult  Goal: Free from fall injury  10/13/2024 2110 by Malu Narayan, RN  Outcome: Progressing  Flowsheets (Taken 10/13/2024 2110)  Free From Fall Injury: Instruct family/caregiver on patient safety  10/13/2024 0732 by Milagro Ross, RN  Outcome: Progressing     Problem: Chronic Conditions and Co-morbidities  Goal: Patient's chronic conditions and co-morbidity symptoms are monitored and maintained or improved  Outcome: Progressing  Flowsheets (Taken 10/13/2024 2110)  Care Plan - Patient's Chronic Conditions and Co-Morbidity Symptoms are Monitored and Maintained or Improved:   Monitor and assess patient's chronic conditions and comorbid symptoms for stability, deterioration, or improvement   Collaborate with multidisciplinary team to address chronic and comorbid conditions and prevent exacerbation or deterioration     Problem: Discharge Planning  Goal: Discharge to home or other facility with appropriate resources  Outcome: Progressing  Flowsheets (Taken 10/13/2024 2110)  Discharge to home or other facility with appropriate resources:   Identify barriers to discharge with patient and caregiver   Arrange for needed discharge resources and transportation as appropriate   Identify discharge learning needs (meds, wound care, etc)     Problem: Gastrointestinal - Adult  Goal: Maintains or returns to baseline bowel function  Outcome: Progressing  Flowsheets (Taken 10/13/2024 2110)  Maintains or returns to baseline bowel function:   Assess bowel function   Encourage oral fluids to ensure adequate hydration   Administer IV fluids as ordered to ensure adequate hydration   Administer ordered medications as needed  Goal: Maintains adequate nutritional intake  Outcome: Progressing  Flowsheets (Taken 10/13/2024 2110)  Maintains adequate nutritional intake:   Monitor percentage of each meal consumed   Assist with meals as needed   Identify factors contributing to decreased intake, treat as appropriate

## 2024-10-14 NOTE — PROGRESS NOTES
10/14/24 0845   Care Plan - Respiratory Goals   Achieves optimal ventilation and oxygenation Assess for changes in respiratory status;Oxygen supplementation based on oxygen saturation or arterial blood gases;Assess and instruct to report shortness of breath or any respiratory difficulty;Respiratory therapy support as indicated

## 2024-10-14 NOTE — PROGRESS NOTES
[facility] Residency  Inpatient Service     Progress Note  10/14/2024    9:55 AM    Name:   Yari Stokes  MRN:     [id number]     Acct:      [id number]   Room:   [address] [place] [address] Day:  4  Admit Date:  10/10/2024  6:05 PM    PCP:   Tay Angel DO  Code Status:  Full Code    Subjective:     Overnight events: No significant events.    Pt was examined at bedside. Patient denies chest pain, shortness of breath, palpitations while supine. She endorsed palpitations when changing positions to sit in a chair yesterday. Patient endorsed consumption of oral supplement and meals. Patient endorsed urinary frequency.    Medications:     Allergies:    Allergies   Allergen Reactions    Diphenhydramine-Phenylephrine Rash    Sudafed [Pseudoephedrine]     Wellbutrin [Bupropion]        Current Meds:   Scheduled Meds:    lidocaine  1 patch TransDERmal Daily    metoprolol succinate  50 mg Oral Daily    sodium chloride flush  5-40 mL IntraVENous 2 times per day    cefepime  2,000 mg IntraVENous Q12H    atorvastatin  40 mg Oral Daily    torsemide  20 mg Oral Daily    venlafaxine  150 mg Oral Daily with breakfast    warfarin placeholder: dosing by pharmacy   Other RX Placeholder     Continuous Infusions:    sodium chloride       PRN Meds: morphine, HYDROcodone-acetaminophen **OR** HYDROcodone 5 mg - acetaminophen, levalbuterol, calcium carbonate, sodium chloride flush, sodium chloride, ondansetron **OR** ondansetron, polyethylene glycol, acetaminophen **OR** acetaminophen, benzonatate    ROS:   ROS is negative except for points noted above.    Data:     Vitals:  /71   Pulse (!) 102   Temp 97.5 °F (36.4 °C) (Axillary)   Resp 20   Ht 1.651 m (5' 5\")   Wt 96 kg (211 lb 10.3 oz)   LMP  (LMP Unknown)   SpO2 98%   BMI 35.22 kg/m²   Temp (24hrs), Av.8 °F (36.6 °C), Min:97.3 °F (36.3 °C), Max:98.1 °F (36.7 °C)    Recent Labs     10/13/24  2038   POCGLU 212*       I/O  organism 10/12/2024 Yes    Acute cystitis without hematuria 10/11/2024 Yes    Anemia, macrocytic 10/11/2024 Yes    Ambulatory dysfunction 10/11/2024 Yes    History of pulmonary embolism 10/11/2024 Yes    Chronic anticoagulation 10/11/2024 Yes    Stage 3b chronic kidney disease (CKD) ([facility]) 10/11/2024 Yes    Chronic obstructive pulmonary disease (HCC) 10/11/2024 Yes    Moderate protein-calorie malnutrition ([facility]) 10/11/2024 Yes    Exposure to second hand smoke 10/11/2024 Yes    Chronic heart failure with preserved ejection fraction (HCC) 10/11/2024 Yes    Hyponatremia 10/11/2024 Yes    Chronic respiratory failure with hypoxia and hypercapnia 10/12/2024 Yes    Chronic midline low back pain without sciatica 10/13/2024 Yes    Tachycardia 10/13/2024 Yes       Plan:   Acute on chronic respiratory failure with hypoxia and hypercarbia, improving  Pneumonia, lobar  COPD  Mycoplasma antibodies negative   Blood culture no growth in 3 days  Legionella and covid negative  Pulmonology following  Monitor: SpO2 goal sat> 88%,   Continue nocturnal BIPAP  Outpt sleep study and tx with PAP nocturnal to prevent recurrent hypercapnia   Repeat CXR  Outpatient F/U in 2 to 4 weeks  PFT and F/U CT in 6 weeks  Continue cefepime day 4/7 days- can switch to Levaquin on DC   Continue Xopenex  Continue to encourage use flutter valve and incentive spirometry  PT/OT on board; referral to [facility] sent  Wean O2 as tolerated, currently on 2 L  Anticipate DC in next 48-72 hours     Acute cystitis without hematuria  Continue cefepime as above     Lower back pain  History of ambulatory dysfunction  Continue daily use of lidocaine 4% patch   Continue to encourage patient to change positions, sit on recliner  Continue pain regimen  Tylenol 650 mg Q6H for mild pain  Norco 1 tablet Q6H for moderate pain  Norco 2 tablets Q6H  for severe pain  Morphine 4 mg Q4H for breakthrough pain  PT/OT      Chronic HFpEF  History of pulmonary embolism  Chronic

## 2024-10-14 NOTE — DISCHARGE INSTR - COC
Continuity of Care Form    Patient Name: Yari Acuña   :  1955  MRN:  [id number]    Admit date:  10/10/2024  Discharge date:  ***    Code Status Order: Full Code   Advance Directives:   Advance Care Flowsheet Documentation             Admitting Physician:  Bob Latham MD  PCP: Tay Angel DO    Discharging Nurse: ***  Discharging Hospital Unit/Room#: 340/340-01  Discharging Unit Phone Number: ***    Emergency Contact:   Extended Emergency Contact Information  Primary Emergency Contact: MARYLU ACUÑA  Home Phone: [phone]  Relation: Spouse  Preferred language: English   needed? No    Past Surgical History:  Past Surgical History:   Procedure Laterality Date    CERVICAL LAMINECTOMY      LUMBAR LAMINECTOMY         Immunization History:   Immunization History   Administered Date(s) Administered    COVID-19, MODERNA Bivalent, (age 12y+), IM, 50 mcg/0.5 mL 2022, 2022       Active Problems:  Patient Active Problem List   Diagnosis Code    Acute congestive heart failure (HCC) I50.9    Acute pulmonary edema J81.0    RICHAR (acute kidney injury) ([facility]) N17.9    Urinary retention R33.9    Arterial hypotension I95.9    Hydronephrosis N13.30    Pulmonary vascular congestion R09.89    Acute renal failure with tubular necrosis ([facility]) N17.0    Hypoglycemia E16.2    Acute cystitis without hematuria N30.00    Acute heart failure with preserved ejection fraction ([facility]) I50.31    Acute on chronic respiratory failure with hypoxia and hypercapnia J96.21, J96.22    Anemia, macrocytic D53.9    Ambulatory dysfunction R26.2    History of pulmonary embolism Z86.711    Chronic anticoagulation Z79.01    Hypercoagulable state ([facility]) D68.59    Hypercalcemia E83.52    Stage 3b chronic kidney disease (CKD) (HCC) N18.32    Chronic obstructive pulmonary disease (HCC) J44.9    E. coli UTI N39.0, B96.20    Steroid-induced hyperglycemia R73.9, T38.0X5A    Moderate protein-calorie malnutrition (HCC) E44.0    Exposure  Nutrition Therapy:   { OTTONIEL Diet List:210536420}    Routes of Feeding: {[facility] DME Other Feedings:476717173}  Liquids: {Slp liquid thickness:12255}  Daily Fluid Restriction: {CHP DME Yes amt example:844275118}  Last Modified Barium Swallow with Video (Video Swallowing Test): {Done Not Done Date:}    Treatments at the Time of Hospital Discharge:   Respiratory Treatments: ***  Oxygen Therapy:  {Therapy; copd oxygen:99837}  Ventilator:    { CC Vent List:105746286}    Rehab Therapies: {THERAPEUTIC INTERVENTION:3722526850}  Weight Bearing Status/Restrictions: { CC Weight Bearin}  Other Medical Equipment (for information only, NOT a DME order):  {EQUIPMENT:410175816}  Other Treatments: ***    Patient's personal belongings (please select all that are sent with patient):  {[facility] DME Belongings:177880874}    RN SIGNATURE:  {Esignature:188473704}    CASE MANAGEMENT/SOCIAL WORK SECTION    Inpatient Status Date: ***    Readmission Risk Assessment Score:  Readmission Risk              Risk of Unplanned Readmission:  19           Discharging to Facility/ Agency   Name:   Address:  Phone:  Fax:    Dialysis Facility (if applicable)   Name:  Address:  Dialysis Schedule:  Phone:  Fax:    / signature: {Esignature:680860578}    PHYSICIAN SECTION    Prognosis: Fair    Condition at Discharge: Stable    Rehab Potential (if transferring to Rehab): Poor    Recommended Labs or Other Treatments After Discharge: needs to follow up at Anti-coag clinic for INR- warfarin     Physician Certification: I certify the above information and transfer of Yari Stokes  is necessary for the continuing treatment of the diagnosis listed and that she requires Home Care for greater 30 days.     Update Admission H&P: No change in H&P    PHYSICIAN SIGNATURE:  Electronically signed by Rajeev Browning MD on 10/16/24 at 4:50 PM EDT

## 2024-10-14 NOTE — DISCHARGE INSTRUCTIONS
Date of admission: 10/10/2024  Date of discharge: 10/14/24    Your care was provided by the attending and resident physicians of the [facility] Medicine Residency Program. The primary encounter diagnosis was Pneumonia of right lower lobe due to infectious organism. Diagnoses of Altered mental status, unspecified altered mental status type and CECILIA (obstructive sleep apnea) were also pertinent to this visit.    FOLLOW-UP  - Follow up with your primary care physician Tay Angel in 2 days.  - Follow up with your pulmonary in 4  weeks.  - Follow up with your warfarin clinic in 2 days.    MEDICATIONS  - We have started you on oral levaquin tablets to complete a 7 day treatment of for your urinary tract infection and pneumonia. You have 2 more days. Please take the next does of levaquin on 10/17/2024 and the final dose on 10/19/2024.  -  your levofloxacin from the pharmacy and take as directed.  - Continue taking your other home medications as directed.    ADDITIONAL INSTRUCTIONS  - Please return immediately to the [facility] Emergency Department if confusion, difficulty breathing, and any other concerning symptoms.

## 2024-10-14 NOTE — PROGRESS NOTES
[facility] PULMONARY,CRITICAL CARE & SLEEP   Luis Armandosaurav Jeronimo MD/Yayo CALLAHAN-BC, NP-C       Ashley LUONG NP-C      Jeff LUONG NP-C                                  Pulmonary Critical Care Progress Note    Patient - Yari Stokes   Age - 69 y.o.   - 1955  MRN - [id number]  [facility] [id number]  Date of Admission - 10/10/2024  6:05 PM    Consulting Service/Physician:       Primary Care Physician: Tay Angel DO    SUBJECTIVE:     Chief Complaint:   Chief Complaint   Patient presents with    Altered Mental Status   Altered mental status  Subjective:    Patient still a little cloudy mentally.  She has a lot of trouble remembering any of her medical history.  She mostly relies on her  to take care of her.  She has been nonambulatory for 5 to 7 years wheelchair-bound.  She attributes this to spinal stenosis and neuropathy.    She does tell me that she saw Dr. Bolanos years ago.  She has been started on nocturnal oxygen because she was intolerant to CPAP therapy after having a sleep study years ago at [facility] showing sleep apnea.    She has a little bit of cough that is nonproductive.    She does have chronic heart failure and follows with the cardiology team at [facility].  She has been told that she has a stiff left ventricle.    Overall, she feels that she is getting better.  She did tolerate BiPAP here overnight the past several nights.    VITALS  /60   Pulse (!) 102   Temp 97.5 °F (36.4 °C) (Axillary)   Resp 20   Ht 1.651 m (5' 5\")   Wt 96 kg (211 lb 10.3 oz)   LMP  (LMP Unknown)   SpO2 (!) 85%   BMI 35.22 kg/m²   Wt Readings from Last 3 Encounters:   10/11/24 96 kg (211 lb 10.3 oz)   23 106.3 kg (234 lb 5.6 oz)   23 98 kg (216 lb 0.8 oz)     I/O (24 Hours)    Intake/Output Summary (Last 24 hours) at 10/14/2024 0758  Last data filed at 10/14/2024 0718  Gross per 24 hour   Intake 337.17 ml

## 2024-10-15 LAB
ANION GAP SERPL CALCULATED.3IONS-SCNC: 9 MMOL/L (ref 9–17)
BASOPHILS # BLD: 0 K/UL (ref 0–0.2)
BASOPHILS NFR BLD: 0 % (ref 0–2)
BUN SERPL-MCNC: 39 MG/DL (ref 8–23)
CALCIUM SERPL-MCNC: 9.8 MG/DL (ref 8.6–10.4)
CHLORIDE SERPL-SCNC: 91 MMOL/L (ref 98–107)
CO2 SERPL-SCNC: 35 MMOL/L (ref 20–31)
CREAT SERPL-MCNC: 1.4 MG/DL (ref 0.5–0.9)
EOSINOPHIL # BLD: 0.3 K/UL (ref 0–0.4)
EOSINOPHILS RELATIVE PERCENT: 3 % (ref 1–4)
ERYTHROCYTE [DISTWIDTH] IN BLOOD BY AUTOMATED COUNT: 13.7 % (ref 12.5–15.4)
GFR, ESTIMATED: 40 ML/MIN/1.73M2
GLUCOSE SERPL-MCNC: 155 MG/DL (ref 70–99)
HCT VFR BLD AUTO: 30.4 % (ref 36–46)
HGB BLD-MCNC: 10 G/DL (ref 12–16)
INR PPP: 1.9
LYMPHOCYTES NFR BLD: 1.1 K/UL (ref 1–4.8)
LYMPHOCYTES RELATIVE PERCENT: 10 % (ref 24–44)
MCH RBC QN AUTO: 32.7 PG (ref 26–34)
MCHC RBC AUTO-ENTMCNC: 32.8 G/DL (ref 31–37)
MCV RBC AUTO: 99.8 FL (ref 80–100)
MICROORGANISM SPEC CULT: NORMAL
MICROORGANISM SPEC CULT: NORMAL
MONOCYTES NFR BLD: 0.8 K/UL (ref 0.1–1.2)
MONOCYTES NFR BLD: 7 % (ref 2–11)
NEUTROPHILS NFR BLD: 80 % (ref 36–66)
NEUTS SEG NFR BLD: 8.6 K/UL (ref 1.8–7.7)
PLATELET # BLD AUTO: 354 K/UL (ref 140–450)
PMV BLD AUTO: 7.3 FL (ref 6–12)
POTASSIUM SERPL-SCNC: 3.9 MMOL/L (ref 3.7–5.3)
PROTHROMBIN TIME: 19.6 SEC (ref 9.4–12.6)
RBC # BLD AUTO: 3.05 M/UL (ref 4–5.2)
SERVICE CMNT-IMP: NORMAL
SERVICE CMNT-IMP: NORMAL
SODIUM SERPL-SCNC: 135 MMOL/L (ref 135–144)
SPECIMEN DESCRIPTION: NORMAL
SPECIMEN DESCRIPTION: NORMAL
WBC OTHER # BLD: 10.8 K/UL (ref 3.5–11)

## 2024-10-15 PROCEDURE — 99232 SBSQ HOSP IP/OBS MODERATE 35: CPT | Performed by: FAMILY MEDICINE

## 2024-10-15 PROCEDURE — 80048 BASIC METABOLIC PNL TOTAL CA: CPT

## 2024-10-15 PROCEDURE — 94660 CPAP INITIATION&MGMT: CPT

## 2024-10-15 PROCEDURE — 6370000000 HC RX 637 (ALT 250 FOR IP)

## 2024-10-15 PROCEDURE — 36415 COLL VENOUS BLD VENIPUNCTURE: CPT

## 2024-10-15 PROCEDURE — 85610 PROTHROMBIN TIME: CPT

## 2024-10-15 PROCEDURE — 85025 COMPLETE CBC W/AUTO DIFF WBC: CPT

## 2024-10-15 PROCEDURE — 2060000000 HC ICU INTERMEDIATE R&B

## 2024-10-15 PROCEDURE — 97535 SELF CARE MNGMENT TRAINING: CPT

## 2024-10-15 PROCEDURE — 2580000003 HC RX 258

## 2024-10-15 PROCEDURE — 97110 THERAPEUTIC EXERCISES: CPT

## 2024-10-15 PROCEDURE — 97530 THERAPEUTIC ACTIVITIES: CPT

## 2024-10-15 RX ORDER — LEVOFLOXACIN 750 MG/1
750 TABLET, FILM COATED ORAL DAILY
Status: DISCONTINUED | OUTPATIENT
Start: 2024-10-15 | End: 2024-10-15 | Stop reason: DRUGHIGH

## 2024-10-15 RX ORDER — WARFARIN SODIUM 5 MG/1
5 TABLET ORAL
Status: COMPLETED | OUTPATIENT
Start: 2024-10-15 | End: 2024-10-15

## 2024-10-15 RX ORDER — LEVOFLOXACIN 500 MG/1
500 TABLET, FILM COATED ORAL DAILY
Status: DISCONTINUED | OUTPATIENT
Start: 2024-10-15 | End: 2024-10-15

## 2024-10-15 RX ORDER — LEVOFLOXACIN 750 MG/1
750 TABLET, FILM COATED ORAL
Status: DISCONTINUED | OUTPATIENT
Start: 2024-10-15 | End: 2024-10-16 | Stop reason: HOSPADM

## 2024-10-15 RX ADMIN — ACETAMINOPHEN 650 MG: 325 TABLET ORAL at 15:22

## 2024-10-15 RX ADMIN — TORSEMIDE 20 MG: 20 TABLET ORAL at 09:00

## 2024-10-15 RX ADMIN — SODIUM CHLORIDE, PRESERVATIVE FREE 10 ML: 5 INJECTION INTRAVENOUS at 09:00

## 2024-10-15 RX ADMIN — WARFARIN SODIUM 5 MG: 5 TABLET ORAL at 17:06

## 2024-10-15 RX ADMIN — LEVOFLOXACIN 750 MG: 750 TABLET, FILM COATED ORAL at 10:07

## 2024-10-15 RX ADMIN — HYDROCODONE BITARTRATE AND ACETAMINOPHEN 1 TABLET: 5; 325 TABLET ORAL at 22:04

## 2024-10-15 RX ADMIN — VENLAFAXINE HYDROCHLORIDE 150 MG: 150 CAPSULE, EXTENDED RELEASE ORAL at 09:00

## 2024-10-15 RX ADMIN — ATORVASTATIN CALCIUM 40 MG: 40 TABLET, FILM COATED ORAL at 09:00

## 2024-10-15 RX ADMIN — PANTOPRAZOLE SODIUM 40 MG: 40 TABLET, DELAYED RELEASE ORAL at 05:32

## 2024-10-15 RX ADMIN — HYDROCODONE BITARTRATE AND ACETAMINOPHEN 2 TABLET: 5; 325 TABLET ORAL at 12:14

## 2024-10-15 RX ADMIN — METOPROLOL SUCCINATE 50 MG: 50 TABLET, EXTENDED RELEASE ORAL at 09:00

## 2024-10-15 RX ADMIN — HYDROCODONE BITARTRATE AND ACETAMINOPHEN 2 TABLET: 5; 325 TABLET ORAL at 05:32

## 2024-10-15 ASSESSMENT — PAIN DESCRIPTION - ORIENTATION
ORIENTATION: LOWER

## 2024-10-15 ASSESSMENT — PAIN SCALES - GENERAL
PAINLEVEL_OUTOF10: 7
PAINLEVEL_OUTOF10: 2
PAINLEVEL_OUTOF10: 3
PAINLEVEL_OUTOF10: 6
PAINLEVEL_OUTOF10: 8
PAINLEVEL_OUTOF10: 6

## 2024-10-15 ASSESSMENT — PAIN DESCRIPTION - DESCRIPTORS
DESCRIPTORS: ACHING
DESCRIPTORS: ACHING;DISCOMFORT

## 2024-10-15 ASSESSMENT — PAIN DESCRIPTION - LOCATION
LOCATION: BACK

## 2024-10-15 ASSESSMENT — PAIN - FUNCTIONAL ASSESSMENT
PAIN_FUNCTIONAL_ASSESSMENT: PREVENTS OR INTERFERES SOME ACTIVE ACTIVITIES AND ADLS
PAIN_FUNCTIONAL_ASSESSMENT: PREVENTS OR INTERFERES SOME ACTIVE ACTIVITIES AND ADLS

## 2024-10-15 ASSESSMENT — PAIN DESCRIPTION - FREQUENCY: FREQUENCY: CONTINUOUS

## 2024-10-15 ASSESSMENT — PAIN DESCRIPTION - ONSET: ONSET: GRADUAL

## 2024-10-15 NOTE — PROGRESS NOTES
Spiritual Health History and Assessment/Progress Note  [facility]    (P) Spiritual/Emotional Needs,  ,  ,      Name: Yari Stokes MRN: [id number]    Age: 69 y.o.     Sex: female   Language: English   Congregational: Other   Pneumonia of right lower lobe due to infectious organism     Date: 10/15/2024            Total Time Calculated: (P) 20 min              Spiritual Assessment began in [address]        Referral/Consult From: (P) Rounding   Encounter Overview/Reason: (P) Spiritual/Emotional Needs  Service Provided For: (P) Patient and family together    Rand, Belief, Meaning:   Patient identifies as spiritual and is connected with a rand tradition or spiritual practice  Family/Friends identify as spiritual and are connected with a rand tradition or spiritual practice      Importance and Influence:  Patient has spiritual/personal beliefs that influence decisions regarding their health  Family/Friends have spiritual/personal beliefs that influence decisions regarding the patient's health    Community:  Patient feels well-supported. Support system includes: Spouse/Partner  Family/Friends feel well-supported. Support system includes: Spouse/Partner    Assessment and Plan of Care:     Patient Interventions include: Facilitated expression of thoughts and feelings, Explored spiritual coping/struggle/distress, Engaged in theological reflection, Affirmed coping skills/support systems, and Facilitated life review and/ or legacy  Family/Friends Interventions include: Facilitated expression of thoughts and feelings, Explored spiritual coping/struggle/distress, and Facilitated life review and/or legacy    Patient Plan of Care: Spiritual Care available upon further referral  Family/Friends Plan of Care: Spiritual Care available upon further referral    Electronically signed by Chaplain Noris on 10/15/2024 at 2:38 PM

## 2024-10-15 NOTE — PROGRESS NOTES
Physical Therapy  Facility/Department: [address]  Physical Therapy Daily Progress Note    Name: Yari Stokes  : 1955  MRN: [id number]  Date of Service: 10/15/2024    Discharge Recommendations:  Patient would benefit from continued therapy after discharge   PT Equipment Recommendations  Equipment Needed: No  Other: pt has Joanna Stedy, lift chair, power w/c; continue to assess for needs      Patient Diagnosis(es): The primary encounter diagnosis was Pneumonia of right lower lobe due to infectious organism. Diagnoses of Altered mental status, unspecified altered mental status type and CECILIA (obstructive sleep apnea) were also pertinent to this visit.  Past Medical History:  has a past medical history of Anemia, Apnea, sleep, CAD (coronary artery disease), CHF (congestive heart failure) ([facility]), Chronic kidney disease, Depression, Diabetes mellitus ([facility]), Heart abnormality, Hyperlipidemia, Hyponatremia, Incontinence of urine, Obesity, SIAD (syndrome of inappropriate antidiuresis) ([facility]), Tinea corporis, and Vitamin D insufficiency.  Past Surgical History:  has a past surgical history that includes lumbar laminectomy and cervical laminectomy.    Assessment  Body Structures, Functions, Activity Limitations Requiring Skilled Therapeutic Intervention: Decreased functional mobility ;Decreased ROM;Decreased balance;Decreased endurance;Decreased safe awareness;Increased pain;Decreased strength;Decreased sensation;Decreased posture  Assessment: Pt lives w/  in level entry apartment and normally sleeps in flat sleep comfort bed, requires  assist and Joanna Easton for transfers w/ lift chair and requires assist for ADLs. Pt is non ambulatory and has access to power w/c but reports low back pain limit use this past week. Pt has hx peewee foot drop.  Currently, pt was on O2 and required MaxA x 2 for bed mobility and tolerated sitting edge of bed ~5 minutes, limited due to back pain. Pt did not transfer this date due

## 2024-10-15 NOTE — PROGRESS NOTES
[facility]  Inpatient Service     Progress Note  10/15/2024    10:32 AM    Name:   Yari Stokes  MRN:     [id number]     Acct:      [id number]   Room:   [address] [place] [address] Day:  5  Admit Date:  10/10/2024  6:05 PM    PCP:   Tay Angel DO  Code Status:  Full Code    Subjective:     Overnight events: No significant events overnight.    Pt was examined at bedside. Patient endorsed improved breathing. She denied SOB, chest pain, and palpitations. Patient endorsed is better with pain med regimen. Patient appeared to be struggling with words during conversation.       Medications:     Allergies:    Allergies   Allergen Reactions    Diphenhydramine-Phenylephrine Rash    Sudafed [Pseudoephedrine]     Wellbutrin [Bupropion]        Current Meds:   Scheduled Meds:    levoFLOXacin  750 mg Oral Q48H    pantoprazole  40 mg Oral QAM AC    lidocaine  1 patch TransDERmal Daily    metoprolol succinate  50 mg Oral Daily    sodium chloride flush  5-40 mL IntraVENous 2 times per day    atorvastatin  40 mg Oral Daily    torsemide  20 mg Oral Daily    venlafaxine  150 mg Oral Daily with breakfast    warfarin placeholder: dosing by pharmacy   Other RX Placeholder     Continuous Infusions:    sodium chloride       PRN Meds: morphine, HYDROcodone-acetaminophen **OR** HYDROcodone 5 mg - acetaminophen, levalbuterol, calcium carbonate, sodium chloride flush, sodium chloride, ondansetron **OR** ondansetron, polyethylene glycol, acetaminophen **OR** acetaminophen, benzonatate    ROS:   ROS is negative except for points noted above.    Data:     Vitals:  /61   Pulse (!) 113   Temp 97.7 °F (36.5 °C) (Oral)   Resp 19   Ht 1.651 m (5' 5\")   Wt 96 kg (211 lb 10.3 oz)   LMP  (LMP Unknown)   SpO2 100%   BMI 35.22 kg/m²   Temp (24hrs), Av.9 °F (36.6 °C), Min:97.5 °F (36.4 °C), Max:98.8 °F (37.1 °C)    Recent Labs     10/13/24  2038   POCGLU 212*       I/O

## 2024-10-15 NOTE — PLAN OF CARE
Nutrition Problem #1: Moderate malnutrition, In context of acute illness or injury  Intervention: Food and/or Nutrient Delivery: Continue Current Diet, Continue Oral Nutrition Supplement  Nutritional Goal: At least 50% PO intake prior to discharge

## 2024-10-15 NOTE — PROGRESS NOTES
Pharmacy Note - Renal dose adjustment made per P/T protocol    Original order:  Levaquin 750 mg every 24 hours    Estimated Creatinine Clearance: 42 mL/min (A) (based on SCr of 1.4 mg/dL (H)).    Recent Labs     10/13/24  0812 10/14/24  0737 10/15/24  0602   BUN 24* 28* 39*   CREATININE 1.1* 1.1* 1.4*       Renally adjusted order:  Levaquin 750 mg every 48 hours    Please call pharmacy with any questions.    Thank you,  Keith Graves [facility]  10/15/2024 9:29 AM

## 2024-10-15 NOTE — PLAN OF CARE
Problem: Safety - Adult  Goal: Free from fall injury  Outcome: Progressing  Flowsheets (Taken 10/13/2024 2110)  Free From Fall Injury: Instruct family/caregiver on patient safety     Problem: Chronic Conditions and Co-morbidities  Goal: Patient's chronic conditions and co-morbidity symptoms are monitored and maintained or improved  Outcome: Progressing  Flowsheets (Taken 10/13/2024 2110)  Care Plan - Patient's Chronic Conditions and Co-Morbidity Symptoms are Monitored and Maintained or Improved:   Monitor and assess patient's chronic conditions and comorbid symptoms for stability, deterioration, or improvement   Collaborate with multidisciplinary team to address chronic and comorbid conditions and prevent exacerbation or deterioration     Problem: Discharge Planning  Goal: Discharge to home or other facility with appropriate resources  Outcome: Progressing  Flowsheets (Taken 10/13/2024 2110)  Discharge to home or other facility with appropriate resources:   Identify barriers to discharge with patient and caregiver   Arrange for needed discharge resources and transportation as appropriate   Identify discharge learning needs (meds, wound care, etc)     Problem: Gastrointestinal - Adult  Goal: Maintains or returns to baseline bowel function  Outcome: Progressing  Flowsheets (Taken 10/14/2024 0742 by Adelso Bales)  Maintains or returns to baseline bowel function:   Assess bowel function   Encourage oral fluids to ensure adequate hydration  Goal: Maintains adequate nutritional intake  Outcome: Progressing  Flowsheets (Taken 10/13/2024 2110)  Maintains adequate nutritional intake:   Monitor percentage of each meal consumed   Assist with meals as needed   Identify factors contributing to decreased intake, treat as appropriate   Monitor intake and output, weight and lab values   Obtain nutritional consult as needed     Problem: Skin/Tissue Integrity - Adult  Goal: Skin integrity remains intact  Outcome:

## 2024-10-15 NOTE — PROGRESS NOTES
[facility] PULMONARY,CRITICAL CARE & SLEEP   Luis Armando Jeronimo MD/Yayo LUONG AGAOCTAVIAP-BC, NP-C       Ashley LUONG NP-C      Jeff LUONG NP-C                                  Pulmonary Critical Care Progress Note    Patient - Yari Stokes   Age - 69 y.o.   - 1955  MRN - [id number]  Acct # - [id number]  Date of Admission - 10/10/2024  6:05 PM    Consulting Service/Physician:       Primary Care Physician: Tay Angel DO    SUBJECTIVE:     Chief Complaint:   Chief Complaint   Patient presents with    Altered Mental Status   Altered mental status  Subjective:    Still being some episodic confusion probably exacerbated by recurrent hypercapnia and excessive use of narcotics.  We are going to back off on the narcotics and try to get her a little mentally clear.  She admits that she is getting better.  Her  agrees that her mental status and myoclonus has improved.  She is wearing the BiPAP at night.  She is complaining of diffuse itching probably from the Norco that she was taking.    VITALS  /61   Pulse 96   Temp 98.3 °F (36.8 °C) (Oral)   Resp 24   Ht 1.651 m (5' 5\")   Wt 96 kg (211 lb 10.3 oz)   LMP  (LMP Unknown)   SpO2 98%   BMI 35.22 kg/m²   Wt Readings from Last 3 Encounters:   10/11/24 96 kg (211 lb 10.3 oz)   23 106.3 kg (234 lb 5.6 oz)   23 98 kg (216 lb 0.8 oz)     I/O (24 Hours)    Intake/Output Summary (Last 24 hours) at 10/15/2024 1401  Last data filed at 10/15/2024 1039  Gross per 24 hour   Intake 1000 ml   Output 200 ml   Net 800 ml     Ventilator:   Settings  FiO2 : 40 %  Insp Rise Time (%): 3 %  Exam:   Physical Exam   Constitutional: Alert, cooperative, still somewhat foggy mentally, obese.   HENT: 2 L nasal cannula in place saturating 99 to 100%  Neck: Neck supple.  JVD noted  Cardiovascular: Irregular with soft murmur  Pulmonary/Chest: Rhonchi and wheezes better   abdominal: Soft.  Obese nontender  suppository 650 mg, 650 mg, Rectal, Q6H PRN, Balbir Dunn MD    benzonatate (TESSALON) capsule 100 mg, 100 mg, Oral, TID PRN, Balbir Dunn MD    atorvastatin (LIPITOR) tablet 40 mg, 40 mg, Oral, Daily, Balbir Dunn MD, 40 mg at 10/15/24 0900    torsemide (DEMADEX) tablet 20 mg, 20 mg, Oral, Daily, Balbir Dunn MD, 20 mg at 10/15/24 0900    venlafaxine (EFFEXOR XR) extended release capsule 150 mg, 150 mg, Oral, Daily with breakfast, Balbir Dunn MD, 150 mg at 10/15/24 0900    warfarin placeholder: dosing by pharmacy, , Other, RX Placeholder, Balbir Dunn MD    Lab Results:     Lab Results   Component Value Date    WBC 10.8 10/15/2024    HGB 10.0 (L) 10/15/2024    HCT 30.4 (L) 10/15/2024    MCV 99.8 10/15/2024     10/15/2024     Lab Results   Component Value Date    CALCIUM 9.8 10/15/2024     10/15/2024    K 3.9 10/15/2024    CO2 35 (H) 10/15/2024    CL 91 (L) 10/15/2024    BUN 39 (H) 10/15/2024    CREATININE 1.4 (H) 10/15/2024     No components found for: \"ABGSAMPLETYP\", \"ABGBODYTEMP\", \"ABGPHCORRFOR\", \"YBGIQZ7OPMNNG\", \"ABGPHCORRFOOR\", \"ABGPH\", \"ABGPCO2\", \"ABGPO2\", \"ABGBASEEXCES\", \"ABGBASEDEFIC\", \"ABGHCO3\", \"TZXE2UPN\", \"ABGENDTIDALC\", \"ABGALLENSTES\", \"ABGSPO2\", \"ABGSAMEPLESIT\", \"[id number]\", \"ABGOXYGENSOU\"  Lab Results   Component Value Date    INR 1.9 10/15/2024    PROTIME 19.6 (H) 10/15/2024       Radiology:   [unfilled]  My reading of film: Chest x-ray yesterday shows improvement but still patchy bilateral lung infiltrates.  Chronically elevated left hemidiaphragm      ASSESSMENT:     Acute on chronic hypoxic respiratory failure currently on 2 L but saturating 98 to 100%, does wear nocturnal oxygen for years in place of sleep apnea treatment originally prescribed by Dr. Bolanos  Multifocal pneumonia bilaterally  Acute on chronic hypercapnic respiratory failure-suspect pCO2 runs in the mid 50  Bronchiectasis noted on CT imaging  UTI-based on UA multiple bacteria

## 2024-10-15 NOTE — PROGRESS NOTES
Comprehensive Nutrition Assessment    Type and Reason for Visit:  RD Nutrition Re-Screen/LOS    Nutrition Recommendations/Plan:   Continue current diet, encourage PO intake  ONS TID  Monitor weight, intake, labs, skin     Malnutrition Assessment:  Malnutrition Status:  Moderate malnutrition (10/15/24 1414)    Context:  Acute Illness     Findings of the 6 clinical characteristics of malnutrition:  Energy Intake:  Mild decrease in energy intake (Comment)  Weight Loss:  Greater than 7.5% over 3 months     Body Fat Loss:  No significant body fat loss     Muscle Mass Loss:  No significant muscle mass loss    Fluid Accumulation:  Mild     Strength:  Not Performed    Nutrition Assessment:    Pt is admitted with pneumonia. Hx COPD. Length of stay nutrition assessment. No report of recent weight loss or poor appetite, though pt has c/o some nausea at times during admission. Suspect recent intake diminished. Also, noted 9% weight loss over the past month. Suspect weight loss could be partially d/t fluid shifts. No PO documentation at this time. ONS was started upon admission, pt is tolerating well. BMI is obese. No wounds are noted. Pt on 1500 ml fluid restriction.    Nutrition Related Findings:    +1 BUE, +1 BLE edema is noted. BUN 39, Cr 1.4, Glu 155, GFR 40. All other labs/meds reviewed. Wound Type: None       Current Nutrition Intake & Therapies:    Average Meal Intake: Unable to assess  Average Supplements Intake: Unable to assess  ADULT ORAL NUTRITION SUPPLEMENT; Breakfast, Lunch, Dinner, PM Snack, AM Snack; Standard High Calorie/High Protein Oral Supplement  ADULT DIET; Regular; 1500 ml    Anthropometric Measures:  Height: 165.1 cm (5' 5\")  Ideal Body Weight (IBW): 125 lbs (57 kg)    Current Body Weight: 96 kg (211 lb 10.3 oz), 169.3 %  Current BMI (kg/m2): 35.2  Usual Body Weight: 105.7 kg (233 lb)  % Weight Change (Calculated): -9.2  BMI Categories: Obese Class 2 (BMI 35.0 -39.9)    Estimated Daily Nutrient

## 2024-10-15 NOTE — PROGRESS NOTES
Occupational Therapy  Facility/Department: [address]   Daily Treatment Note  Patient Name: Yari Stokes        MRN: [id number]    : 1955    Date of Service: 10/15/2024    Discharge Recommendations  Discharge Recommendations: Patient would benefit from continued therapy after discharge    Assessment  Performance deficits / Impairments: Decreased functional mobility ;Decreased balance;Decreased ADL status;Decreased posture;Decreased endurance;Decreased strength;Decreased ROM;Decreased high-level IADLs;Decreased safe awareness;Decreased coordination;Decreased cognition  Assessment: Pt presents with decreased ADL status secondary to above noted deficits. At baseline, pt uses freda delgadillo with assist x1 from spouse for functional transfers and reports ability to engage in all ADLs with assistance required mostly for LB ADLs/toileting tasks however at this time, pt requiring high levels of physical assist x2 for bed mobility tasks and with limited activity tolerance to perform ADL tasks. Pt to benefit from continued therapy services while hospitalized to maximize pt's safety and independence in performing functional tasks. At this time, pt has not demonstrated the functional ability to safely return to prior living arrangements, continued skilled OT intervention recommended prior to an eventual return to home.  Prognosis: Good  Decision Making: Medium Complexity  REQUIRES OT FOLLOW-UP: Yes  Activity Tolerance  Activity Tolerance: Patient limited by fatigue;Patient limited by pain  Safety Devices  Type of Devices: Call light within reach;Nurse notified;Left in bed;Bed alarm in place  Restraints  Restraints Initially in Place: No    Restrictions/Precautions  Restrictions/Precautions  Restrictions/Precautions: Fall Risk;Up as Tolerated  Required Braces or Orthoses?: No  Position Activity Restriction  Other position/activity restrictions: non ambulatory; hx Freda Robbinjb to lift chair at home and home  bilaterally x15    Patient Education  Patient Education  Education Given To: Patient  Education Provided: Equipment;Energy Conservation;Fall Prevention Strategies;ADL Adaptive Strategies;Role of Therapy;Plan of Care (Bed Mobility Techniques, BUE Therex, Pursed Lip Breathing Techniques)  Education Method: Demonstration;Verbal  Barriers to Learning: None  Education Outcome: Verbalized understanding;Continued education needed    Goals  Short Term Goals  Time Frame for Short Term Goals: 14 visits  Short Term Goal 1: Pt to tolerate sitting EOB for >15 mins at SBA x1 for improved performance/participation in ADLs  Short Term Goal 2: Pt to complete bed mobility at MIN A x1  Short Term Goal 3: Pt to complete UB ADLs at setup while seated with back support  Short Term Goal 4: Pt to tolerate >15 mins of BUE HEP (AROM/AAROM/PROM/FMC/GMC) to support improved functional strength and endurance needed for ADLs and ADL transfers    Plan  Occupational Therapy Plan  Times Per Week: 5-6x/wk  Times Per Day: Once a day  Current Treatment Recommendations: Strengthening, ROM, Balance training, Functional mobility training, Endurance training, Safety education & training, Patient/Caregiver education & training, Equipment evaluation, education, & procurement, Self-Care / ADL, Pain management, Modalities, Positioning, Coordination training    AM-PAC Daily Activities Inpatient  AM-PAC Daily Activity - Inpatient   How much help is needed for putting on and taking off regular lower body clothing?: Total  How much help is needed for bathing (which includes washing, rinsing, drying)?: A Lot  How much help is needed for toileting (which includes using toilet, bedpan, or urinal)?: Total  How much help is needed for putting on and taking off regular upper body clothing?: A Lot  How much help is needed for taking care of personal grooming?: A Lot  How much help for eating meals?: A Little  AM-PAC Inpatient Daily Activity Raw Score: 11  AM-PAC

## 2024-10-15 NOTE — PLAN OF CARE
Problem: Chronic Conditions and Co-morbidities  Goal: Patient's chronic conditions and co-morbidity symptoms are monitored and maintained or improved  Outcome: Progressing  Flowsheets (Taken 10/13/2024 2110 by Malu Narayan, RN)  Care Plan - Patient's Chronic Conditions and Co-Morbidity Symptoms are Monitored and Maintained or Improved:   Monitor and assess patient's chronic conditions and comorbid symptoms for stability, deterioration, or improvement   Collaborate with multidisciplinary team to address chronic and comorbid conditions and prevent exacerbation or deterioration     Problem: Metabolic/Fluid and Electrolytes - Adult  Goal: Electrolytes maintained within normal limits  Outcome: Progressing  Flowsheets (Taken 10/15/2024 1341)  Electrolytes maintained within normal limits:   Monitor labs and assess patient for signs and symptoms of electrolyte imbalances   Administer electrolyte replacement as ordered   Monitor response to electrolyte replacements, including repeat lab results as appropriate     Problem: Respiratory - Adult  Goal: Achieves optimal ventilation and oxygenation  Outcome: Adequate for Discharge  Flowsheets (Taken 10/14/2024 0845 by Gini Villatoro, RCP)  Achieves optimal ventilation and oxygenation:   Assess for changes in respiratory status   Oxygen supplementation based on oxygen saturation or arterial blood gases   Assess and instruct to report shortness of breath or any respiratory difficulty   Respiratory therapy support as indicated

## 2024-10-15 NOTE — DISCHARGE SUMMARY
[facility]  Inpatient Service    Discharge Summary     Patient ID: Yari Stokes  :  1955   MRN: [id number]     ACCOUNT:  [id number]   Patient's PCP: Tay Angel DO  Admit Date: 10/10/2024   Discharge Date: 10/16/2024  Length of Stay: 6  Code Status:  Full Code  Admitting Physician: Bob Latham MD  Discharge Physician: Bob Latham MD     Active Discharge Diagnoses:     Hospital Problem Lists:  Principal Problem:    Pneumonia of right lower lobe due to infectious organism  Active Problems:    Acute cystitis without hematuria    Anemia, macrocytic    Ambulatory dysfunction    History of pulmonary embolism    Chronic anticoagulation    Stage 3b chronic kidney disease (CKD) (HCC)    Chronic obstructive pulmonary disease (HCC)    Moderate protein-calorie malnutrition (HCC)    Exposure to second hand smoke    Chronic heart failure with preserved ejection fraction (HCC)    Hyponatremia    Chronic respiratory failure with hypoxia and hypercapnia    Chronic midline low back pain without sciatica    Tachycardia  Resolved Problems:    Acute respiratory failure with hypoxia and hypercarbia    Troponin level elevated    Pneumonia, lobar (HCC)    Bandemia    Nonsmoker    Hyperkalemia      Admission Condition:  poor     Discharged Condition: good    Hospital Stay:     Hospital Course:  Yari Stokes is a 69 y.o. female with a PMH of COPD(baseline 3L O3), diastolic CHF, diabetes, HTN, Depression, PNA, PE(on warfarin),  who presented to ED with complaint of AMS.    In the ED, initial vitals were notable for absence of fever but presence of hypotension plus tachycardia; she was saturating 83% on room air. Patient received 6 L of oxygen via NC; saturation increased to 93%. ABG was notable for  respiratory acidosis and hypercapnia. CBC was notable for leukocytosis, neutrophilia, and macrocytic anemia. Troponin was elevated. UA is positive for large  [phone]  [address] [place] [id number]      Phone: [phone]   levalbuterol 0.63 MG/3ML nebulization  levoFLOXacin 750 MG tablet  metoprolol succinate 50 MG extended release tablet         Time spent on discharge is less than 30 minutes in patient examination, evaluation, counseling as well as medication reconciliation, prescriptions for required medications, discharge plan and follow up.    Discharge plan was discussed with MD Apple Collier MD  Family Medicine Resident, PGY-1   10/16/2024  5:55 PM    Senior Resident Attestation:    I have independently seen Yari Stokes and discussed the assessment and plan with the intern listed above and the attending Bob Latham MD. I have reviewed the note and have included any edits or additional information above.     Rajeev Browning MD  Family Medicine Resident, PGY-2   10/16/2024  6:02 PM     Thank you Tay Matias DO for the opportunity to be involved in this patient's care.

## 2024-10-16 VITALS
BODY MASS INDEX: 35.26 KG/M2 | DIASTOLIC BLOOD PRESSURE: 62 MMHG | OXYGEN SATURATION: 99 % | HEIGHT: 65 IN | SYSTOLIC BLOOD PRESSURE: 104 MMHG | WEIGHT: 211.64 LBS | HEART RATE: 113 BPM | RESPIRATION RATE: 25 BRPM | TEMPERATURE: 98.2 F

## 2024-10-16 LAB
ANION GAP SERPL CALCULATED.3IONS-SCNC: 5 MMOL/L (ref 9–17)
BASOPHILS # BLD: 0.1 K/UL (ref 0–0.2)
BASOPHILS NFR BLD: 1 % (ref 0–2)
BUN SERPL-MCNC: 44 MG/DL (ref 8–23)
CALCIUM SERPL-MCNC: 9.8 MG/DL (ref 8.6–10.4)
CHLORIDE SERPL-SCNC: 89 MMOL/L (ref 98–107)
CO2 SERPL-SCNC: 38 MMOL/L (ref 20–31)
CREAT SERPL-MCNC: 1.5 MG/DL (ref 0.5–0.9)
EOSINOPHIL # BLD: 0.3 K/UL (ref 0–0.4)
EOSINOPHILS RELATIVE PERCENT: 2 % (ref 1–4)
ERYTHROCYTE [DISTWIDTH] IN BLOOD BY AUTOMATED COUNT: 13.6 % (ref 12.5–15.4)
GFR, ESTIMATED: 37 ML/MIN/1.73M2
GLUCOSE SERPL-MCNC: 146 MG/DL (ref 70–99)
HCT VFR BLD AUTO: 29.9 % (ref 36–46)
HGB BLD-MCNC: 9.8 G/DL (ref 12–16)
INR PPP: 2.6
LYMPHOCYTES NFR BLD: 1.2 K/UL (ref 1–4.8)
LYMPHOCYTES RELATIVE PERCENT: 11 % (ref 24–44)
MCH RBC QN AUTO: 32.8 PG (ref 26–34)
MCHC RBC AUTO-ENTMCNC: 32.9 G/DL (ref 31–37)
MCV RBC AUTO: 99.6 FL (ref 80–100)
MONOCYTES NFR BLD: 0.7 K/UL (ref 0.1–1.2)
MONOCYTES NFR BLD: 7 % (ref 2–11)
NEUTROPHILS NFR BLD: 79 % (ref 36–66)
NEUTS SEG NFR BLD: 8.9 K/UL (ref 1.8–7.7)
PLATELET # BLD AUTO: 333 K/UL (ref 140–450)
PMV BLD AUTO: 8 FL (ref 6–12)
POTASSIUM SERPL-SCNC: 4.1 MMOL/L (ref 3.7–5.3)
PROTHROMBIN TIME: 25.5 SEC (ref 9.4–12.6)
RBC # BLD AUTO: 3 M/UL (ref 4–5.2)
SODIUM SERPL-SCNC: 132 MMOL/L (ref 135–144)
WBC OTHER # BLD: 11.1 K/UL (ref 3.5–11)

## 2024-10-16 PROCEDURE — 2700000000 HC OXYGEN THERAPY PER DAY

## 2024-10-16 PROCEDURE — 6370000000 HC RX 637 (ALT 250 FOR IP)

## 2024-10-16 PROCEDURE — 94761 N-INVAS EAR/PLS OXIMETRY MLT: CPT

## 2024-10-16 PROCEDURE — 80048 BASIC METABOLIC PNL TOTAL CA: CPT

## 2024-10-16 PROCEDURE — 85610 PROTHROMBIN TIME: CPT

## 2024-10-16 PROCEDURE — 36415 COLL VENOUS BLD VENIPUNCTURE: CPT

## 2024-10-16 PROCEDURE — 99238 HOSP IP/OBS DSCHRG MGMT 30/<: CPT | Performed by: FAMILY MEDICINE

## 2024-10-16 PROCEDURE — 85025 COMPLETE CBC W/AUTO DIFF WBC: CPT

## 2024-10-16 PROCEDURE — 94660 CPAP INITIATION&MGMT: CPT

## 2024-10-16 RX ORDER — METOPROLOL SUCCINATE 50 MG/1
50 TABLET, EXTENDED RELEASE ORAL DAILY
Qty: 30 TABLET | Refills: 0 | Status: SHIPPED | OUTPATIENT
Start: 2024-10-17

## 2024-10-16 RX ORDER — LEVALBUTEROL INHALATION SOLUTION 0.63 MG/3ML
0.63 SOLUTION RESPIRATORY (INHALATION) EVERY 4 HOURS PRN
Qty: 120 EACH | Refills: 0 | Status: SHIPPED | OUTPATIENT
Start: 2024-10-16

## 2024-10-16 RX ORDER — LEVOFLOXACIN 750 MG/1
750 TABLET, FILM COATED ORAL
Qty: 1 TABLET | Refills: 0 | Status: SHIPPED | OUTPATIENT
Start: 2024-10-17 | End: 2024-10-19

## 2024-10-16 RX ADMIN — WARFARIN SODIUM 3.5 MG: 2.5 TABLET ORAL at 18:04

## 2024-10-16 RX ADMIN — VENLAFAXINE HYDROCHLORIDE 150 MG: 150 CAPSULE, EXTENDED RELEASE ORAL at 08:09

## 2024-10-16 RX ADMIN — PANTOPRAZOLE SODIUM 40 MG: 40 TABLET, DELAYED RELEASE ORAL at 06:14

## 2024-10-16 RX ADMIN — ONDANSETRON 4 MG: 4 TABLET, ORALLY DISINTEGRATING ORAL at 20:45

## 2024-10-16 RX ADMIN — ATORVASTATIN CALCIUM 40 MG: 40 TABLET, FILM COATED ORAL at 08:09

## 2024-10-16 RX ADMIN — HYDROCODONE BITARTRATE AND ACETAMINOPHEN 1 TABLET: 5; 325 TABLET ORAL at 16:53

## 2024-10-16 RX ADMIN — HYDROCODONE BITARTRATE AND ACETAMINOPHEN 1 TABLET: 5; 325 TABLET ORAL at 08:08

## 2024-10-16 RX ADMIN — METOPROLOL SUCCINATE 50 MG: 50 TABLET, EXTENDED RELEASE ORAL at 08:09

## 2024-10-16 RX ADMIN — ONDANSETRON 4 MG: 4 TABLET, ORALLY DISINTEGRATING ORAL at 12:38

## 2024-10-16 ASSESSMENT — PAIN DESCRIPTION - LOCATION
LOCATION: BACK;GENERALIZED
LOCATION: GENERALIZED;BACK

## 2024-10-16 ASSESSMENT — PAIN SCALES - GENERAL
PAINLEVEL_OUTOF10: 5
PAINLEVEL_OUTOF10: 5
PAINLEVEL_OUTOF10: 6
PAINLEVEL_OUTOF10: 3

## 2024-10-16 ASSESSMENT — PAIN DESCRIPTION - DESCRIPTORS
DESCRIPTORS: ACHING;DISCOMFORT
DESCRIPTORS: ACHING;DISCOMFORT;DULL

## 2024-10-16 NOTE — PROGRESS NOTES
Pharmacy Note  Warfarin Consult follow-up      Recent Labs     10/16/24  0626   INR 2.6     Recent Labs     10/14/24  0535 10/15/24  0602 10/16/24  0626   HGB 10.9* 10.0* 9.8*   HCT 32.9* 30.4* 29.9*    354 [id number]       Significant Drug-Drug Interactions:  levofloxacin      Notes:  INR therapeutic today at 2.6  Will give 3.5 mg today                       Daily PT/INR while inpatient.      Maryam Paulson, PharmD 10/16/2024 8:36 AM

## 2024-10-16 NOTE — CARE COORDINATION
CM spoke with patient at bedside, declines SNF,,continue with plan to return home spouse provides care 24/7,  Home with [place],  Requests transport by stretcher to home. Transport paperwork started.

## 2024-10-16 NOTE — PROGRESS NOTES
[facility]  Inpatient Service     Progress Note  10/16/2024    4:45 PM    Name:   Yari Stokes  MRN:     [id number]     Acct:      [id number]   Room:   [address] [place] [address] Day:  6  Admit Date:  10/10/2024  6:05 PM    PCP:   Tay Angel DO  Code Status:  Full Code    Subjective:     Overnight events: No significant events.     Pt was examined at bedside. She endorsed improved breathing and mentation. She denied chest pain, dyspnea, and acute bleeding.    Medications:     Allergies:    Allergies   Allergen Reactions    Diphenhydramine-Phenylephrine Rash    Sudafed [Pseudoephedrine]     Wellbutrin [Bupropion]        Current Meds:   Scheduled Meds:    warfarin  3.5 mg Oral Once    levoFLOXacin  750 mg Oral Q48H    pantoprazole  40 mg Oral QAM AC    lidocaine  1 patch TransDERmal Daily    metoprolol succinate  50 mg Oral Daily    sodium chloride flush  5-40 mL IntraVENous 2 times per day    atorvastatin  40 mg Oral Daily    torsemide  20 mg Oral Daily    venlafaxine  150 mg Oral Daily with breakfast    warfarin placeholder: dosing by pharmacy   Other RX Placeholder     Continuous Infusions:    sodium chloride       PRN Meds: morphine, HYDROcodone-acetaminophen **OR** HYDROcodone 5 mg - acetaminophen, levalbuterol, calcium carbonate, sodium chloride flush, sodium chloride, ondansetron **OR** ondansetron, polyethylene glycol, acetaminophen **OR** acetaminophen, benzonatate    ROS:   ROS is negative except for points noted above.    Data:     Vitals:  BP (!) 94/54   Pulse (!) 109   Temp 98.8 °F (37.1 °C) (Oral)   Resp 24   Ht 1.651 m (5' 5\")   Wt 96 kg (211 lb 10.3 oz)   LMP  (LMP Unknown)   SpO2 99%   BMI 35.22 kg/m²   Temp (24hrs), Av.4 °F (36.9 °C), Min:97.7 °F (36.5 °C), Max:98.9 °F (37.2 °C)    Recent Labs     10/13/24  2038   POCGLU 212*       I/O (24Hr):    Intake/Output Summary (Last 24 hours) at 10/16/2024 1645  Last data filed at  warfarin  INR goal: 2-3  Hgb 10.8->11.1 stable, will continue to monitor at this time   HR ranging from 60-110s from overnight to this AM; will continue to monitor, pt asymptomatic, patient endorsed pain is better controlled  Continue Toprol XL 50 mg daily  Called pt outpt anti-coag clinic to notify about pt's INR in hospital and being DC today she needs close follow up      Hyponatremia  Na 135->132  Monitor with Daily BMP     RICHAR on CKD Stage 3b  Cr 1.1->1.4->1.5,  eGFR 54->40->37, BUN 28->39->1.5  Could be due to decreased fluid intake vs diurectic use  Will monitor with daily BMP  Torsemide has been held  Consider repeat BMP to reasses     Moderate protein -calorie malnutrition  Continue Oral protein nutritional supplement     Patient had soft blood pressures this morning so torsemide has been held      Telemetry: Tachycardia in 120s-130s, SpO2 mostly ranging in the 80s, Some episodes of Afib  Anticoagulation: Warfarin  Dispo: Home with [facility]  Diet: ADULT ORAL NUTRITION SUPPLEMENT; Breakfast, Lunch, Dinner, PM Snack, AM Snack; Standard High Calorie/High Protein Oral Supplement  ADULT DIET; Regular; 1500 ml    Patient was seen, evaluated and discussed with Bob Latham MD Benita Beri Lei Ashu, MD  Family Medicine Resident, PGY-1   10/16/2024  4:45 PM      Senior Resident Attestation:    I have independently seen Yari Stokes and discussed the assessment and plan with the intern listed above and the attending Bob Latham MD. I have reviewed the note and have included any edits or additional information above.     Rajeev Browning MD  Family Medicine Resident, PGY-2   10/16/2024  4:45 PM

## 2024-10-16 NOTE — PROGRESS NOTES
Physical Therapy        Physical Therapy Cancel Note      DATE: 10/16/2024    NAME: Yari Stokes  MRN: [id number]   : 1955      Patient not seen this date for Physical Therapy due to:    Patient Declined: pt refused therapy due to c/o back pain and needing to be medicated first. Nursing notified.      Electronically signed by Celia Spencer, PT on 10/16/2024 at 3:39 PM

## 2024-10-16 NOTE — PROGRESS NOTES
Occupational Therapy    [facility]  Occupational Therapy Not Seen Note    DATE: 10/16/2024    NAME: Yari Stokes  MRN: [id number]   : 1955      Patient not seen this date for Occupational Therapy due to:    Patient Declined: Pt declined due to back pain. RN notified.     Next Scheduled Treatment: 10/17/24    Electronically signed by Martell Og OT on 10/16/2024 at 4:25 PM

## 2024-10-16 NOTE — PROGRESS NOTES
[facility] PULMONARY,CRITICAL CARE & SLEEP   Luis Armando Jeronimo MD/Yayo STEVENSP-BC, NP-C      Ashley LUONG NP-C     Jeff LUONG NP-C                                          Pulmonary Progress Note    Patient - Yari Stokes   Age - 69 y.o.   - 1955  MRN - [id number]  Acct # - [id number]  Date of Admission - 10/10/2024  6:05 PM    Consulting Service/Physician:       Primary Care Physician: Tay Angel DO    SUBJECTIVE:     Chief Complaint:   Chief Complaint   Patient presents with    Altered Mental Status     Subjective:    Yari is seen sitting up in bed on 1 L.  She is hopeful for discharge today.  She states she is feeling better.  Her torsemide was held this morning due to rising creatinine.  It was 1.5.  She has been afebrile.  She continues on Levaquin.  She is net -4.8 L.  She does have oxygen at home she uses nocturnally.  She tells me she is open to a repeat sleep study.    VITALS  /62   Pulse (!) 118   Temp 98.9 °F (37.2 °C) (Oral)   Resp 20   Ht 1.651 m (5' 5\")   Wt 96 kg (211 lb 10.3 oz)   LMP  (LMP Unknown)   SpO2 97%   BMI 35.22 kg/m²   Wt Readings from Last 3 Encounters:   10/11/24 96 kg (211 lb 10.3 oz)   23 106.3 kg (234 lb 5.6 oz)   23 98 kg (216 lb 0.8 oz)     I/O (24 Hours)    Intake/Output Summary (Last 24 hours) at 10/16/2024 1355  Last data filed at 10/16/2024 0625  Gross per 24 hour   Intake --   Output 600 ml   Net -600 ml     Ventilator:   Settings  FiO2 : 40 %  Insp Rise Time (%): 3 %  Exam:   Physical Exam   Constitutional: Elderly female sitting up in bed on 1 L no distress  HENT: Unremarkable  Head: Normocephalic and atraumatic.   Eyes: EOM are normal. Pupils are equal, round, and reactive to light.   Neck: Neck supple.   Cardiovascular:  Regular rate and rhythm.  Normal heart tones.  No JVD.    Pulmonary/Chest: Respirations even and unlabored, clear anteriorly, diminished in the  [facility] confirmed sleep apnea with Dr Juanita Bolanos, intolerant to treatment due to claustrophobia she tells me  Altered mental status-resolved secondary to hypoxia and hypercapnia  Nonmorbid obesity  Elevated left diaphragm-paralyzed??  Wheelchair-bound for many years due to spinal stenosis  Chronic heart failure with preserved ejection fraction following with cardiology at [facility]  Non-smoker  Chronic kidney disease  Recurrent PE/DVT dating back to 2018 at [facility]-on Coumadin therapy, has been therapeutic to supratherapeutic during entire hospital visit  COVID status unknown  Full CODE STATUS  PLAN:   Wean oxygen, keep pulse ox above 89%  Uses 2 L nocturnally  Would benefit from a repeat split-night sleep study with titration, patient states she is open to retrying BiPAP therapy at home.  Continue BiPAP at night and with naps while in the hospital  Encourage PT/OT  May follow-up outpatient in our office, [phone]  Discussed with  at bedside      Electronically signed by AG Wilcox CNP on 10/16/24     This progress note was completed using a voice transcription system. Every effort was made to ensure accuracy. However, inadvertent computerized transcription errors may be present.    Ashley Castellanos, NP-C, MSN  [facility]

## 2024-10-21 NOTE — PROGRESS NOTES
Physician Progress Note      PATIENT:               RAFFY ACUÑA  [facility] #:                  [id number]  :                       1955  ADMIT DATE:       10/10/2024 6:05 PM  DISCH DATE:        10/16/2024 9:15 PM  RESPONDING  PROVIDER #:        Bob Latham MD          QUERY TEXT:    Pt admitted with lobar pneumonia.  Pt noted to have been treated with abx   Zithromax/ Rocephin changed to IV Cefepime x 5 days. If possible, please   document in the progress notes and discharge summary if you are evaluating   and/or treating any of the following:    Note: CAP and HCAP indicate where the pneumonia was acquired, not a specific   type.    The medical record reflects the following:  Risk Factors: CHF CKD COPD AMS  Clinical Indicators: WBC 11.2-11.7 lactic acid 0.8, acute respiratory failure   with hypoxia and hypercapnia, Blood culture no growth in 4 days, VBG 10/14:   Elevated PCO2, HCO3, and BASE Excess, Wean O2 as tolerated, currently on 2 L  XR CHEST 10/10/2024-Right lung airspace opacities compatible with pneumonia.  10/16 D/C summary -Patient was started on Rocephin and Azithromycin for   pneumonia and UTI. Urine culture was positive for pseudomonas so Rocephin was   substituted out with Cefepime.  10/15 pulmonary note-chest x-ray on  shows improvement but still patchy   bilateral lung and not traits chronically elevated left Hema diaphragm BiPAP   at night, Bronchiectasis noted on CT imaging  10/11 pulmonary consult- Multifocal pneumonia bilaterally, Switch to cefepime   since she just had a Pseudomonas UTI and now has a pneumonia, scheduling   outpatient PFTs and sleep study for CECILIA continue albuterol aerosols  Treatment: Dc w/ Levaquin PO for a total of 7 days of abx treatment, pulmonary   consult, CXR, CT chest, scheduled for outpatient sleep study for CECILIA  Options provided:  -- Gram negative pneumonia  -- Other - I will add my own diagnosis  -- Disagree - Not applicable / Not valid  -- Disagree -

## 2025-02-18 ENCOUNTER — HOSPITAL ENCOUNTER (OUTPATIENT)
Age: 70
Setting detail: SPECIMEN
Discharge: HOME | End: 2025-02-18
Payer: MEDICARE

## 2025-02-18 LAB
BASOPHILS # BLD: 0.03 K/UL (ref 0–0.2)
BASOPHILS NFR BLD: 0 % (ref 0–2)
EOSINOPHIL # BLD: 0.14 K/UL (ref 0–0.44)
EOSINOPHILS RELATIVE PERCENT: 2 % (ref 1–4)
ERYTHROCYTE [DISTWIDTH] IN BLOOD BY AUTOMATED COUNT: 12.3 % (ref 11.8–14.4)
HCT VFR BLD AUTO: 35.2 % (ref 36.3–47.1)
HGB BLD-MCNC: 11 G/DL (ref 11.9–15.1)
IMM GRANULOCYTES # BLD AUTO: 0.03 K/UL (ref 0–0.3)
IMM GRANULOCYTES NFR BLD: 0 %
LYMPHOCYTES NFR BLD: 1.3 K/UL (ref 1.1–3.7)
LYMPHOCYTES RELATIVE PERCENT: 17 % (ref 24–43)
MCH RBC QN AUTO: 32.4 PG (ref 25.2–33.5)
MCHC RBC AUTO-ENTMCNC: 31.3 G/DL (ref 28.4–34.8)
MCV RBC AUTO: 103.8 FL (ref 82.6–102.9)
MONOCYTES NFR BLD: 0.42 K/UL (ref 0.1–1.2)
MONOCYTES NFR BLD: 6 % (ref 3–12)
NEUTROPHILS NFR BLD: 75 % (ref 36–65)
NEUTS SEG NFR BLD: 5.58 K/UL (ref 1.5–8.1)
NRBC BLD-RTO: 0 PER 100 WBC
PLATELET # BLD AUTO: 193 K/UL (ref 138–453)
PMV BLD AUTO: 10.9 FL (ref 8.1–13.5)
RBC # BLD AUTO: 3.39 M/UL (ref 3.95–5.11)
RBC # BLD: ABNORMAL 10*6/UL
WBC OTHER # BLD: 7.5 K/UL (ref 3.5–11.3)

## 2025-02-18 PROCEDURE — 85025 COMPLETE CBC W/AUTO DIFF WBC: CPT

## 2025-02-18 PROCEDURE — 83036 HEMOGLOBIN GLYCOSYLATED A1C: CPT

## 2025-02-18 PROCEDURE — 80048 BASIC METABOLIC PNL TOTAL CA: CPT

## 2025-02-19 LAB
ANION GAP SERPL CALCULATED.3IONS-SCNC: 10 MMOL/L (ref 9–16)
BUN SERPL-MCNC: 35 MG/DL (ref 8–23)
CALCIUM SERPL-MCNC: 9.7 MG/DL (ref 8.6–10.4)
CHLORIDE SERPL-SCNC: 90 MMOL/L (ref 98–107)
CO2 SERPL-SCNC: 34 MMOL/L (ref 20–31)
CREAT SERPL-MCNC: 1.3 MG/DL (ref 0.6–0.9)
EST. AVERAGE GLUCOSE BLD GHB EST-MCNC: 117 MG/DL
GFR, ESTIMATED: 44 ML/MIN/1.73M2
GLUCOSE SERPL-MCNC: 94 MG/DL (ref 74–99)
HBA1C MFR BLD: 5.7 % (ref 4–6)
POTASSIUM SERPL-SCNC: 4.2 MMOL/L (ref 3.7–5.3)
SODIUM SERPL-SCNC: 134 MMOL/L (ref 136–145)